# Patient Record
Sex: FEMALE | Race: WHITE | NOT HISPANIC OR LATINO
[De-identification: names, ages, dates, MRNs, and addresses within clinical notes are randomized per-mention and may not be internally consistent; named-entity substitution may affect disease eponyms.]

---

## 2017-02-05 ENCOUNTER — RESULT REVIEW (OUTPATIENT)
Age: 57
End: 2017-02-05

## 2017-02-06 ENCOUNTER — APPOINTMENT (OUTPATIENT)
Dept: MAMMOGRAPHY | Facility: IMAGING CENTER | Age: 57
End: 2017-02-06

## 2017-02-06 ENCOUNTER — OUTPATIENT (OUTPATIENT)
Dept: OUTPATIENT SERVICES | Facility: HOSPITAL | Age: 57
LOS: 1 days | End: 2017-02-06
Payer: COMMERCIAL

## 2017-02-06 DIAGNOSIS — Z98.89 OTHER SPECIFIED POSTPROCEDURAL STATES: Chronic | ICD-10-CM

## 2017-02-06 DIAGNOSIS — Z41.1 ENCOUNTER FOR COSMETIC SURGERY: Chronic | ICD-10-CM

## 2017-02-06 DIAGNOSIS — Z00.8 ENCOUNTER FOR OTHER GENERAL EXAMINATION: ICD-10-CM

## 2017-02-06 DIAGNOSIS — Z00.00 ENCOUNTER FOR GENERAL ADULT MEDICAL EXAMINATION WITHOUT ABNORMAL FINDINGS: ICD-10-CM

## 2017-02-09 ENCOUNTER — APPOINTMENT (OUTPATIENT)
Dept: SURGICAL ONCOLOGY | Facility: CLINIC | Age: 57
End: 2017-02-09

## 2017-02-09 VITALS
DIASTOLIC BLOOD PRESSURE: 81 MMHG | OXYGEN SATURATION: 100 % | TEMPERATURE: 97.7 F | SYSTOLIC BLOOD PRESSURE: 122 MMHG | HEART RATE: 69 BPM | HEIGHT: 65 IN | BODY MASS INDEX: 21.16 KG/M2 | WEIGHT: 127 LBS

## 2017-02-09 DIAGNOSIS — Z87.891 PERSONAL HISTORY OF NICOTINE DEPENDENCE: ICD-10-CM

## 2017-02-09 DIAGNOSIS — R63.4 ABNORMAL WEIGHT LOSS: ICD-10-CM

## 2017-02-13 ENCOUNTER — FORM ENCOUNTER (OUTPATIENT)
Age: 57
End: 2017-02-13

## 2017-02-13 ENCOUNTER — APPOINTMENT (OUTPATIENT)
Dept: OBGYN | Facility: CLINIC | Age: 57
End: 2017-02-13

## 2017-02-14 ENCOUNTER — APPOINTMENT (OUTPATIENT)
Dept: MRI IMAGING | Facility: CLINIC | Age: 57
End: 2017-02-14

## 2017-02-14 ENCOUNTER — OUTPATIENT (OUTPATIENT)
Dept: OUTPATIENT SERVICES | Facility: HOSPITAL | Age: 57
LOS: 1 days | End: 2017-02-14
Payer: COMMERCIAL

## 2017-02-14 DIAGNOSIS — D05.12 INTRADUCTAL CARCINOMA IN SITU OF LEFT BREAST: ICD-10-CM

## 2017-02-14 DIAGNOSIS — Z41.1 ENCOUNTER FOR COSMETIC SURGERY: Chronic | ICD-10-CM

## 2017-02-14 DIAGNOSIS — Z98.89 OTHER SPECIFIED POSTPROCEDURAL STATES: Chronic | ICD-10-CM

## 2017-02-21 ENCOUNTER — FORM ENCOUNTER (OUTPATIENT)
Age: 57
End: 2017-02-21

## 2017-02-22 ENCOUNTER — APPOINTMENT (OUTPATIENT)
Dept: ULTRASOUND IMAGING | Facility: IMAGING CENTER | Age: 57
End: 2017-02-22

## 2017-02-22 ENCOUNTER — OUTPATIENT (OUTPATIENT)
Dept: OUTPATIENT SERVICES | Facility: HOSPITAL | Age: 57
LOS: 1 days | End: 2017-02-22
Payer: COMMERCIAL

## 2017-02-22 DIAGNOSIS — Z00.8 ENCOUNTER FOR OTHER GENERAL EXAMINATION: ICD-10-CM

## 2017-02-22 DIAGNOSIS — Z41.1 ENCOUNTER FOR COSMETIC SURGERY: Chronic | ICD-10-CM

## 2017-02-22 DIAGNOSIS — Z98.89 OTHER SPECIFIED POSTPROCEDURAL STATES: Chronic | ICD-10-CM

## 2017-02-22 PROCEDURE — 76642 ULTRASOUND BREAST LIMITED: CPT

## 2017-03-22 ENCOUNTER — OUTPATIENT (OUTPATIENT)
Dept: OUTPATIENT SERVICES | Facility: HOSPITAL | Age: 57
LOS: 1 days | End: 2017-03-22
Payer: COMMERCIAL

## 2017-03-22 VITALS
TEMPERATURE: 99 F | RESPIRATION RATE: 16 BRPM | HEIGHT: 64.25 IN | WEIGHT: 132.06 LBS | SYSTOLIC BLOOD PRESSURE: 122 MMHG | DIASTOLIC BLOOD PRESSURE: 70 MMHG | HEART RATE: 76 BPM

## 2017-03-22 DIAGNOSIS — D05.12 INTRADUCTAL CARCINOMA IN SITU OF LEFT BREAST: ICD-10-CM

## 2017-03-22 DIAGNOSIS — Z98.89 OTHER SPECIFIED POSTPROCEDURAL STATES: Chronic | ICD-10-CM

## 2017-03-22 DIAGNOSIS — Z41.1 ENCOUNTER FOR COSMETIC SURGERY: Chronic | ICD-10-CM

## 2017-03-22 DIAGNOSIS — E89.2 POSTPROCEDURAL HYPOPARATHYROIDISM: Chronic | ICD-10-CM

## 2017-03-22 DIAGNOSIS — K21.9 GASTRO-ESOPHAGEAL REFLUX DISEASE WITHOUT ESOPHAGITIS: ICD-10-CM

## 2017-03-22 LAB
ALBUMIN SERPL ELPH-MCNC: 4.5 G/DL — SIGNIFICANT CHANGE UP (ref 3.3–5)
ALP SERPL-CCNC: 72 U/L — SIGNIFICANT CHANGE UP (ref 40–120)
ALT FLD-CCNC: 22 U/L — SIGNIFICANT CHANGE UP (ref 4–33)
AST SERPL-CCNC: 23 U/L — SIGNIFICANT CHANGE UP (ref 4–32)
BILIRUB SERPL-MCNC: 0.7 MG/DL — SIGNIFICANT CHANGE UP (ref 0.2–1.2)
BLD GP AB SCN SERPL QL: NEGATIVE — SIGNIFICANT CHANGE UP
BUN SERPL-MCNC: 31 MG/DL — HIGH (ref 7–23)
CALCIUM SERPL-MCNC: 9.6 MG/DL — SIGNIFICANT CHANGE UP (ref 8.4–10.5)
CHLORIDE SERPL-SCNC: 100 MMOL/L — SIGNIFICANT CHANGE UP (ref 98–107)
CO2 SERPL-SCNC: 27 MMOL/L — SIGNIFICANT CHANGE UP (ref 22–31)
CREAT SERPL-MCNC: 1.1 MG/DL — SIGNIFICANT CHANGE UP (ref 0.5–1.3)
GLUCOSE SERPL-MCNC: 64 MG/DL — LOW (ref 70–99)
HCT VFR BLD CALC: 42.7 % — SIGNIFICANT CHANGE UP (ref 34.5–45)
HGB BLD-MCNC: 14.4 G/DL — SIGNIFICANT CHANGE UP (ref 11.5–15.5)
MCHC RBC-ENTMCNC: 30.1 PG — SIGNIFICANT CHANGE UP (ref 27–34)
MCHC RBC-ENTMCNC: 33.7 % — SIGNIFICANT CHANGE UP (ref 32–36)
MCV RBC AUTO: 89.1 FL — SIGNIFICANT CHANGE UP (ref 80–100)
PLATELET # BLD AUTO: 258 K/UL — SIGNIFICANT CHANGE UP (ref 150–400)
PMV BLD: 10.5 FL — SIGNIFICANT CHANGE UP (ref 7–13)
POTASSIUM SERPL-MCNC: 3.4 MMOL/L — LOW (ref 3.5–5.3)
POTASSIUM SERPL-SCNC: 3.4 MMOL/L — LOW (ref 3.5–5.3)
PROT SERPL-MCNC: 7 G/DL — SIGNIFICANT CHANGE UP (ref 6–8.3)
RBC # BLD: 4.79 M/UL — SIGNIFICANT CHANGE UP (ref 3.8–5.2)
RBC # FLD: 12.8 % — SIGNIFICANT CHANGE UP (ref 10.3–14.5)
RH IG SCN BLD-IMP: POSITIVE — SIGNIFICANT CHANGE UP
SODIUM SERPL-SCNC: 142 MMOL/L — SIGNIFICANT CHANGE UP (ref 135–145)
WBC # BLD: 7.45 K/UL — SIGNIFICANT CHANGE UP (ref 3.8–10.5)
WBC # FLD AUTO: 7.45 K/UL — SIGNIFICANT CHANGE UP (ref 3.8–10.5)

## 2017-03-22 PROCEDURE — 93010 ELECTROCARDIOGRAM REPORT: CPT

## 2017-03-22 NOTE — H&P PST ADULT - GASTROINTESTINAL DETAILS
no organomegaly/no masses palpable/soft/nontender/no rebound tenderness/no guarding/bowel sounds normal/no distention

## 2017-03-22 NOTE — H&P PST ADULT - NEGATIVE OPHTHALMOLOGIC SYMPTOMS
no photophobia/no lacrimation L/no blurred vision R/no lacrimation R/no diplopia/no blurred vision L

## 2017-03-22 NOTE — H&P PST ADULT - NEGATIVE NEUROLOGICAL SYMPTOMS
no weakness/no paresthesias/no vertigo/no syncope/no tremors/no transient paralysis/no headache/no generalized seizures/no focal seizures/no difficulty walking

## 2017-03-22 NOTE — H&P PST ADULT - NEGATIVE MUSCULOSKELETAL SYMPTOMS
no arthralgia/no joint swelling/no stiffness/no myalgia/no muscle cramps/no arthritis/no muscle weakness

## 2017-03-22 NOTE — H&P PST ADULT - NEGATIVE ENMT SYMPTOMS
no sinus symptoms/no ear pain/no vertigo/no nose bleeds/no hearing difficulty/no post-nasal discharge/no nasal discharge/no nasal congestion/no tinnitus

## 2017-03-22 NOTE — H&P PST ADULT - RS GEN PE MLT RESP DETAILS PC
airway patent/no rhonchi/good air movement/no rales/breath sounds equal/respirations non-labored/clear to auscultation bilaterally

## 2017-03-22 NOTE — H&P PST ADULT - NEGATIVE CARDIOVASCULAR SYMPTOMS
no peripheral edema/no claudication/no palpitations/no orthopnea/no paroxysmal nocturnal dyspnea/no dyspnea on exertion/no chest pain

## 2017-03-22 NOTE — H&P PST ADULT - HISTORY OF PRESENT ILLNESS
55 y/o healthy female presents for preop evaluation for Bilateral Total Mastectomies, bilateral Axillary [place] Lymph Node Biopsies Possible Bilateral Axillary Node Dissection, Bilateral Tissue Expanders 55 y/o healthy female presents for preop evaluation for Bilateral Total Mastectomies, bilateral Axillary [place] Lymph Node Biopsies Possible Bilateral Axillary Node Dissection, Bilateral Tissue Expanders, Bilateral Axillary Closure, B/L Alloderm, Bilateral Muscle Flaps on 03/30/17. Pt states he went for routine mammogram & left breast mass seen. Pt had sonogram & MRI of breasts with biopsy & surgery recommended..

## 2017-03-22 NOTE — H&P PST ADULT - NEGATIVE GASTROINTESTINAL SYMPTOMS
no change in bowel habits/no melena/no constipation/no hematochezia/no vomiting/no abdominal pain/no diarrhea/no nausea

## 2017-03-22 NOTE — H&P PST ADULT - NEGATIVE FEMALE-SPECIFIC SYMPTOMS
no vaginal discharge/no dysmenorrhea/no irregular menses/no menorrhagia/no spotting/no pelvic pain/no amenorrhea/no abnormal vaginal bleeding

## 2017-03-22 NOTE — H&P PST ADULT - FAMILY HISTORY
Mother  Still living? No  Family history of breast cancer in mother, Age at diagnosis: Age Unknown     Father  Still living? No  Family history of lung cancer, Age at diagnosis: Age Unknown     Sibling  Still living? Yes, Estimated age: Age Unknown  Family history of malignant melanoma of skin, Age at diagnosis: Age Unknown

## 2017-03-22 NOTE — H&P PST ADULT - NSANTHOSAYNRD_GEN_A_CORE
No. EDMUND screening performed.  STOP BANG Legend: 0-2 = LOW Risk; 3-4 = INTERMEDIATE Risk; 5-8 = HIGH Risk

## 2017-03-22 NOTE — H&P PST ADULT - PROBLEM SELECTOR PLAN 1
Scheduled for  Bilateral Total Mastectomies, bilateral Axillary [place] Lymph Node Biopsies Possible Bilateral Axillary Node Dissection, Bilateral Tissue Expanders, Bilateral Axillary Closure, B/L Alloderm, Bilateral Muscle Flaps on 03/30/17

## 2017-03-22 NOTE — H&P PST ADULT - PSH
H/O breast biopsy  pt. can't recall which breast-2005- negative  Biopsy   H/O rhinoplasty  1976  History of parathyroidectomy    Liveborn by   c x 1 - Triplets H/O breast biopsy  pt. can't recall which breast-2005- negative  Biopsy   H/O rhinoplasty  1976  History of parathyroidectomy  10/2014  Liveborn by   c x 1 - Triplets

## 2017-03-29 ENCOUNTER — RESULT REVIEW (OUTPATIENT)
Age: 57
End: 2017-03-29

## 2017-03-29 NOTE — ASU PATIENT PROFILE, ADULT - PSH
H/O breast biopsy  pt. can't recall which breast-2005- negative  Biopsy   H/O rhinoplasty  1976  History of parathyroidectomy  10/2014  Liveborn by   c x 1 - Triplets

## 2017-03-30 ENCOUNTER — INPATIENT (INPATIENT)
Facility: HOSPITAL | Age: 57
LOS: 1 days | Discharge: HOME | End: 2017-04-01
Attending: SURGERY | Admitting: SURGERY
Payer: COMMERCIAL

## 2017-03-30 ENCOUNTER — APPOINTMENT (OUTPATIENT)
Dept: NUCLEAR MEDICINE | Facility: HOSPITAL | Age: 57
End: 2017-03-30

## 2017-03-30 ENCOUNTER — APPOINTMENT (OUTPATIENT)
Dept: SURGICAL ONCOLOGY | Facility: HOSPITAL | Age: 57
End: 2017-03-30

## 2017-03-30 VITALS
TEMPERATURE: 98 F | DIASTOLIC BLOOD PRESSURE: 69 MMHG | OXYGEN SATURATION: 100 % | WEIGHT: 132.06 LBS | SYSTOLIC BLOOD PRESSURE: 107 MMHG | RESPIRATION RATE: 16 BRPM | HEIGHT: 64.25 IN | HEART RATE: 80 BPM

## 2017-03-30 DIAGNOSIS — Z98.89 OTHER SPECIFIED POSTPROCEDURAL STATES: Chronic | ICD-10-CM

## 2017-03-30 DIAGNOSIS — Z41.1 ENCOUNTER FOR COSMETIC SURGERY: Chronic | ICD-10-CM

## 2017-03-30 DIAGNOSIS — E89.2 POSTPROCEDURAL HYPOPARATHYROIDISM: Chronic | ICD-10-CM

## 2017-03-30 DIAGNOSIS — D05.12 INTRADUCTAL CARCINOMA IN SITU OF LEFT BREAST: ICD-10-CM

## 2017-03-30 LAB — RH IG SCN BLD-IMP: POSITIVE — SIGNIFICANT CHANGE UP

## 2017-03-30 PROCEDURE — 38792 RA TRACER ID OF SENTINL NODE: CPT | Mod: 50

## 2017-03-30 PROCEDURE — 38740 REMOVE ARMPIT LYMPH NODES: CPT | Mod: 50,59

## 2017-03-30 PROCEDURE — 88307 TISSUE EXAM BY PATHOLOGIST: CPT | Mod: 26

## 2017-03-30 PROCEDURE — 12034 INTMD RPR S/TR/EXT 7.6-12.5: CPT | Mod: 59

## 2017-03-30 PROCEDURE — 19303 MAST SIMPLE COMPLETE: CPT | Mod: 50

## 2017-03-30 PROCEDURE — 88305 TISSUE EXAM BY PATHOLOGIST: CPT | Mod: 26

## 2017-03-30 PROCEDURE — 15734 MUSCLE-SKIN GRAFT TRUNK: CPT | Mod: 59

## 2017-03-30 PROCEDURE — 88331 PATH CONSLTJ SURG 1 BLK 1SPC: CPT | Mod: 26

## 2017-03-30 PROCEDURE — 19357 TISS XPNDR PLMT BRST RCNSTJ: CPT | Mod: 50

## 2017-03-30 RX ORDER — ACETAMINOPHEN 500 MG
650 TABLET ORAL EVERY 6 HOURS
Qty: 0 | Refills: 0 | Status: DISCONTINUED | OUTPATIENT
Start: 2017-03-30 | End: 2017-04-01

## 2017-03-30 RX ORDER — CEFAZOLIN SODIUM 1 G
1000 VIAL (EA) INJECTION EVERY 8 HOURS
Qty: 0 | Refills: 0 | Status: DISCONTINUED | OUTPATIENT
Start: 2017-03-30 | End: 2017-04-01

## 2017-03-30 RX ORDER — METOCLOPRAMIDE HCL 10 MG
10 TABLET ORAL EVERY 8 HOURS
Qty: 0 | Refills: 0 | Status: DISCONTINUED | OUTPATIENT
Start: 2017-03-30 | End: 2017-04-01

## 2017-03-30 RX ORDER — HYDROMORPHONE HYDROCHLORIDE 2 MG/ML
0.5 INJECTION INTRAMUSCULAR; INTRAVENOUS; SUBCUTANEOUS
Qty: 0 | Refills: 0 | Status: DISCONTINUED | OUTPATIENT
Start: 2017-03-30 | End: 2017-03-30

## 2017-03-30 RX ORDER — SODIUM CHLORIDE 9 MG/ML
1000 INJECTION, SOLUTION INTRAVENOUS
Qty: 0 | Refills: 0 | Status: DISCONTINUED | OUTPATIENT
Start: 2017-03-30 | End: 2017-03-31

## 2017-03-30 RX ORDER — DIAZEPAM 5 MG
5 TABLET ORAL EVERY 6 HOURS
Qty: 0 | Refills: 0 | Status: DISCONTINUED | OUTPATIENT
Start: 2017-03-30 | End: 2017-04-01

## 2017-03-30 RX ORDER — SODIUM CHLORIDE 9 MG/ML
1000 INJECTION, SOLUTION INTRAVENOUS
Qty: 0 | Refills: 0 | Status: DISCONTINUED | OUTPATIENT
Start: 2017-03-30 | End: 2017-03-30

## 2017-03-30 RX ORDER — ONDANSETRON 8 MG/1
4 TABLET, FILM COATED ORAL EVERY 6 HOURS
Qty: 0 | Refills: 0 | Status: DISCONTINUED | OUTPATIENT
Start: 2017-03-30 | End: 2017-04-01

## 2017-03-30 RX ADMIN — SODIUM CHLORIDE 75 MILLILITER(S): 9 INJECTION, SOLUTION INTRAVENOUS at 22:58

## 2017-03-30 RX ADMIN — ONDANSETRON 4 MILLIGRAM(S): 8 TABLET, FILM COATED ORAL at 23:26

## 2017-03-30 NOTE — BRIEF OPERATIVE NOTE - SPECIMENS
right breast, right retroareolar tissue, right sentinel lymph node, left breast, left retroareolar tissue, left sentinel lymph node right breast, right subareolar ducts, right sentinel lymph node, left breast, left subareolar ducts, left sentinel lymph node

## 2017-03-30 NOTE — BRIEF OPERATIVE NOTE - OPERATION/FINDINGS
bilateral nipple sparing mastectomies, bilateral sentinel lymph node biopsies bilateral nipple sparing mastectomies, bilateral sentinel lymph node biopsies (frozen section negative)

## 2017-03-30 NOTE — BRIEF OPERATIVE NOTE - OPERATION/FINDINGS
Brief operative note for plastic surgery portion; please see separate surgical oncology note.     Bilateral immediate breast reconstruction with insertion of 350cc tissue expanders (250cc fill bilaterally) in the subpectoral plane with ADM slings was performed following bilateral nipple and areola sparing mastectomies and bilateral sentinel lymph node biopsies

## 2017-03-31 ENCOUNTER — TRANSCRIPTION ENCOUNTER (OUTPATIENT)
Age: 57
End: 2017-03-31

## 2017-03-31 LAB
BUN SERPL-MCNC: 18 MG/DL — SIGNIFICANT CHANGE UP (ref 7–23)
CALCIUM SERPL-MCNC: 8.9 MG/DL — SIGNIFICANT CHANGE UP (ref 8.4–10.5)
CHLORIDE SERPL-SCNC: 102 MMOL/L — SIGNIFICANT CHANGE UP (ref 98–107)
CO2 SERPL-SCNC: 27 MMOL/L — SIGNIFICANT CHANGE UP (ref 22–31)
CREAT SERPL-MCNC: 0.84 MG/DL — SIGNIFICANT CHANGE UP (ref 0.5–1.3)
GLUCOSE SERPL-MCNC: 116 MG/DL — HIGH (ref 70–99)
HCT VFR BLD CALC: 38.8 % — SIGNIFICANT CHANGE UP (ref 34.5–45)
HGB BLD-MCNC: 13.2 G/DL — SIGNIFICANT CHANGE UP (ref 11.5–15.5)
MAGNESIUM SERPL-MCNC: 2.1 MG/DL — SIGNIFICANT CHANGE UP (ref 1.6–2.6)
MCHC RBC-ENTMCNC: 30.5 PG — SIGNIFICANT CHANGE UP (ref 27–34)
MCHC RBC-ENTMCNC: 34 % — SIGNIFICANT CHANGE UP (ref 32–36)
MCV RBC AUTO: 89.6 FL — SIGNIFICANT CHANGE UP (ref 80–100)
PHOSPHATE SERPL-MCNC: 3.4 MG/DL — SIGNIFICANT CHANGE UP (ref 2.5–4.5)
PLATELET # BLD AUTO: 209 K/UL — SIGNIFICANT CHANGE UP (ref 150–400)
PMV BLD: 10.4 FL — SIGNIFICANT CHANGE UP (ref 7–13)
POTASSIUM SERPL-MCNC: 3.9 MMOL/L — SIGNIFICANT CHANGE UP (ref 3.5–5.3)
POTASSIUM SERPL-SCNC: 3.9 MMOL/L — SIGNIFICANT CHANGE UP (ref 3.5–5.3)
RBC # BLD: 4.33 M/UL — SIGNIFICANT CHANGE UP (ref 3.8–5.2)
RBC # FLD: 12.9 % — SIGNIFICANT CHANGE UP (ref 10.3–14.5)
SODIUM SERPL-SCNC: 143 MMOL/L — SIGNIFICANT CHANGE UP (ref 135–145)
WBC # BLD: 12.55 K/UL — HIGH (ref 3.8–10.5)
WBC # FLD AUTO: 12.55 K/UL — HIGH (ref 3.8–10.5)

## 2017-03-31 RX ORDER — ACETAMINOPHEN 500 MG
2 TABLET ORAL
Qty: 0 | Refills: 0 | COMMUNITY
Start: 2017-03-31

## 2017-03-31 RX ORDER — ACETAMINOPHEN 500 MG
1000 TABLET ORAL ONCE
Qty: 0 | Refills: 0 | Status: COMPLETED | OUTPATIENT
Start: 2017-03-31 | End: 2017-04-01

## 2017-03-31 RX ORDER — ACETAMINOPHEN 500 MG
1000 TABLET ORAL ONCE
Qty: 0 | Refills: 0 | Status: COMPLETED | OUTPATIENT
Start: 2017-03-31 | End: 2017-03-31

## 2017-03-31 RX ORDER — OXYCODONE HYDROCHLORIDE 5 MG/1
5 TABLET ORAL EVERY 6 HOURS
Qty: 0 | Refills: 0 | Status: DISCONTINUED | OUTPATIENT
Start: 2017-03-31 | End: 2017-04-01

## 2017-03-31 RX ORDER — PANTOPRAZOLE SODIUM 20 MG/1
40 TABLET, DELAYED RELEASE ORAL
Qty: 0 | Refills: 0 | Status: DISCONTINUED | OUTPATIENT
Start: 2017-03-31 | End: 2017-04-01

## 2017-03-31 RX ADMIN — Medication 100 MILLIGRAM(S): at 21:58

## 2017-03-31 RX ADMIN — Medication 100 MILLIGRAM(S): at 03:06

## 2017-03-31 RX ADMIN — PANTOPRAZOLE SODIUM 40 MILLIGRAM(S): 20 TABLET, DELAYED RELEASE ORAL at 11:16

## 2017-03-31 RX ADMIN — Medication 5 MILLIGRAM(S): at 11:16

## 2017-03-31 RX ADMIN — Medication 400 MILLIGRAM(S): at 18:01

## 2017-03-31 RX ADMIN — Medication 10 MILLIGRAM(S): at 01:47

## 2017-03-31 RX ADMIN — Medication 5 MILLIGRAM(S): at 02:40

## 2017-03-31 RX ADMIN — Medication 1000 MILLIGRAM(S): at 18:20

## 2017-03-31 RX ADMIN — Medication 100 MILLIGRAM(S): at 11:17

## 2017-03-31 NOTE — DISCHARGE NOTE ADULT - CARE PROVIDERS DIRECT ADDRESSES
[email or web address]

## 2017-03-31 NOTE — DISCHARGE NOTE ADULT - MEDICATION SUMMARY - MEDICATIONS TO STOP TAKING
I will STOP taking the medications listed below when I get home from the hospital:    Aleve 220 mg oral tablet  -- 2 tab(s) by mouth every 8 hours    Aleve PM 25 mg-220 mg oral tablet  -- 0.25 tab(s) by mouth 1x/week at bedtime

## 2017-03-31 NOTE — DISCHARGE NOTE ADULT - CONDITIONS AT DISCHARGE
Pt vital signs stable, tolerated diet well, voids without difficulty, IV d/c before discharge. BEN teaching given, pt verbalized understanding, return demonstration given, supplies provided.

## 2017-03-31 NOTE — DISCHARGE NOTE ADULT - HOSPITAL COURSE
57 y/o healthy female presents for preop evaluation for Bilateral Total Mastectomies, bilateral Axillary [place] Lymph Node Biopsies Possible Bilateral Axillary Node Dissection, Bilateral Tissue Expanders, Bilateral Axillary Closure, B/L Alloderm, Bilateral Muscle Flaps on 03/30/17. Pt states he went for routine mammogram & left breast mass seen. Pt had sonogram & MRI of breasts with biopsy & surgery recommended.. She presented to [facility] on 3/30/17 for a scheduled procedure. She went to the OR and had a bilateral nipple sparing mastectomies, bilateral sentinel lymph node biopsies (frozen section negative) with Dr. Talley and bilateral immediate breast reconstruction with insertion of 350cc tissue expanders (250cc fill bilaterally) with Dr. Cline. Post op she went to the surgical floor. She remained in the hospital POD 1 due to increased pain. During hospital course patients diet was slowly advanced as tolerated.  At this time, pt is tolerating a regular diet, ambulating and voiding.  Pt is stable for discharge as per the attending at this time. Patient is a 55 y/o healthy woman with DCIS of the left breast who presented to [facility] for resection.  On 3/30/17, she underwent bilateral nipple sparing mastectomies with bilateral sentinel lymph node biopsies (intraoperative frozen sections negative) with Dr. Talley and bilateral immediate breast reconstruction with insertion of 350cc tissue expanders (250cc fill bilaterally) with Dr. Cline. She tolerated the procedure well and was transferred to the surgical floor in stable condition. Post-operative course was complicated by pain exacerbated by intolerance to opioid pain medications, and she remained in the hospital POD 1 for additional management.  On POD 2, her pain was better controlled and she was determined to be stable for discharge to home with VNS. At the time of discharge, she was tolerating a regular diet, able to ambulate independently, and her pain was adequately controlled with oral pain medications.  She had no further issues at the time of discharge and was in agreement with the discharge plan.

## 2017-03-31 NOTE — DISCHARGE NOTE ADULT - CARE PROVIDER_API CALL
Nilay Talley), Surgery  [address] [place] [address]  Phone: [phone]  Fax: [phone]    Dom Cline), Plastic Surgery  [address] [place] [address]  Phone: [phone]  Fax: [phone]

## 2017-03-31 NOTE — DISCHARGE NOTE ADULT - MEDICATION SUMMARY - MEDICATIONS TO TAKE
I will START or STAY ON the medications listed below when I get home from the hospital:    oxyCODONE 5 mg oral tablet  -- 1 tab(s) by mouth every 6 hours, As needed, for Severe Pain MDD:4 tabs  -- Indication: For Severe Pain    acetaminophen 325 mg oral tablet  -- 2 tab(s) by mouth every 6 hours for pain  -- Indication: For Pain    diazePAM 5 mg oral tablet  -- 1 tab(s) by mouth every 6 hours, As needed, muscle spasm MDD:4 tabs  -- Indication: For Muscle Spasm    Dexilant 30 mg oral delayed release capsule  -- 1 cap(s) by mouth 2x/week  -- Indication: For GERD

## 2017-03-31 NOTE — DISCHARGE NOTE ADULT - HOME CARE AGENCY
[facility] [phone]. Initial visit will be day after discharge home. A nurse will call prior to home visit

## 2017-03-31 NOTE — DISCHARGE NOTE ADULT - NSTOBACCOWEBSITE_GEN_A_NCS
[place] Website --- [email or web address] website --- [email or web address] [place] [email or web address] Website --- [email or web address]

## 2017-03-31 NOTE — DISCHARGE NOTE ADULT - NSTOBACCOHOTLINE_GEN_A_NCS
[facility] Smokers Quitline ([phone]) [facility] Smokers Quitline ([phone])

## 2017-03-31 NOTE — DISCHARGE NOTE ADULT - CARE PLAN
Principal Discharge DX:	Intraductal carcinoma in situ of left breast  Goal:	s/p bilateral mastectomy b/l axillary SLN, b/l tissue expanders  Instructions for follow-up, activity and diet:	WOUND CARE:  Please keep incisions clean and dry. Please do not Scrub or rub incisions. Do not use lotion or powder on incisions. You will be discharged with BEN drains. You will need to empty them and record outputs accurately. This will be taught to you by the nursing staff. Please do not remove the BEN drains. They will be removed in the office. Please bring to the office accurate records of output.   BATHING: Please do not submerge wound underwater. You may shower and/or sponge bathe.  ACTIVITY: No heavy lifting or straining. Otherwise, you may return to your usual level of physical activity. If you are taking narcotic pain medication (such as Percocet) DO NOT drive a car, operate machinery or make important decisions.  DIET: Return to your usual diet.  NOTIFY YOUR SURGEON IF: You have any bleeding that does not stop, any pus draining from your wound(s), any fever (over 100.4 F) or chills, persistent nausea/vomiting, persistent diarrhea, or if your pain is not controlled on your discharge pain medications.  FOLLOW-UP: Please follow up with your primary care physician in one week regarding your hospitalization.  Please follow up with your surgeon, Dr. Talley and Dr. Cline Principal Discharge DX:	Intraductal carcinoma in situ of left breast  Goal:	s/p bilateral mastectomy b/l axillary SLN, b/l tissue expanders  Instructions for follow-up, activity and diet:	WOUND CARE:  Please keep incisions clean and dry. Please do not Scrub or rub incisions. Do not use lotion or powder on incisions. You will be discharged with BEN drains. You will need to empty them and record outputs accurately. This will be taught to you by the nursing staff. Please do not remove the BEN drains. They will be removed in the office. Please bring to the office accurate records of output to help your surgeon to know the appropriate time to remove the drains. A visiting nurse has been set up to come to your home to help assess understanding of drain procedures and to aid you and your family in drain management.   BATHING: Please do not submerge wound underwater. You may shower and/or sponge bathe, allowing warm soap and water to wash over the wounds, but do not scrub.  Pat dry when done.  Use a belt or strap to attach the drains to your waist to prevent them from dangling during bathing.  ACTIVITY: No heavy lifting or straining.  Avoid lifting your arms over your head and any vigorous pushing or pulling.  Otherwise, you may return to your usual level of physical activity. If you are taking narcotic pain medication (such as Oxycodone) DO NOT drive a car, operate machinery or make important decisions.  DIET: Return to your usual diet.  NOTIFY YOUR SURGEON IF: You have any bleeding that does not stop, any pus draining from your wound(s), any fever (over 100.4 F) or chills, persistent nausea/vomiting, persistent diarrhea, or if your pain is not controlled on your discharge pain medications.  FOLLOW-UP:   1. Please follow up with your primary care physician in one week regarding your hospitalization.  2. Please follow up with your surgeons, Dr. Talley and Dr. Cline in 1 week following discharge.

## 2017-03-31 NOTE — DISCHARGE NOTE ADULT - PLAN OF CARE
s/p bilateral mastectomy b/l axillary SLN, b/l tissue expanders WOUND CARE:  Please keep incisions clean and dry. Please do not Scrub or rub incisions. Do not use lotion or powder on incisions. You will be discharged with BEN drains. You will need to empty them and record outputs accurately. This will be taught to you by the nursing staff. Please do not remove the BEN drains. They will be removed in the office. Please bring to the office accurate records of output.   BATHING: Please do not submerge wound underwater. You may shower and/or sponge bathe.  ACTIVITY: No heavy lifting or straining. Otherwise, you may return to your usual level of physical activity. If you are taking narcotic pain medication (such as Percocet) DO NOT drive a car, operate machinery or make important decisions.  DIET: Return to your usual diet.  NOTIFY YOUR SURGEON IF: You have any bleeding that does not stop, any pus draining from your wound(s), any fever (over 100.4 F) or chills, persistent nausea/vomiting, persistent diarrhea, or if your pain is not controlled on your discharge pain medications.  FOLLOW-UP: Please follow up with your primary care physician in one week regarding your hospitalization.  Please follow up with your surgeon, Dr. Talley and Dr. Cline WOUND CARE:  Please keep incisions clean and dry. Please do not Scrub or rub incisions. Do not use lotion or powder on incisions. You will be discharged with BEN drains. You will need to empty them and record outputs accurately. This will be taught to you by the nursing staff. Please do not remove the BEN drains. They will be removed in the office. Please bring to the office accurate records of output to help your surgeon to know the appropriate time to remove the drains. A visiting nurse has been set up to come to your home to help assess understanding of drain procedures and to aid you and your family in drain management.   BATHING: Please do not submerge wound underwater. You may shower and/or sponge bathe, allowing warm soap and water to wash over the wounds, but do not scrub.  Pat dry when done.  Use a belt or strap to attach the drains to your waist to prevent them from dangling during bathing.  ACTIVITY: No heavy lifting or straining.  Avoid lifting your arms over your head and any vigorous pushing or pulling.  Otherwise, you may return to your usual level of physical activity. If you are taking narcotic pain medication (such as Oxycodone) DO NOT drive a car, operate machinery or make important decisions.  DIET: Return to your usual diet.  NOTIFY YOUR SURGEON IF: You have any bleeding that does not stop, any pus draining from your wound(s), any fever (over 100.4 F) or chills, persistent nausea/vomiting, persistent diarrhea, or if your pain is not controlled on your discharge pain medications.  FOLLOW-UP:   1. Please follow up with your primary care physician in one week regarding your hospitalization.  2. Please follow up with your surgeons, Dr. Talley and Dr. Cline in 1 week following discharge.

## 2017-04-01 VITALS
RESPIRATION RATE: 18 BRPM | TEMPERATURE: 98 F | SYSTOLIC BLOOD PRESSURE: 113 MMHG | OXYGEN SATURATION: 100 % | DIASTOLIC BLOOD PRESSURE: 58 MMHG | HEART RATE: 95 BPM

## 2017-04-01 RX ORDER — ACETAMINOPHEN 500 MG
975 TABLET ORAL ONCE
Qty: 0 | Refills: 0 | Status: COMPLETED | OUTPATIENT
Start: 2017-04-01 | End: 2017-04-01

## 2017-04-01 RX ORDER — DIAZEPAM 5 MG
1 TABLET ORAL
Qty: 28 | Refills: 0 | OUTPATIENT
Start: 2017-04-01 | End: 2017-04-08

## 2017-04-01 RX ORDER — OXYCODONE HYDROCHLORIDE 5 MG/1
1 TABLET ORAL
Qty: 20 | Refills: 0 | OUTPATIENT
Start: 2017-04-01 | End: 2017-04-06

## 2017-04-01 RX ORDER — ACETAMINOPHEN 500 MG
2 TABLET ORAL
Qty: 40 | Refills: 0 | OUTPATIENT
Start: 2017-04-01 | End: 2017-04-06

## 2017-04-01 RX ADMIN — Medication 400 MILLIGRAM(S): at 01:36

## 2017-04-01 RX ADMIN — Medication 975 MILLIGRAM(S): at 09:30

## 2017-04-01 RX ADMIN — Medication 100 MILLIGRAM(S): at 05:40

## 2017-04-01 RX ADMIN — Medication 975 MILLIGRAM(S): at 17:14

## 2017-04-01 RX ADMIN — Medication 1000 MILLIGRAM(S): at 02:06

## 2017-04-01 RX ADMIN — Medication 5 MILLIGRAM(S): at 01:36

## 2017-04-01 RX ADMIN — Medication 975 MILLIGRAM(S): at 08:59

## 2017-04-01 RX ADMIN — Medication 100 MILLIGRAM(S): at 13:34

## 2017-04-01 RX ADMIN — PANTOPRAZOLE SODIUM 40 MILLIGRAM(S): 20 TABLET, DELAYED RELEASE ORAL at 06:28

## 2017-04-05 LAB — SURGICAL PATHOLOGY STUDY: SIGNIFICANT CHANGE UP

## 2017-04-10 ENCOUNTER — APPOINTMENT (OUTPATIENT)
Dept: SURGICAL ONCOLOGY | Facility: CLINIC | Age: 57
End: 2017-04-10

## 2017-04-10 VITALS
HEIGHT: 65 IN | HEART RATE: 66 BPM | WEIGHT: 127 LBS | DIASTOLIC BLOOD PRESSURE: 84 MMHG | BODY MASS INDEX: 21.16 KG/M2 | SYSTOLIC BLOOD PRESSURE: 125 MMHG

## 2017-04-13 PROCEDURE — C8908: CPT

## 2017-04-13 PROCEDURE — 88360 TUMOR IMMUNOHISTOCHEM/MANUAL: CPT

## 2017-04-13 PROCEDURE — A9585: CPT

## 2017-04-13 PROCEDURE — C8937: CPT

## 2017-04-13 PROCEDURE — 88305 TISSUE EXAM BY PATHOLOGIST: CPT

## 2017-04-13 PROCEDURE — 19081 BX BREAST 1ST LESION STRTCTC: CPT

## 2017-04-13 PROCEDURE — 77065 DX MAMMO INCL CAD UNI: CPT

## 2017-04-13 PROCEDURE — A4648: CPT

## 2017-09-02 ENCOUNTER — INPATIENT (INPATIENT)
Facility: HOSPITAL | Age: 57
LOS: 3 days | Discharge: HOME | End: 2017-09-06
Attending: SURGERY | Admitting: SURGERY
Payer: COMMERCIAL

## 2017-09-02 VITALS
TEMPERATURE: 99 F | SYSTOLIC BLOOD PRESSURE: 144 MMHG | HEART RATE: 97 BPM | OXYGEN SATURATION: 97 % | RESPIRATION RATE: 17 BRPM | DIASTOLIC BLOOD PRESSURE: 93 MMHG

## 2017-09-02 DIAGNOSIS — Z98.82 BREAST IMPLANT STATUS: Chronic | ICD-10-CM

## 2017-09-02 DIAGNOSIS — Z98.89 OTHER SPECIFIED POSTPROCEDURAL STATES: Chronic | ICD-10-CM

## 2017-09-02 DIAGNOSIS — E89.2 POSTPROCEDURAL HYPOPARATHYROIDISM: Chronic | ICD-10-CM

## 2017-09-02 DIAGNOSIS — Z90.13 ACQUIRED ABSENCE OF BILATERAL BREASTS AND NIPPLES: Chronic | ICD-10-CM

## 2017-09-02 DIAGNOSIS — Z41.1 ENCOUNTER FOR COSMETIC SURGERY: Chronic | ICD-10-CM

## 2017-09-02 DIAGNOSIS — N61.1 ABSCESS OF THE BREAST AND NIPPLE: ICD-10-CM

## 2017-09-02 LAB
ALBUMIN SERPL ELPH-MCNC: 4 G/DL — SIGNIFICANT CHANGE UP (ref 3.3–5)
ALP SERPL-CCNC: 73 U/L — SIGNIFICANT CHANGE UP (ref 40–120)
ALT FLD-CCNC: 22 U/L — SIGNIFICANT CHANGE UP (ref 4–33)
APTT BLD: 28.4 SEC — SIGNIFICANT CHANGE UP (ref 27.5–37.4)
AST SERPL-CCNC: 20 U/L — SIGNIFICANT CHANGE UP (ref 4–32)
BASOPHILS # BLD AUTO: 0.02 K/UL — SIGNIFICANT CHANGE UP (ref 0–0.2)
BASOPHILS NFR BLD AUTO: 0.1 % — SIGNIFICANT CHANGE UP (ref 0–2)
BILIRUB SERPL-MCNC: 1.5 MG/DL — HIGH (ref 0.2–1.2)
BLD GP AB SCN SERPL QL: NEGATIVE — SIGNIFICANT CHANGE UP
BUN SERPL-MCNC: 22 MG/DL — SIGNIFICANT CHANGE UP (ref 7–23)
CALCIUM SERPL-MCNC: 9.2 MG/DL — SIGNIFICANT CHANGE UP (ref 8.4–10.5)
CHLORIDE SERPL-SCNC: 97 MMOL/L — LOW (ref 98–107)
CO2 SERPL-SCNC: 26 MMOL/L — SIGNIFICANT CHANGE UP (ref 22–31)
CREAT SERPL-MCNC: 0.86 MG/DL — SIGNIFICANT CHANGE UP (ref 0.5–1.3)
EOSINOPHIL # BLD AUTO: 0.13 K/UL — SIGNIFICANT CHANGE UP (ref 0–0.5)
EOSINOPHIL NFR BLD AUTO: 0.7 % — SIGNIFICANT CHANGE UP (ref 0–6)
GLUCOSE SERPL-MCNC: 99 MG/DL — SIGNIFICANT CHANGE UP (ref 70–99)
HCT VFR BLD CALC: 40.8 % — SIGNIFICANT CHANGE UP (ref 34.5–45)
HGB BLD-MCNC: 13.8 G/DL — SIGNIFICANT CHANGE UP (ref 11.5–15.5)
IMM GRANULOCYTES # BLD AUTO: 0.09 # — SIGNIFICANT CHANGE UP
IMM GRANULOCYTES NFR BLD AUTO: 0.5 % — SIGNIFICANT CHANGE UP (ref 0–1.5)
INR BLD: 1.38 — HIGH (ref 0.88–1.17)
LYMPHOCYTES # BLD AUTO: 1.38 K/UL — SIGNIFICANT CHANGE UP (ref 1–3.3)
LYMPHOCYTES # BLD AUTO: 8 % — LOW (ref 13–44)
MAGNESIUM SERPL-MCNC: 2.1 MG/DL — SIGNIFICANT CHANGE UP (ref 1.6–2.6)
MCHC RBC-ENTMCNC: 29.4 PG — SIGNIFICANT CHANGE UP (ref 27–34)
MCHC RBC-ENTMCNC: 33.8 % — SIGNIFICANT CHANGE UP (ref 32–36)
MCV RBC AUTO: 87 FL — SIGNIFICANT CHANGE UP (ref 80–100)
MONOCYTES # BLD AUTO: 1.08 K/UL — HIGH (ref 0–0.9)
MONOCYTES NFR BLD AUTO: 6.2 % — SIGNIFICANT CHANGE UP (ref 2–14)
NEUTROPHILS # BLD AUTO: 14.64 K/UL — HIGH (ref 1.8–7.4)
NEUTROPHILS NFR BLD AUTO: 84.5 % — HIGH (ref 43–77)
NRBC # FLD: 0 — SIGNIFICANT CHANGE UP
PHOSPHATE SERPL-MCNC: 2.4 MG/DL — LOW (ref 2.5–4.5)
PLATELET # BLD AUTO: 206 K/UL — SIGNIFICANT CHANGE UP (ref 150–400)
PMV BLD: 10.3 FL — SIGNIFICANT CHANGE UP (ref 7–13)
POTASSIUM SERPL-MCNC: 3.3 MMOL/L — LOW (ref 3.5–5.3)
POTASSIUM SERPL-SCNC: 3.3 MMOL/L — LOW (ref 3.5–5.3)
PROT SERPL-MCNC: 7.3 G/DL — SIGNIFICANT CHANGE UP (ref 6–8.3)
PROTHROM AB SERPL-ACNC: 15.6 SEC — HIGH (ref 9.8–13.1)
RBC # BLD: 4.69 M/UL — SIGNIFICANT CHANGE UP (ref 3.8–5.2)
RBC # FLD: 12.9 % — SIGNIFICANT CHANGE UP (ref 10.3–14.5)
RH IG SCN BLD-IMP: POSITIVE — SIGNIFICANT CHANGE UP
SODIUM SERPL-SCNC: 136 MMOL/L — SIGNIFICANT CHANGE UP (ref 135–145)
WBC # BLD: 17.34 K/UL — HIGH (ref 3.8–10.5)
WBC # FLD AUTO: 17.34 K/UL — HIGH (ref 3.8–10.5)

## 2017-09-02 PROCEDURE — 76642 ULTRASOUND BREAST LIMITED: CPT | Mod: 26,LT

## 2017-09-02 RX ORDER — ACETAMINOPHEN 500 MG
650 TABLET ORAL EVERY 6 HOURS
Qty: 0 | Refills: 0 | Status: DISCONTINUED | OUTPATIENT
Start: 2017-09-02 | End: 2017-09-06

## 2017-09-02 RX ORDER — ACETAMINOPHEN 500 MG
650 TABLET ORAL ONCE
Qty: 0 | Refills: 0 | Status: DISCONTINUED | OUTPATIENT
Start: 2017-09-02 | End: 2017-09-02

## 2017-09-02 RX ORDER — SODIUM CHLORIDE 9 MG/ML
1000 INJECTION, SOLUTION INTRAVENOUS
Qty: 0 | Refills: 0 | Status: DISCONTINUED | OUTPATIENT
Start: 2017-09-02 | End: 2017-09-03

## 2017-09-02 RX ORDER — SODIUM CHLORIDE 9 MG/ML
1000 INJECTION INTRAMUSCULAR; INTRAVENOUS; SUBCUTANEOUS ONCE
Qty: 0 | Refills: 0 | Status: COMPLETED | OUTPATIENT
Start: 2017-09-02 | End: 2017-09-02

## 2017-09-02 RX ORDER — PANTOPRAZOLE SODIUM 20 MG/1
40 TABLET, DELAYED RELEASE ORAL DAILY
Qty: 0 | Refills: 0 | Status: DISCONTINUED | OUTPATIENT
Start: 2017-09-02 | End: 2017-09-06

## 2017-09-02 RX ORDER — ACETAMINOPHEN 500 MG
650 TABLET ORAL ONCE
Qty: 0 | Refills: 0 | Status: COMPLETED | OUTPATIENT
Start: 2017-09-02 | End: 2017-09-02

## 2017-09-02 RX ADMIN — Medication 650 MILLIGRAM(S): at 15:45

## 2017-09-02 RX ADMIN — Medication 100 MILLIGRAM(S): at 15:41

## 2017-09-02 RX ADMIN — SODIUM CHLORIDE 75 MILLILITER(S): 9 INJECTION, SOLUTION INTRAVENOUS at 18:15

## 2017-09-02 RX ADMIN — Medication 100 MILLIGRAM(S): at 23:01

## 2017-09-02 RX ADMIN — SODIUM CHLORIDE 75 MILLILITER(S): 9 INJECTION, SOLUTION INTRAVENOUS at 23:03

## 2017-09-02 RX ADMIN — SODIUM CHLORIDE 1000 MILLILITER(S): 9 INJECTION INTRAMUSCULAR; INTRAVENOUS; SUBCUTANEOUS at 15:41

## 2017-09-02 RX ADMIN — PANTOPRAZOLE SODIUM 40 MILLIGRAM(S): 20 TABLET, DELAYED RELEASE ORAL at 23:01

## 2017-09-02 RX ADMIN — Medication 650 MILLIGRAM(S): at 16:40

## 2017-09-02 NOTE — ED PROVIDER NOTE - CARE PLAN
Principal Discharge DX:	Left breast abscess  Instructions for follow-up, activity and diet:	IV abx, us,  Secondary Diagnosis:	Breast infection in female

## 2017-09-02 NOTE — ED PROVIDER NOTE - MEDICAL DECISION MAKING DETAILS
55y/o F with PMH DCIS L breast s/p bilateral nipple sparing mastectomies with bilateral sentinel lymph node  with Dr. Talley and bilateral breast reconstruction with insertion of 350cc tissue expanders with Dr. Cline p/w  swelling of left breast and pain and subjective fevers . cbc, cmp, vbg, blood culture, clinda, us breast, admit

## 2017-09-02 NOTE — H&P ADULT - HISTORY OF PRESENT ILLNESS
Ms. Mackenzie is a 57 year old woman, who is well known to the plastic surgery service. She is 5 months status post bilateral nipple areolar sparing mastectomy and immediate reconstruction with subpectoral tissue expanders and alloderm sling. Her post-operative course was uneventful and her last office vist with Dr. Cline (plastic surgeon) 4 days ago was uneventful. Approximately 3 days ago, she experienced rigors and fatigue and noticed this morning that her L breast was red, swollen, and mildly tender. She does not recall any fevers. She denies any recent dental or surgical procedures. She has not had any viral illnesses or infections. She notes only a recent camping/outdoors trip where she went [employer]

## 2017-09-02 NOTE — H&P ADULT - ASSESSMENT
57 year old woman who was 5months s/p uneventful bilateral mastectomy and reconstruction with tissue expanders, now with left breast cellulitis and probable fluid collection. No clear precipitating event leading to bacterial seeding of L breast prosthesis or skin, but trauma/seeding of breast skin camping/kayaking is possible. Patient wishes to avoid surgical explantation of prosthesis if possible.    Plan:  - Admit to Dr. Cline  - Empiric IV antibiotics - Clindamycin (allergic to penicillins, no recent hospital/surgical exposure)  - L breast US  - Possible IR drainage pending US vs. RTOR for washout.

## 2017-09-02 NOTE — ED PROVIDER NOTE - ATTENDING CONTRIBUTION TO CARE
57F with pmh L breast DCIS s/p bilat nipple sparing mastectomies, s/p breast expanders by dr terry several mo ago presents with L breast redness, warmth. +myalgias. no f/c. no n/v/d. no dysuria or hematuria. pt sent in by dr henao for admission for possible surgery.     PE: NAD, NCAT, MMM, Trachea midline, Normal conjunctiva, lungs CTAB, S1/S2 RRR, Normal perfusion, Abdomen Soft, NTND, No rebound/guarding, No LE edema, No deformity of extremities. +diffuse erythema, warmth to L breast, without fluctuance  No focal motor or sensory deficits.     57F with L breast warmth, concerning for cellulitis, abscess. Plastic surgery aware, requests admission. Will check labs, cultures, give antibiotics, pre-op labs, admit. pt currently comfortable. - Joni Yeager MD

## 2017-09-02 NOTE — ED PROVIDER NOTE - OBJECTIVE STATEMENT
58y/o F with PMH DCIS L breast s/p bilateral nipple sparing mastectomies with bilateral sentinel lymph node  with Dr. Talley and bilateral breast reconstruction with insertion of 350cc tissue expanders with Dr. Cline p/w  swelling of left breast and pain and subjective fevers over 48 hrs. Pt. states she noticed swelling in her left breast along with redness and pain. She states she has some generalized chills with her pain. She denies cough , diarrhea, dysuria, abdominal pain, chest pain. She was seen by Dr. Cline in the office and was sent in the ED for admission.

## 2017-09-02 NOTE — H&P ADULT - PSH
H/O bilateral mastectomy    H/O breast biopsy  pt. can't recall which breast-2005- negative  Biopsy   H/O breast reconstruction    H/O rhinoplasty  1976  History of parathyroidectomy  10/2014  Liveborn by   c x 1 - Triplets

## 2017-09-02 NOTE — H&P ADULT - NSHPPHYSICALEXAM_GEN_ALL_CORE
R breast non-tender, no erythema, no palpable fluid collection, no wounds or drainage.  L breast mild tenderness to palpation, erythematous, fluid shift on palpation, visibly larger than contralateral breast, no wound or drainage.

## 2017-09-02 NOTE — ED ADULT TRIAGE NOTE - CHIEF COMPLAINT QUOTE
pt s/p b/l mastectomy with spacers placed arrives with redness and swelling to left breast. breast red and warm to touch. c/p subjective fevers.

## 2017-09-02 NOTE — ED ADULT NURSE NOTE - OBJECTIVE STATEMENT
Patient received to room 21 alert and oriented x 3 pt currently is complaining of left breast swelling with erythema since yesterday. Pt left breast in noted with erythema skin warm to touch and is swollen. An iv was accessed labs sent iv fluids were initiated as ordered and the patient was medicated as ordered.

## 2017-09-03 LAB
SPECIMEN SOURCE: SIGNIFICANT CHANGE UP
SPECIMEN SOURCE: SIGNIFICANT CHANGE UP

## 2017-09-03 PROCEDURE — 10030 IMG GID FLU COLL DRG SFT TIS: CPT

## 2017-09-03 RX ORDER — DIAZEPAM 5 MG
2.5 TABLET ORAL EVERY 6 HOURS
Qty: 0 | Refills: 0 | Status: DISCONTINUED | OUTPATIENT
Start: 2017-09-03 | End: 2017-09-06

## 2017-09-03 RX ADMIN — Medication 650 MILLIGRAM(S): at 14:39

## 2017-09-03 RX ADMIN — Medication 100 MILLIGRAM(S): at 21:52

## 2017-09-03 RX ADMIN — Medication 650 MILLIGRAM(S): at 20:40

## 2017-09-03 RX ADMIN — Medication 100 MILLIGRAM(S): at 14:03

## 2017-09-03 RX ADMIN — Medication 100 MILLIGRAM(S): at 06:22

## 2017-09-03 RX ADMIN — Medication 2.5 MILLIGRAM(S): at 21:56

## 2017-09-03 RX ADMIN — Medication 650 MILLIGRAM(S): at 15:30

## 2017-09-03 RX ADMIN — Medication 650 MILLIGRAM(S): at 21:40

## 2017-09-03 NOTE — PROGRESS NOTE ADULT - ASSESSMENT
A/P: 56F s/p b/l breast recon w/ Hugh (3/30/17) now with L breast cellulitis and seroma on U/S  - IR aspiration of L breast collection  - IV abx  - Pain control  - DVT ppx  - IS  - Ambulate as tolerated  - NPO for possible procedure, possibly change to regular per IR

## 2017-09-03 NOTE — PROGRESS NOTE ADULT - SUBJECTIVE AND OBJECTIVE BOX
Plastic Surgery Progress Note (p. [id number])    SUBJECTIVE:  Pt was admitted via ED yesterday for left breast cellulitis and collection; no issues overnight; no subjective fevers/chills; pain is well controlled/minimal; no N/V.    OBJECTIVE:     ** VITAL SIGNS / I&O's **    Vital Signs Last 24 Hrs  T(C): 37.4 (03 Sep 2017 06:26), Max: 37.4 (03 Sep 2017 06:26)  T(F): 99.3 (03 Sep 2017 06:26), Max: 99.3 (03 Sep 2017 06:26)  HR: 95 (03 Sep 2017 06:26) (79 - 97)  BP: 95/54 (03 Sep 2017 06:26) (95/54 - 144/93)  BP(mean): --  RR: 18 (03 Sep 2017 06:26) (16 - 18)  SpO2: 99% (03 Sep 2017 06:26) (97% - 100%)      02 Sep 2017 07:01  -  03 Sep 2017 07:00  --------------------------------------------------------  IN:    lactated ringers.: 75 mL    Sodium Chloride 0.9% IV Bolus: 1000 mL  Total IN: 1075 mL    OUT:    Voided: 500 mL  Total OUT: 500 mL    Total NET: 575 mL          ** PHYSICAL EXAM **    -- CONSTITUTIONAL: AOx3. NAD.   -- CHEST: Left breast erythema is minimally improved from yesterday; very palpable/ballotable collection; no drainage; incision well-healed.  Right breast without issues.      ** LABS **                          13.8   17.34 )-----------( 206      ( 02 Sep 2017 15:47 )             40.8     02 Sep 2017 15:47    136    |  97     |  22     ----------------------------<  99     3.3     |  26     |  0.86     Ca    9.2        02 Sep 2017 15:47  Phos  2.4       02 Sep 2017 15:47  Mg     2.1       02 Sep 2017 15:47    TPro  7.3    /  Alb  4.0    /  TBili  1.5    /  DBili  x      /  AST  20     /  ALT  22     /  AlkPhos  73     02 Sep 2017 15:47    PT/INR - ( 02 Sep 2017 15:47 )   PT: 15.6 SEC;   INR: 1.38          PTT - ( 02 Sep 2017 15:47 )  PTT:28.4 SEC    L BREAST U/S: IMPRESSION: No abscess identified in the visualized left breast.  Fluid surrounding the tissue expander compatible with a seroma.  Mild subcutaneous edema primarily in the upper outer quadrant of the   breast.

## 2017-09-04 LAB
GRAM STN WND: SIGNIFICANT CHANGE UP
SPECIMEN SOURCE: SIGNIFICANT CHANGE UP

## 2017-09-04 RX ORDER — SENNA PLUS 8.6 MG/1
2 TABLET ORAL AT BEDTIME
Qty: 0 | Refills: 0 | Status: DISCONTINUED | OUTPATIENT
Start: 2017-09-04 | End: 2017-09-06

## 2017-09-04 RX ORDER — DOCUSATE SODIUM 100 MG
100 CAPSULE ORAL THREE TIMES A DAY
Qty: 0 | Refills: 0 | Status: DISCONTINUED | OUTPATIENT
Start: 2017-09-04 | End: 2017-09-06

## 2017-09-04 RX ADMIN — Medication 650 MILLIGRAM(S): at 16:44

## 2017-09-04 RX ADMIN — Medication 650 MILLIGRAM(S): at 06:28

## 2017-09-04 RX ADMIN — Medication 650 MILLIGRAM(S): at 23:00

## 2017-09-04 RX ADMIN — Medication 100 MILLIGRAM(S): at 22:00

## 2017-09-04 RX ADMIN — Medication 650 MILLIGRAM(S): at 07:27

## 2017-09-04 RX ADMIN — Medication 100 MILLIGRAM(S): at 06:28

## 2017-09-04 RX ADMIN — Medication 100 MILLIGRAM(S): at 13:59

## 2017-09-04 RX ADMIN — Medication 650 MILLIGRAM(S): at 14:41

## 2017-09-04 NOTE — PROGRESS NOTE ADULT - SUBJECTIVE AND OBJECTIVE BOX
Doing well. Had IR drainage of L breast fluid collection.    ICU Vital Signs Last 24 Hrs  T(C): 36.7 (04 Sep 2017 06:25), Max: 37.4 (03 Sep 2017 08:48)  T(F): 98 (04 Sep 2017 06:25), Max: 99.4 (03 Sep 2017 08:48)  HR: 75 (04 Sep 2017 06:25) (75 - 95)  BP: 98/57 (04 Sep 2017 06:25) (98/55 - 117/63)  RR: 18 (04 Sep 2017 06:25) (16 - 18)  SpO2: 98% (04 Sep 2017 06:25) (98% - 100%)    L breast edema/erythema moderately improved.  No wound dehiscence or drainage.

## 2017-09-04 NOTE — PROGRESS NOTE ADULT - ASSESSMENT
5 months s/p bilateral breast reconstruction with tissue expanders and ADM. Developed seroma and cellulitis of L breast 3 days ago, now s/p IR drainage of collection. Cellulitis improving with IV abx.    - Continue IV abx  - F/U cultures, CBC  - ambulate PRN, regular diet

## 2017-09-05 ENCOUNTER — TRANSCRIPTION ENCOUNTER (OUTPATIENT)
Age: 57
End: 2017-09-05

## 2017-09-05 LAB
HCT VFR BLD CALC: 38.7 % — SIGNIFICANT CHANGE UP (ref 34.5–45)
HGB BLD-MCNC: 13.1 G/DL — SIGNIFICANT CHANGE UP (ref 11.5–15.5)
MCHC RBC-ENTMCNC: 29.5 PG — SIGNIFICANT CHANGE UP (ref 27–34)
MCHC RBC-ENTMCNC: 33.9 % — SIGNIFICANT CHANGE UP (ref 32–36)
MCV RBC AUTO: 87.2 FL — SIGNIFICANT CHANGE UP (ref 80–100)
NRBC # FLD: 0 — SIGNIFICANT CHANGE UP
PLATELET # BLD AUTO: 218 K/UL — SIGNIFICANT CHANGE UP (ref 150–400)
PMV BLD: 9.7 FL — SIGNIFICANT CHANGE UP (ref 7–13)
RBC # BLD: 4.44 M/UL — SIGNIFICANT CHANGE UP (ref 3.8–5.2)
RBC # FLD: 12.7 % — SIGNIFICANT CHANGE UP (ref 10.3–14.5)
SPECIMEN SOURCE: SIGNIFICANT CHANGE UP
WBC # BLD: 4.84 K/UL — SIGNIFICANT CHANGE UP (ref 3.8–10.5)
WBC # FLD AUTO: 4.84 K/UL — SIGNIFICANT CHANGE UP (ref 3.8–10.5)

## 2017-09-05 RX ADMIN — Medication 650 MILLIGRAM(S): at 00:00

## 2017-09-05 RX ADMIN — Medication 650 MILLIGRAM(S): at 06:08

## 2017-09-05 RX ADMIN — Medication 650 MILLIGRAM(S): at 06:38

## 2017-09-05 RX ADMIN — Medication 100 MILLIGRAM(S): at 06:08

## 2017-09-05 RX ADMIN — Medication 100 MILLIGRAM(S): at 14:02

## 2017-09-05 RX ADMIN — Medication 100 MILLIGRAM(S): at 14:05

## 2017-09-05 RX ADMIN — Medication 100 MILLIGRAM(S): at 21:54

## 2017-09-05 RX ADMIN — Medication 650 MILLIGRAM(S): at 23:12

## 2017-09-05 NOTE — DISCHARGE NOTE ADULT - CARE PROVIDERS DIRECT ADDRESSES
,[email or web address]

## 2017-09-05 NOTE — DISCHARGE NOTE ADULT - CONDITIONS AT DISCHARGE
Pt is alert and oriented. Vital signs are stable. Pain controlled. Pt has left breast BEN x1.   Pt tolerating regular diet; no N/V.

## 2017-09-05 NOTE — DISCHARGE NOTE ADULT - MEDICATION SUMMARY - MEDICATIONS TO TAKE
I will START or STAY ON the medications listed below when I get home from the hospital:    Keflex 500 mg oral capsule  -- 1 cap(s) by mouth 4 times a day  -- Finish all this medication unless otherwise directed by prescriber.    -- Indication: For Anti-infective    Dexilant 30 mg oral delayed release capsule  -- 1 cap(s) by mouth 2x/week  -- Indication: For Home med

## 2017-09-05 NOTE — PROGRESS NOTE ADULT - SUBJECTIVE AND OBJECTIVE BOX
Plastic Surgery Progress Note (p. [id number])    SUBJECTIVE:  Doing well. No overnight events. Pt slept comfortably; pain well controlled; no subjective fevers/chills.    OBJECTIVE:     ** VITAL SIGNS / I&O's **    Vital Signs Last 24 Hrs  T(C): 36.6 (05 Sep 2017 06:04), Max: 36.7 (04 Sep 2017 17:19)  T(F): 97.8 (05 Sep 2017 06:04), Max: 98.1 (04 Sep 2017 17:19)  HR: 78 (05 Sep 2017 06:04) (73 - 84)  BP: 100/62 (05 Sep 2017 06:04) (98/51 - 107/59)  BP(mean): --  RR: 18 (05 Sep 2017 06:04) (18 - 20)  SpO2: 99% (05 Sep 2017 06:04) (98% - 100%)      04 Sep 2017 07:01  -  05 Sep 2017 07:00  --------------------------------------------------------  IN:    IV PiggyBack: 100 mL  Total IN: 100 mL    OUT:    Drain: 65 mL  Total OUT: 65 mL    Total NET: 35 mL          ** PHYSICAL EXAM **    -- CONSTITUTIONAL: AOx3. NAD.   -- CHEST: Left breast w/ improving cellulitis; IR drain site is c/d/i; drainage is serous.      ** LABS **                          13.1   4.84  )-----------( 218      ( 05 Sep 2017 06:21 )             38.7

## 2017-09-05 NOTE — DISCHARGE NOTE ADULT - PLAN OF CARE
Postoperative Recovery Resume normal diet. Avoid straining, exercise, or heavy lifting.    Take medications as instructed by prescriptions.    Monitor and clean drains as instructed.    Please sponge bathe only until your first follow-up appointment.    Follow-up with primary care doctor as well.     Call 911 and return to the ED for chest pain, shortness of breath, significant increase in pain, or significant change in color of surgical sites.

## 2017-09-05 NOTE — DISCHARGE NOTE ADULT - INSTRUCTIONS
The patient may resume a regular diet. Keep BEN drain dsg C/D/I. Do not submerge under water. If BEN drain falls off by accident, please go to ER or see MD.

## 2017-09-05 NOTE — DISCHARGE NOTE ADULT - HOSPITAL COURSE
56F was admitted to [facility] on 9/3/17. The patient had a left breast infection and required admission for IV abx and drainage of a left breast collection. She went to have IR aspiration w/ drain placement; aspiration yielded 140cc of cloudy/serous fluid. The breast erythema improved w/ continuing IV abx. 56F was admitted to [facility] on 9/3/17. The patient had a left breast infection and required admission for IV abx and drainage of a left breast collection. She went to have IR aspiration w/ drain placement; aspiration yielded 140cc of cloudy/serous fluid. The breast erythema improved w/ continuing IV abx. She was seen by ID (Dr. Figueroa) who recommended continuing clindamycin PO as an outpatient. She was discharged on HD 5. She had no fevers/chills, N/V, or diarrhea at the time of d/c and was ambulating well, voiding, and tolerating PO.

## 2017-09-05 NOTE — PROGRESS NOTE ADULT - ASSESSMENT
A/P: 56F w/ improving L breast cellulitis and s/p IR drainage of left breast collection  - C/w IR drain  - C/w clindamycin  - f/u Cx's  - Pain control  - Ambulate as tolerated  - Regular diet  - Possible d/c home

## 2017-09-05 NOTE — DISCHARGE NOTE ADULT - PATIENT PORTAL LINK FT
“You can access the [facility] Patient Portal, offered by [facility], by registering with the following website: [email or web address]”

## 2017-09-05 NOTE — DISCHARGE NOTE ADULT - ADDITIONAL INSTRUCTIONS
Please follow up with Dr. Cline within x1 week after discharge from the hospital. You may call [phone] to schedule an appointment.

## 2017-09-05 NOTE — DISCHARGE NOTE ADULT - CARE PLAN
Principal Discharge DX:	Breast infection in female  Goal:	Postoperative Recovery  Instructions for follow-up, activity and diet:	Resume normal diet. Avoid straining, exercise, or heavy lifting.    Take medications as instructed by prescriptions.    Monitor and clean drains as instructed.    Please sponge bathe only until your first follow-up appointment.    Follow-up with primary care doctor as well.     Call 911 and return to the ED for chest pain, shortness of breath, significant increase in pain, or significant change in color of surgical sites.

## 2017-09-05 NOTE — DISCHARGE NOTE ADULT - CARE PROVIDER_API CALL
Dom Cline (MD), Plastic Surgery  [address]  [address]  [place] [address]  Phone: [phone]  Fax: [phone]

## 2017-09-06 VITALS
DIASTOLIC BLOOD PRESSURE: 68 MMHG | SYSTOLIC BLOOD PRESSURE: 105 MMHG | TEMPERATURE: 98 F | OXYGEN SATURATION: 100 % | HEART RATE: 85 BPM | RESPIRATION RATE: 17 BRPM

## 2017-09-06 LAB
-  CEFAZOLIN: SIGNIFICANT CHANGE UP
-  CIPROFLOXACIN: SIGNIFICANT CHANGE UP
-  CLINDAMYCIN: SIGNIFICANT CHANGE UP
-  ERYTHROMYCIN: SIGNIFICANT CHANGE UP
-  GENTAMICIN: SIGNIFICANT CHANGE UP
-  MOXIFLOXACIN(AEROBIC): SIGNIFICANT CHANGE UP
-  OXACILLIN: SIGNIFICANT CHANGE UP
-  PENICILLIN: SIGNIFICANT CHANGE UP
-  RIFAMPIN.: SIGNIFICANT CHANGE UP
-  TETRACYCLINE: SIGNIFICANT CHANGE UP
-  TRIMETHOPRIM/SULFAMETHOXAZOLE: SIGNIFICANT CHANGE UP
-  VANCOMYCIN: SIGNIFICANT CHANGE UP
CULTURE - SURGICAL SITE: SIGNIFICANT CHANGE UP
GRAM STN WND: SIGNIFICANT CHANGE UP
METHOD TYPE: SIGNIFICANT CHANGE UP
ORGANISM # SPEC MICROSCOPIC CNT: SIGNIFICANT CHANGE UP
ORGANISM # SPEC MICROSCOPIC CNT: SIGNIFICANT CHANGE UP

## 2017-09-06 PROCEDURE — 99254 IP/OBS CNSLTJ NEW/EST MOD 60: CPT | Mod: GC

## 2017-09-06 RX ORDER — CEPHALEXIN 500 MG
1 CAPSULE ORAL
Qty: 40 | Refills: 0 | OUTPATIENT
Start: 2017-09-06 | End: 2017-09-16

## 2017-09-06 RX ADMIN — Medication 100 MILLIGRAM(S): at 14:07

## 2017-09-06 RX ADMIN — Medication 100 MILLIGRAM(S): at 06:15

## 2017-09-06 RX ADMIN — Medication 650 MILLIGRAM(S): at 00:00

## 2017-09-06 NOTE — PROGRESS NOTE ADULT - SUBJECTIVE AND OBJECTIVE BOX
Plastic Surgery Progress Note (p. [id number])    SUBJECTIVE:  Doing well. No overnight events. No pain; no N/V; no fevers/chills.    OBJECTIVE:     ** VITAL SIGNS / I&O's **    Vital Signs Last 24 Hrs  T(C): 36.7 (06 Sep 2017 06:14), Max: 36.8 (05 Sep 2017 21:14)  T(F): 98.1 (06 Sep 2017 06:14), Max: 98.3 (05 Sep 2017 21:14)  HR: 66 (06 Sep 2017 06:14) (66 - 76)  BP: 101/61 (06 Sep 2017 06:14) (101/61 - 113/76)  BP(mean): --  RR: 15 (06 Sep 2017 06:14) (15 - 16)  SpO2: 97% (06 Sep 2017 06:14) (97% - 100%)      05 Sep 2017 07:01  -  06 Sep 2017 07:00  --------------------------------------------------------  IN:    IV PiggyBack: 150 mL  Total IN: 150 mL    OUT:    Drain: 22.5 mL  Total OUT: 22.5 mL    Total NET: 127.5 mL          ** PHYSICAL EXAM **    -- CONSTITUTIONAL: AOx3. NAD.   -- CHEST: Significant improvement in left breast erythema; drain is still serous    ** LABS **                          13.1   4.84  )-----------( 218      ( 05 Sep 2017 06:21 )             38.7     A/P: MF s/p (POD      - Pain control  - IS  - Ambulate  - DVT prophylaxis

## 2017-09-06 NOTE — CONSULT NOTE ADULT - ATTENDING COMMENTS
Lt breast cellulitis/ abscess in woman s/p bilateral mastectomy with reconstruction w expanders 3/2017 s/p IR aspiration of fluid growing MSSA. Clinically improved; no erythema noted on exam. Drain in place. Blood cultures negative.  h/o PCN allergy age 3.    When ready to d/c home would transition to keflex 500 mg po q 6 hours (tolerated cefazolin 3/2017) for at least 2 weeks.

## 2017-09-06 NOTE — CONSULT NOTE ADULT - SUBJECTIVE AND OBJECTIVE BOX
HPI:  Ms. Mackenzie is a 57 year old woman, who is well known to the plastic surgery service. She is 5 months status post bilateral nipple areolar sparing mastectomy and immediate reconstruction with subpectoral tissue expanders and alloderm sling. Her post-operative course was uneventful and her last office vist with Dr. Cline (plastic surgeon) 4 days ago was uneventful. Approximately 3 days ago, she experienced rigors and fatigue and noticed this morning that her L breast was red, swollen, and mildly tender. She does not recall any fevers. She denies any recent dental or surgical procedures. She has not had any viral illnesses or infections. She notes only a recent camping/outdoors trip where she went [employer]. (02 Sep 2017 15:29)      PAST MEDICAL & SURGICAL HISTORY:  Hyperparathyroidism  Spontaneous :   GERD (gastroesophageal reflux disease)  H/O breast reconstruction  H/O bilateral mastectomy  History of parathyroidectomy: 10/2014  Liveborn by : c x 1 - Triplets  H/O rhinoplasty:   H/O breast biopsy: pt. can&#x27;t recall which breast-2005- negative  Biopsy       Allergies  morphine (Other)  penicillin (Unknown)        ANTIMICROBIALS:  clindamycin IVPB 600 every 8 hours      OTHER MEDS: MEDICATIONS  (STANDING):  pantoprazole    Tablet 40 daily PRN  acetaminophen   Tablet. 650 every 6 hours PRN  acetaminophen   Tablet 650 every 6 hours PRN  diazepam    Tablet 2.5 every 6 hours PRN  senna 2 at bedtime  docusate sodium 100 three times a day      SOCIAL HISTORY:  [ ] etoh [ ] tobacco [ ] former smoker [ ] IVDU    FAMILY HISTORY:  Family history of malignant melanoma of skin  Family history of lung cancer  Family history of breast cancer in mother      REVIEW OF SYSTEMS  [  ] ROS unobtainable because:    [  ] All other systems negative except as noted below:	    Constitutional:  [ ] fever [ ] weight loss  Skin:  [ ] rash [ ] phlebitis	  Eyes: [ ] icterus [ ] inflammation	  ENMT: [ ] discharge [ ] thrush [ ] ulcers [ ] exudates  Respiratory: [ ] dyspnea [ ] hemoptysis [ ] cough [ ] sputum	  Cardiovascular:  [ ] chest pain [ ] palpitations [ ] edema	  Gastrointestinal:  [ ] nausea [ ] vomiting [ ] diarrhea [ ] constipation [ ] pain	  Genitourinary:  [ ] dysuria [ ] frequency [ ] hematuria [ ] discharge [ ] flank pain  Musculoskeletal:  [ ] myalgias [ ] arthralgias [ ] arthritis	  Neurological:  [ ] headache [ ] seizures	  Psychiatric:  [ ] anxiety [ ] depression	  Hematology/Lymphatics:  [ ] lymphadenopathy  Endocrine:  [ ] adrenal [ ] thyroid  Allergic/Immunologic:	 [ ] transplant [ ] seasonal    Vital Signs Last 24 Hrs  T(F): 97.5 (17 @ 10:24), Max: 99.4 (17 @ 08:48)    Vital Signs Last 24 Hrs  HR: 84 (17 @ 10:24) (66 - 84)  BP: 102/67 (17 @ 10:24) (101/61 - 113/76)  RR: 16 (17 @ 10:24)  SpO2: 100% (17 @ 10:24) (97% - 100%)  Wt(kg): --    PHYSICAL EXAM:  General: non-toxic  HEAD/EYES: anicteric, PERRL  ENT:  supple  Cardiovascular:   S1, S2  Respiratory:  clear bilaterally  GI:  soft, non-tender, normal bowel sounds  :  no CVA tenderness   Musculoskeletal:  no synovitis  Neurologic:  grossly non-focal  Skin:  no rash  Lymph: no lymphadenopathy  Psychiatric:  appropriate affect  Vascular:  no phlebitis                                13.1   4.84  )-----------( 218      ( 05 Sep 2017 06:21 )             38.7                   MICROBIOLOGY:          v            RADIOLOGY: HPI:  Ms. Mackenzie is a 57 year old woman, who is well known to the plastic surgery service. She is 5 months status post bilateral nipple areolar sparing mastectomy and immediate reconstruction with subpectoral tissue expanders and alloderm sling. Her post-operative course was uneventful and her last office vist with Dr. Cline (plastic surgeon) 4 days ago was uneventful. Approximately 3 days ago, she experienced rigors and fatigue and noticed this morning that her L breast was red, swollen, and mildly tender. She does not recall any fevers. She denies any recent dental or surgical procedures. She has not had any viral illnesses or infections. She notes only a recent camping/outdoors trip where she went [employer]. (02 Sep 2017 15:29)      PAST MEDICAL & SURGICAL HISTORY:  Hyperparathyroidism  Spontaneous :   GERD (gastroesophageal reflux disease)  H/O breast reconstruction  H/O bilateral mastectomy  History of parathyroidectomy: 10/2014  Liveborn by : c x 1 - Triplets  H/O rhinoplasty:   H/O breast biopsy: pt. can&#x27;t recall which breast-2005- negative  Biopsy       Allergies  morphine (Other)  penicillin (Unknown)        ANTIMICROBIALS:  clindamycin IVPB 600 every 8 hours      OTHER MEDS: MEDICATIONS  (STANDING):  pantoprazole    Tablet 40 daily PRN  acetaminophen   Tablet. 650 every 6 hours PRN  acetaminophen   Tablet 650 every 6 hours PRN  diazepam    Tablet 2.5 every 6 hours PRN  senna 2 at bedtime  docusate sodium 100 three times a day      SOCIAL HISTORY:  [ ] etoh [ ] tobacco [ ] former smoker [ ] IVDU    FAMILY HISTORY:  Family history of malignant melanoma of skin  Family history of lung cancer  Family history of breast cancer in mother      REVIEW OF SYSTEMS  [  ] ROS unobtainable because:    [ X ] All other systems negative except as noted below:	    Constitutional:  [ ] fever [ ] weight loss  Skin:  [X ] rash [ ] phlebitis	  Eyes: [ ] icterus [ ] inflammation	  ENMT: [ ] discharge [ ] thrush [ ] ulcers [ ] exudates  Respiratory: [ ] dyspnea [ ] hemoptysis [ ] cough [ ] sputum	  Cardiovascular:  [ ] chest pain [ ] palpitations [ ] edema	  Gastrointestinal:  [ ] nausea [ ] vomiting [ ] diarrhea [ ] constipation [ ] pain	  Genitourinary:  [ ] dysuria [ ] frequency [ ] hematuria [ ] discharge [ ] flank pain  Musculoskeletal:  [ ] myalgias [ ] arthralgias [ ] arthritis	  Neurological:  [ ] headache [ ] seizures	  Psychiatric:  [ ] anxiety [ ] depression	  Hematology/Lymphatics:  [ ] lymphadenopathy  Endocrine:  [ ] adrenal [ ] thyroid  Allergic/Immunologic:	 [ ] transplant [ ] seasonal    Vital Signs Last 24 Hrs  T(F): 97.5 (17 @ 10:24), Max: 99.4 (17 @ 08:48)    Vital Signs Last 24 Hrs  HR: 84 (17 @ 10:24) (66 - 84)  BP: 102/67 (17 @ 10:24) (101/61 - 113/76)  RR: 16 (17 @ 10:24)  SpO2: 100% (17 @ 10:24) (97% - 100%)  Wt(kg): --    PHYSICAL EXAM:  General: non-toxic  HEAD/EYES: anicteric, PERRL  ENT:  supple  Cardiovascular:   S1, S2 B/L BREAST EXPNADERS, LEFT BREAST DRAIN INTACT, NO REDNESS WARMTH TENDERNESS OR ERYTHEMA  Respiratory:  clear bilaterally  GI:  soft, non-tender, normal bowel sounds  :  no CVA tenderness   Musculoskeletal:  no synovitis  Neurologic:  grossly non-focal  Skin: BACK WELL HEALED SURGICAL SCAR  Lymph: no lymphadenopathy  Psychiatric:  appropriate affect  Vascular:  no phlebitis                                13.1   4.84  )-----------( 218      ( 05 Sep 2017 06:21 )             38.7                   MICROBIOLOGY:    Culture - Yeast and Fungus (17 @ 00:36)    Culture - Yeast and Fungus:   CULTURE NEGATIVE FOR YEASTS AND MOLDS AFTER 1 DAY  CULTURE NEGATIVE FOR YEASTS AND MOLDS AFTER 2 DAYS    Specimen Source: DRAINAGE    Culture - Surg Site Aerob/Anaer w/Gm St (17 @ 00:36)    -  Ciprofloxacin: S <=1 HALI    -  Moxifloxacin(Aerobic): S <=0.5 HALI    -  Oxacillin: S <=0.25 HALI    -  Rifampin: S <=1 HALI    -  Trimethoprim/Sulfamethoxazole: S <=0.5/9.5 HALI    Culture - Surgical Site:   FEW    -  Clindamycin: S <=0.5 HALI    -  Gentamicin: S <=4 HALI    -  Tetra/Doxy: S <=4 HALI    -  Cefazolin: S <=4 HALI    -  Penicillin: R >8 HALI    -  Erythromycin: S <=0.5 HALI    -  Vancomycin: S 2 HALI    Gram Stain Wound:   WBC White Blood Cells  QNTY CELLS IN GRAM STAIN: NO CELLS SEEN  NOS^No Organisms Seen    Specimen Source: DRAINAGE    Organism Identification: Staphylococcus aureus    Organism: Staphylococcus aureus    Method Type: MICROSCAN POS COMBO 34          v            RADIOLOGY:  < from: US Breast Limited, Left (17 @ 16:11) >  IMPRESSION:  No abscess identified in the visualized left breast.  Fluid surrounding the tissue expander compatible with a seroma.  Mild subcutaneous edema primarily in the upper outer quadrant of the   breast.    < end of copied text > HPI:  Ms. Mackenzie is a 57 year old woman, who is well known to the plastic surgery service. She is 5 months status post bilateral nipple areolar sparing mastectomy and immediate reconstruction with subpectoral tissue expanders and alloderm sling. Her post-operative course was uneventful and her last office vist with Dr. Cline (plastic surgeon) 4 days ago was uneventful. Approximately 3 days ago, she experienced rigors and fatigue and noticed this morning that her L breast was red, swollen, and mildly tender. She does not recall any fevers. She denies any recent dental or surgical procedures. She has not had any viral illnesses or infections. She notes only a recent camping/outdoors trip where she went [employer]. (02 Sep 2017 15:29)      PAST MEDICAL & SURGICAL HISTORY:  Hyperparathyroidism  Spontaneous :   GERD (gastroesophageal reflux disease)  H/O breast reconstruction  H/O bilateral mastectomy  History of parathyroidectomy: 10/2014  Liveborn by : c x 1 - Triplets  H/O rhinoplasty:   H/O breast biopsy: pt. can&#x27;t recall which breast-2005- negative  Biopsy       Allergies  morphine (Other)  penicillin (Unknown)        ANTIMICROBIALS:  clindamycin IVPB 600 every 8 hours      OTHER MEDS: MEDICATIONS  (STANDING):  pantoprazole    Tablet 40 daily PRN  acetaminophen   Tablet. 650 every 6 hours PRN  acetaminophen   Tablet 650 every 6 hours PRN  diazepam    Tablet 2.5 every 6 hours PRN  senna 2 at bedtime  docusate sodium 100 three times a day      SOCIAL HISTORY:  [ ] etoh [ ] tobacco [ ] former smoker [ ] IVDU lives home with     FAMILY HISTORY:  Family history of malignant melanoma of skin  Family history of lung cancer  Family history of breast cancer in mother      REVIEW OF SYSTEMS  [  ] ROS unobtainable because:    [ X ] All other systems negative except as noted below:	    Constitutional:  [ ] fever [ ] weight loss  Skin:  [X ] rash [ ] phlebitis	  Eyes: [ ] icterus [ ] inflammation	  ENMT: [ ] discharge [ ] thrush [ ] ulcers [ ] exudates  Respiratory: [ ] dyspnea [ ] hemoptysis [ ] cough [ ] sputum	  Cardiovascular:  [ ] chest pain [ ] palpitations [ ] edema	  Gastrointestinal:  [ ] nausea [ ] vomiting [ ] diarrhea [ ] constipation [ ] pain	  Genitourinary:  [ ] dysuria [ ] frequency [ ] hematuria [ ] discharge [ ] flank pain  Musculoskeletal:  [ ] myalgias [ ] arthralgias [ ] arthritis	  Neurological:  [ ] headache [ ] seizures	  Psychiatric:  [ ] anxiety [ ] depression	  Hematology/Lymphatics:  [ ] lymphadenopathy  Endocrine:  [ ] adrenal [ ] thyroid  Allergic/Immunologic:	 [ ] transplant [ ] seasonal    Vital Signs Last 24 Hrs  T(F): 97.5 (17 @ 10:24), Max: 99.4 (17 @ 08:48)    Vital Signs Last 24 Hrs  HR: 84 (17 @ 10:24) (66 - 84)  BP: 102/67 (17 @ 10:24) (101/61 - 113/76)  RR: 16 (17 @ 10:24)  SpO2: 100% (17 @ 10:24) (97% - 100%)  Wt(kg): --    PHYSICAL EXAM:  General: non-toxic  HEAD/EYES: anicteric, PERRL  ENT:  supple  Cardiovascular:   S1, S2 B/L BREAST EXPNADERS, LEFT BREAST DRAIN INTACT, NO REDNESS WARMTH TENDERNESS OR ERYTHEMA  Respiratory:  clear bilaterally  GI:  soft, non-tender, normal bowel sounds  :  no CVA tenderness   Musculoskeletal:  no synovitis  Neurologic:  grossly non-focal  Skin: BACK WELL HEALED SURGICAL SCAR  Lymph: no lymphadenopathy  Psychiatric:  appropriate affect  Vascular:  no phlebitis                                13.1   4.84  )-----------( 218      ( 05 Sep 2017 06:21 )             38.7                   MICROBIOLOGY:    Culture - Yeast and Fungus (17 @ 00:36)    Culture - Yeast and Fungus:   CULTURE NEGATIVE FOR YEASTS AND MOLDS AFTER 1 DAY  CULTURE NEGATIVE FOR YEASTS AND MOLDS AFTER 2 DAYS    Specimen Source: DRAINAGE    Culture - Surg Site Aerob/Anaer w/Gm St (17 @ 00:36)    -  Ciprofloxacin: S <=1 HALI    -  Moxifloxacin(Aerobic): S <=0.5 HALI    -  Oxacillin: S <=0.25 HALI    -  Rifampin: S <=1 HALI    -  Trimethoprim/Sulfamethoxazole: S <=0.5/9.5 HALI    Culture - Surgical Site:   FEW    -  Clindamycin: S <=0.5 HALI    -  Gentamicin: S <=4 HALI    -  Tetra/Doxy: S <=4 HALI    -  Cefazolin: S <=4 HALI    -  Penicillin: R >8 HALI    -  Erythromycin: S <=0.5 HALI    -  Vancomycin: S 2 HALI    Gram Stain Wound:   WBC White Blood Cells  QNTY CELLS IN GRAM STAIN: NO CELLS SEEN  NOS^No Organisms Seen    Specimen Source: DRAINAGE    Organism Identification: Staphylococcus aureus    Organism: Staphylococcus aureus    Method Type: MICROSCAN POS COMBO 34          v            RADIOLOGY:  < from: US Breast Limited, Left (17 @ 16:11) >  IMPRESSION:  No abscess identified in the visualized left breast.  Fluid surrounding the tissue expander compatible with a seroma.  Mild subcutaneous edema primarily in the upper outer quadrant of the   breast.    < end of copied text >

## 2017-09-06 NOTE — CONSULT NOTE ADULT - ASSESSMENT
Patient seen with Dr Hendrix. Can Dc on keflex 500mg every 6 hours for at least 2 weeks 57 year old woman 5 months status post bilateral nipple areolar sparing mastectomy and immediate reconstruction with subpectoral tissue expanders and alloderm sling now presenting with left breast cellulitis and abscess, s/p IR drainage of 140 cc pus, culture growing MSSA. Blood cultures negative. Patient has remote h/o pen allergy however she received cefazolin post op without any reactions.  Currently on clindamycin day # 5  Patient seen with Dr Figueroa.   Can Dc on keflex 500mg every 6 hours for at least 2 weeks, will need to follow up this week for drain removal.

## 2017-09-11 ENCOUNTER — OUTPATIENT (OUTPATIENT)
Dept: OUTPATIENT SERVICES | Facility: HOSPITAL | Age: 57
LOS: 1 days | End: 2017-09-11
Payer: COMMERCIAL

## 2017-09-11 VITALS
HEART RATE: 70 BPM | WEIGHT: 139.99 LBS | SYSTOLIC BLOOD PRESSURE: 116 MMHG | DIASTOLIC BLOOD PRESSURE: 70 MMHG | TEMPERATURE: 98 F | RESPIRATION RATE: 14 BRPM | HEIGHT: 65 IN | OXYGEN SATURATION: 100 %

## 2017-09-11 DIAGNOSIS — C50.919 MALIGNANT NEOPLASM OF UNSPECIFIED SITE OF UNSPECIFIED FEMALE BREAST: ICD-10-CM

## 2017-09-11 DIAGNOSIS — Z90.13 ACQUIRED ABSENCE OF BILATERAL BREASTS AND NIPPLES: Chronic | ICD-10-CM

## 2017-09-11 DIAGNOSIS — Z41.1 ENCOUNTER FOR COSMETIC SURGERY: Chronic | ICD-10-CM

## 2017-09-11 DIAGNOSIS — Z98.82 BREAST IMPLANT STATUS: Chronic | ICD-10-CM

## 2017-09-11 DIAGNOSIS — Z01.818 ENCOUNTER FOR OTHER PREPROCEDURAL EXAMINATION: ICD-10-CM

## 2017-09-11 DIAGNOSIS — Z98.89 OTHER SPECIFIED POSTPROCEDURAL STATES: Chronic | ICD-10-CM

## 2017-09-11 DIAGNOSIS — Z98.890 OTHER SPECIFIED POSTPROCEDURAL STATES: ICD-10-CM

## 2017-09-11 DIAGNOSIS — E89.2 POSTPROCEDURAL HYPOPARATHYROIDISM: Chronic | ICD-10-CM

## 2017-09-11 LAB
ANION GAP SERPL CALC-SCNC: 15 MMOL/L — SIGNIFICANT CHANGE UP (ref 5–17)
BUN SERPL-MCNC: 26 MG/DL — HIGH (ref 7–23)
CALCIUM SERPL-MCNC: 9.5 MG/DL — SIGNIFICANT CHANGE UP (ref 8.4–10.5)
CHLORIDE SERPL-SCNC: 103 MMOL/L — SIGNIFICANT CHANGE UP (ref 96–108)
CO2 SERPL-SCNC: 22 MMOL/L — SIGNIFICANT CHANGE UP (ref 22–31)
CREAT SERPL-MCNC: 0.99 MG/DL — SIGNIFICANT CHANGE UP (ref 0.5–1.3)
GLUCOSE SERPL-MCNC: 86 MG/DL — SIGNIFICANT CHANGE UP (ref 70–99)
POTASSIUM SERPL-MCNC: 4.4 MMOL/L — SIGNIFICANT CHANGE UP (ref 3.5–5.3)
POTASSIUM SERPL-SCNC: 4.4 MMOL/L — SIGNIFICANT CHANGE UP (ref 3.5–5.3)
SODIUM SERPL-SCNC: 140 MMOL/L — SIGNIFICANT CHANGE UP (ref 135–145)

## 2017-09-11 PROCEDURE — 80048 BASIC METABOLIC PNL TOTAL CA: CPT

## 2017-09-11 PROCEDURE — G0463: CPT

## 2017-09-11 RX ORDER — SODIUM CHLORIDE 9 MG/ML
3 INJECTION INTRAMUSCULAR; INTRAVENOUS; SUBCUTANEOUS EVERY 8 HOURS
Qty: 0 | Refills: 0 | Status: DISCONTINUED | OUTPATIENT
Start: 2017-09-14 | End: 2017-09-29

## 2017-09-11 RX ORDER — LIDOCAINE HCL 20 MG/ML
0.2 VIAL (ML) INJECTION ONCE
Qty: 0 | Refills: 0 | Status: DISCONTINUED | OUTPATIENT
Start: 2017-09-14 | End: 2017-09-29

## 2017-09-11 RX ORDER — DEXLANSOPRAZOLE 30 MG/1
1 CAPSULE, DELAYED RELEASE ORAL
Qty: 0 | Refills: 0 | COMMUNITY

## 2017-09-11 NOTE — H&P PST ADULT - NSANTHOSAYNRD_GEN_A_CORE
No. EDMUND screening performed.  STOP BANG Legend: 0-2 = LOW Risk; 3-4 = INTERMEDIATE Risk; 5-8 = HIGH Risk No. EDMUND screening performed.  STOP BANG Legend: 0-2 = LOW Risk; 3-4 = INTERMEDIATE Risk; 5-8 = HIGH Risk/neck = 12.75 inches

## 2017-09-11 NOTE — H&P PST ADULT - PROBLEM SELECTOR PLAN 1
Revision of left breast reconstruction  Removal of left chest tissue expander  Replacement of left chest tissue expander

## 2017-09-11 NOTE — H&P PST ADULT - NS MD HP INPLANTS MED DEV
bilateral breast expanders, ports - bilateral breasts, drain - left breast bilateral breast expanders, ports - bilateral breasts, BEN drain - left breast

## 2017-09-11 NOTE — H&P PST ADULT - PMH
GERD (gastroesophageal reflux disease)    Hyperparathyroidism    Spontaneous   1997 Ductal carcinoma in situ (DCIS) of left breast  2017  GERD (gastroesophageal reflux disease)    Hyperparathyroidism    Spontaneous

## 2017-09-11 NOTE — H&P PST ADULT - VISION (WITH CORRECTIVE LENSES IF THE PATIENT USUALLY WEARS THEM):
Normal vision: sees adequately in most situations; can see medication labels, newsprint Partially impaired: cannot see medication labels or newsprint, but can see obstacles in path, and the surrounding layout; can count fingers at arm's length/reading glasses

## 2017-09-11 NOTE — H&P PST ADULT - PSH
H/O breast biopsy  pt. can't recall which breast-2005- negative  Biopsy   H/O rhinoplasty  1976  History of parathyroidectomy  10/2014  Liveborn by   c x 1 - Triplets H/O bilateral mastectomy    H/O breast biopsy  pt. can't recall which breast-2005- negative  Biopsy 2017 - left - DCIS  H/O breast reconstruction    H/O rhinoplasty  1976  History of parathyroidectomy  10/2014  Liveborn by   c x 1 - Triplets

## 2017-09-11 NOTE — H&P PST ADULT - HISTORY OF PRESENT ILLNESS
55 y/o healthy female presents for preop evaluation for Bilateral Total Mastectomies, bilateral Axillary [place] Lymph Node Biopsies Possible Bilateral Axillary Node Dissection, Bilateral Tissue Expanders, Bilateral Axillary Closure, B/L Alloderm, Bilateral Muscle Flaps on 03/30/17. Pt states he went for routine mammogram & left breast mass seen. Pt had sonogram & MRI of breasts with biopsy & surgery recommended.. 56 yo female with h/o DCIS of the left breast s/p bilateral mastectomy 3/30/2017. Pt states that on 9/3/17 she was admitted to [facility] for painless swelling and erythema of the left breast. Pt was diagnosed with infection of the left breast and is s/p aspiration and BEN drain insertion in IR. Pt received IV antibiotics and was discharged home on po antibiotics. She is scheduled for revision of left breast reconstruction, removal of left chest tissue expander and replacement of left chest tissue expander on 9/14/17.

## 2017-09-14 ENCOUNTER — OUTPATIENT (OUTPATIENT)
Dept: OUTPATIENT SERVICES | Facility: HOSPITAL | Age: 57
LOS: 1 days | End: 2017-09-14
Payer: COMMERCIAL

## 2017-09-14 VITALS
WEIGHT: 139.99 LBS | RESPIRATION RATE: 16 BRPM | SYSTOLIC BLOOD PRESSURE: 108 MMHG | TEMPERATURE: 98 F | OXYGEN SATURATION: 100 % | DIASTOLIC BLOOD PRESSURE: 64 MMHG | HEIGHT: 65 IN | HEART RATE: 74 BPM

## 2017-09-14 VITALS
RESPIRATION RATE: 18 BRPM | OXYGEN SATURATION: 99 % | DIASTOLIC BLOOD PRESSURE: 62 MMHG | HEART RATE: 76 BPM | SYSTOLIC BLOOD PRESSURE: 119 MMHG | TEMPERATURE: 98 F

## 2017-09-14 DIAGNOSIS — C50.919 MALIGNANT NEOPLASM OF UNSPECIFIED SITE OF UNSPECIFIED FEMALE BREAST: ICD-10-CM

## 2017-09-14 DIAGNOSIS — Z41.1 ENCOUNTER FOR COSMETIC SURGERY: Chronic | ICD-10-CM

## 2017-09-14 DIAGNOSIS — Z98.89 OTHER SPECIFIED POSTPROCEDURAL STATES: Chronic | ICD-10-CM

## 2017-09-14 DIAGNOSIS — Z90.13 ACQUIRED ABSENCE OF BILATERAL BREASTS AND NIPPLES: Chronic | ICD-10-CM

## 2017-09-14 DIAGNOSIS — E89.2 POSTPROCEDURAL HYPOPARATHYROIDISM: Chronic | ICD-10-CM

## 2017-09-14 DIAGNOSIS — Z98.82 BREAST IMPLANT STATUS: Chronic | ICD-10-CM

## 2017-09-14 PROCEDURE — 19380 REVJ RECONSTRUCTED BREAST: CPT | Mod: LT

## 2017-09-14 PROCEDURE — 19342 INSJ/RPLCMT BRST IMPLT SEP D: CPT | Mod: 59,LT

## 2017-09-14 PROCEDURE — C1789: CPT

## 2017-09-14 PROCEDURE — 87070 CULTURE OTHR SPECIMN AEROBIC: CPT

## 2017-09-14 PROCEDURE — 87186 SC STD MICRODIL/AGAR DIL: CPT

## 2017-09-14 PROCEDURE — 11970 RPLCMT TISS XPNDR PERM IMPLT: CPT | Mod: LT

## 2017-09-14 RX ORDER — FAMOTIDINE 10 MG/ML
1 INJECTION INTRAVENOUS
Qty: 0 | Refills: 0 | COMMUNITY

## 2017-09-14 RX ORDER — APREPITANT 80 MG/1
40 CAPSULE ORAL ONCE
Qty: 0 | Refills: 0 | Status: COMPLETED | OUTPATIENT
Start: 2017-09-14 | End: 2017-09-14

## 2017-09-14 RX ORDER — CELECOXIB 200 MG/1
200 CAPSULE ORAL ONCE
Qty: 0 | Refills: 0 | Status: COMPLETED | OUTPATIENT
Start: 2017-09-14 | End: 2017-09-14

## 2017-09-14 RX ORDER — ONDANSETRON 8 MG/1
4 TABLET, FILM COATED ORAL ONCE
Qty: 0 | Refills: 0 | Status: DISCONTINUED | OUTPATIENT
Start: 2017-09-14 | End: 2017-09-29

## 2017-09-14 RX ORDER — HYDROMORPHONE HYDROCHLORIDE 2 MG/ML
0.5 INJECTION INTRAMUSCULAR; INTRAVENOUS; SUBCUTANEOUS
Qty: 0 | Refills: 0 | Status: DISCONTINUED | OUTPATIENT
Start: 2017-09-14 | End: 2017-09-14

## 2017-09-14 RX ORDER — ACETAMINOPHEN 500 MG
975 TABLET ORAL ONCE
Qty: 0 | Refills: 0 | Status: COMPLETED | OUTPATIENT
Start: 2017-09-14 | End: 2017-09-14

## 2017-09-14 RX ORDER — CELECOXIB 200 MG/1
200 CAPSULE ORAL ONCE
Qty: 0 | Refills: 0 | Status: DISCONTINUED | OUTPATIENT
Start: 2017-09-14 | End: 2017-09-29

## 2017-09-14 RX ORDER — OXYCODONE HYDROCHLORIDE 5 MG/1
5 TABLET ORAL ONCE
Qty: 0 | Refills: 0 | Status: DISCONTINUED | OUTPATIENT
Start: 2017-09-14 | End: 2017-09-14

## 2017-09-14 RX ORDER — SODIUM CHLORIDE 9 MG/ML
1000 INJECTION, SOLUTION INTRAVENOUS
Qty: 0 | Refills: 0 | Status: DISCONTINUED | OUTPATIENT
Start: 2017-09-14 | End: 2017-09-29

## 2017-09-14 RX ADMIN — Medication 975 MILLIGRAM(S): at 15:30

## 2017-09-14 RX ADMIN — CELECOXIB 200 MILLIGRAM(S): 200 CAPSULE ORAL at 15:30

## 2017-09-14 RX ADMIN — APREPITANT 40 MILLIGRAM(S): 80 CAPSULE ORAL at 15:37

## 2017-09-14 NOTE — ASU DISCHARGE PLAN (ADULT/PEDIATRIC). - MEDICATION SUMMARY - MEDICATIONS TO TAKE
I will START or STAY ON the medications listed below when I get home from the hospital:    Tylenol 500 mg oral tablet  -- 2 tab(s) by mouth every 6 hours, As Needed  -- Indication: For Mild pain    Keflex 500 mg oral capsule  -- 1 cap(s) by mouth 4 times a day  -- Finish all this medication unless otherwise directed by prescriber.    -- Indication: For antibiotics

## 2017-09-14 NOTE — ASU PATIENT PROFILE, ADULT - PMH
Ductal carcinoma in situ (DCIS) of left breast  2017  GERD (gastroesophageal reflux disease)    Hyperparathyroidism    Spontaneous

## 2017-09-14 NOTE — ASU DISCHARGE PLAN (ADULT/PEDIATRIC). - ITEMS TO FOLLOWUP WITH YOUR PHYSICIAN'S
Leave all dressings in place until follow up next week. Take medications prescribed by Dr. Cline's office as directed.

## 2017-09-14 NOTE — ASU PATIENT PROFILE, ADULT - PSH
H/O bilateral mastectomy    H/O breast biopsy  pt. can't recall which breast-2005- negative  Biopsy 2017 - left - DCIS  H/O breast reconstruction    H/O rhinoplasty  1976  History of parathyroidectomy  10/2014  Liveborn by   c x 1 - Triplets

## 2017-09-14 NOTE — ASU DISCHARGE PLAN (ADULT/PEDIATRIC). - NOTIFY
Fever greater than 101/Bleeding that does not stop/Pain not relieved by Medications Inability to Tolerate Liquids or Foods/Persistent Nausea and Vomiting/Bleeding that does not stop/Swelling that continues/Unable to Urinate/Fever greater than 101/Pain not relieved by Medications

## 2017-09-14 NOTE — ASU DISCHARGE PLAN (ADULT/PEDIATRIC). - MEDICATION SUMMARY - MEDICATIONS TO STOP TAKING
I will STOP taking the medications listed below when I get home from the hospital:    famotidine 20 mg oral tablet  -- 1 tab(s) by mouth on 9/13/17 pm and 1 tab by mouth on 9/14/17 am

## 2017-09-14 NOTE — PRE-ANESTHESIA EVALUATION ADULT - NSANTHOSAYNRD_GEN_A_CORE
No. EDMNUD screening performed.  STOP BANG Legend: 0-2 = LOW Risk; 3-4 = INTERMEDIATE Risk; 5-8 = HIGH Risk/neck = 12.75 inches

## 2017-09-16 LAB
BACTERIA BLD CULT: SIGNIFICANT CHANGE UP
BACTERIA BLD CULT: SIGNIFICANT CHANGE UP

## 2017-09-17 LAB
-  AMPICILLIN/SULBACTAM: SIGNIFICANT CHANGE UP
-  CEFAZOLIN: SIGNIFICANT CHANGE UP
-  CIPROFLOXACIN: SIGNIFICANT CHANGE UP
-  CLINDAMYCIN: SIGNIFICANT CHANGE UP
-  DAPTOMYCIN: SIGNIFICANT CHANGE UP
-  ERYTHROMYCIN: SIGNIFICANT CHANGE UP
-  GENTAMICIN: SIGNIFICANT CHANGE UP
-  LEVOFLOXACIN: SIGNIFICANT CHANGE UP
-  LINEZOLID: SIGNIFICANT CHANGE UP
-  MOXIFLOXACIN(AEROBIC): SIGNIFICANT CHANGE UP
-  OXACILLIN: SIGNIFICANT CHANGE UP
-  PENICILLIN: SIGNIFICANT CHANGE UP
-  RIFAMPIN: SIGNIFICANT CHANGE UP
-  TETRACYCLINE: SIGNIFICANT CHANGE UP
-  TRIMETHOPRIM/SULFAMETHOXAZOLE: SIGNIFICANT CHANGE UP
-  VANCOMYCIN: SIGNIFICANT CHANGE UP
CULTURE RESULTS: SIGNIFICANT CHANGE UP
METHOD TYPE: SIGNIFICANT CHANGE UP
ORGANISM # SPEC MICROSCOPIC CNT: SIGNIFICANT CHANGE UP
ORGANISM # SPEC MICROSCOPIC CNT: SIGNIFICANT CHANGE UP
SPECIMEN SOURCE: SIGNIFICANT CHANGE UP

## 2017-10-02 LAB — FUNGUS SPEC QL CULT: SIGNIFICANT CHANGE UP

## 2017-11-06 ENCOUNTER — OUTPATIENT (OUTPATIENT)
Dept: OUTPATIENT SERVICES | Facility: HOSPITAL | Age: 57
LOS: 1 days | End: 2017-11-06
Payer: COMMERCIAL

## 2017-11-06 VITALS
OXYGEN SATURATION: 98 % | DIASTOLIC BLOOD PRESSURE: 84 MMHG | WEIGHT: 139.99 LBS | HEIGHT: 65 IN | HEART RATE: 72 BPM | TEMPERATURE: 98 F | SYSTOLIC BLOOD PRESSURE: 125 MMHG | RESPIRATION RATE: 18 BRPM

## 2017-11-06 DIAGNOSIS — Z41.1 ENCOUNTER FOR COSMETIC SURGERY: Chronic | ICD-10-CM

## 2017-11-06 DIAGNOSIS — E89.2 POSTPROCEDURAL HYPOPARATHYROIDISM: Chronic | ICD-10-CM

## 2017-11-06 DIAGNOSIS — Z98.82 BREAST IMPLANT STATUS: Chronic | ICD-10-CM

## 2017-11-06 DIAGNOSIS — Z90.13 ACQUIRED ABSENCE OF BILATERAL BREASTS AND NIPPLES: Chronic | ICD-10-CM

## 2017-11-06 DIAGNOSIS — D05.12 INTRADUCTAL CARCINOMA IN SITU OF LEFT BREAST: ICD-10-CM

## 2017-11-06 DIAGNOSIS — Z01.818 ENCOUNTER FOR OTHER PREPROCEDURAL EXAMINATION: ICD-10-CM

## 2017-11-06 DIAGNOSIS — Z98.89 OTHER SPECIFIED POSTPROCEDURAL STATES: Chronic | ICD-10-CM

## 2017-11-06 DIAGNOSIS — C50.919 MALIGNANT NEOPLASM OF UNSPECIFIED SITE OF UNSPECIFIED FEMALE BREAST: ICD-10-CM

## 2017-11-06 LAB
HCT VFR BLD CALC: 42.7 % — SIGNIFICANT CHANGE UP (ref 34.5–45)
HGB BLD-MCNC: 14.3 G/DL — SIGNIFICANT CHANGE UP (ref 11.5–15.5)
MCHC RBC-ENTMCNC: 29.9 PG — SIGNIFICANT CHANGE UP (ref 27–34)
MCHC RBC-ENTMCNC: 33.5 GM/DL — SIGNIFICANT CHANGE UP (ref 32–36)
MCV RBC AUTO: 89.1 FL — SIGNIFICANT CHANGE UP (ref 80–100)
PLATELET # BLD AUTO: 223 K/UL — SIGNIFICANT CHANGE UP (ref 150–400)
RBC # BLD: 4.79 M/UL — SIGNIFICANT CHANGE UP (ref 3.8–5.2)
RBC # FLD: 13.6 % — SIGNIFICANT CHANGE UP (ref 10.3–14.5)
WBC # BLD: 6.74 K/UL — SIGNIFICANT CHANGE UP (ref 3.8–10.5)
WBC # FLD AUTO: 6.74 K/UL — SIGNIFICANT CHANGE UP (ref 3.8–10.5)

## 2017-11-06 PROCEDURE — 85027 COMPLETE CBC AUTOMATED: CPT

## 2017-11-06 PROCEDURE — G0463: CPT

## 2017-11-06 RX ORDER — LIDOCAINE HCL 20 MG/ML
0.2 VIAL (ML) INJECTION ONCE
Qty: 0 | Refills: 0 | Status: DISCONTINUED | OUTPATIENT
Start: 2017-11-13 | End: 2017-11-13

## 2017-11-06 RX ORDER — SODIUM CHLORIDE 9 MG/ML
3 INJECTION INTRAMUSCULAR; INTRAVENOUS; SUBCUTANEOUS EVERY 8 HOURS
Qty: 0 | Refills: 0 | Status: DISCONTINUED | OUTPATIENT
Start: 2017-11-13 | End: 2017-11-13

## 2017-11-06 RX ORDER — ACETAMINOPHEN 500 MG
2 TABLET ORAL
Qty: 0 | Refills: 0 | COMMUNITY

## 2017-11-06 NOTE — H&P PST ADULT - HISTORY OF PRESENT ILLNESS
58 y/o F PMH stage 0 left breast CA, S/P bilateral mastectomies, developed a staph infection of the left expander.  Presents today for revision of left breast reconstruction, exchange of left chest tissue expander for implant.

## 2017-11-06 NOTE — H&P PST ADULT - PROBLEM SELECTOR PLAN 1
Revision of left breast reconstruction, exchange of left chest tissue expander for implant, left muscle flap, left capsulectomy

## 2017-11-13 ENCOUNTER — OUTPATIENT (OUTPATIENT)
Dept: OUTPATIENT SERVICES | Facility: HOSPITAL | Age: 57
LOS: 1 days | End: 2017-11-13
Payer: COMMERCIAL

## 2017-11-13 VITALS
DIASTOLIC BLOOD PRESSURE: 70 MMHG | OXYGEN SATURATION: 100 % | HEART RATE: 66 BPM | SYSTOLIC BLOOD PRESSURE: 112 MMHG | WEIGHT: 139.99 LBS | RESPIRATION RATE: 18 BRPM | TEMPERATURE: 98 F | HEIGHT: 65 IN

## 2017-11-13 VITALS
OXYGEN SATURATION: 100 % | DIASTOLIC BLOOD PRESSURE: 65 MMHG | SYSTOLIC BLOOD PRESSURE: 115 MMHG | TEMPERATURE: 98 F | HEART RATE: 93 BPM | RESPIRATION RATE: 16 BRPM

## 2017-11-13 DIAGNOSIS — Z90.13 ACQUIRED ABSENCE OF BILATERAL BREASTS AND NIPPLES: Chronic | ICD-10-CM

## 2017-11-13 DIAGNOSIS — Z98.82 BREAST IMPLANT STATUS: Chronic | ICD-10-CM

## 2017-11-13 DIAGNOSIS — E89.2 POSTPROCEDURAL HYPOPARATHYROIDISM: Chronic | ICD-10-CM

## 2017-11-13 DIAGNOSIS — Z98.89 OTHER SPECIFIED POSTPROCEDURAL STATES: Chronic | ICD-10-CM

## 2017-11-13 DIAGNOSIS — Z41.1 ENCOUNTER FOR COSMETIC SURGERY: Chronic | ICD-10-CM

## 2017-11-13 DIAGNOSIS — C50.919 MALIGNANT NEOPLASM OF UNSPECIFIED SITE OF UNSPECIFIED FEMALE BREAST: ICD-10-CM

## 2017-11-13 LAB
BLD GP AB SCN SERPL QL: NEGATIVE — SIGNIFICANT CHANGE UP
RH IG SCN BLD-IMP: POSITIVE — SIGNIFICANT CHANGE UP

## 2017-11-13 PROCEDURE — 15734 MUSCLE-SKIN GRAFT TRUNK: CPT | Mod: 58,59,LT

## 2017-11-13 PROCEDURE — 19380 REVJ RECONSTRUCTED BREAST: CPT | Mod: 58,59,RT

## 2017-11-13 PROCEDURE — 19342 INSJ/RPLCMT BRST IMPLT SEP D: CPT | Mod: 58,59,RT

## 2017-11-13 PROCEDURE — 86850 RBC ANTIBODY SCREEN: CPT

## 2017-11-13 PROCEDURE — 19342 INSJ/RPLCMT BRST IMPLT SEP D: CPT | Mod: 50

## 2017-11-13 PROCEDURE — 19371 PERI-IMPLT CAPSLC BRST COMPL: CPT | Mod: 58,59,LT

## 2017-11-13 PROCEDURE — 86901 BLOOD TYPING SEROLOGIC RH(D): CPT

## 2017-11-13 PROCEDURE — C1789: CPT

## 2017-11-13 PROCEDURE — 86900 BLOOD TYPING SEROLOGIC ABO: CPT

## 2017-11-13 RX ORDER — HYDROMORPHONE HYDROCHLORIDE 2 MG/ML
0.5 INJECTION INTRAMUSCULAR; INTRAVENOUS; SUBCUTANEOUS
Qty: 0 | Refills: 0 | Status: DISCONTINUED | OUTPATIENT
Start: 2017-11-13 | End: 2017-11-13

## 2017-11-13 RX ORDER — SODIUM CHLORIDE 9 MG/ML
1000 INJECTION, SOLUTION INTRAVENOUS
Qty: 0 | Refills: 0 | Status: DISCONTINUED | OUTPATIENT
Start: 2017-11-13 | End: 2017-11-28

## 2017-11-13 RX ORDER — ONDANSETRON 8 MG/1
4 TABLET, FILM COATED ORAL
Qty: 0 | Refills: 0 | Status: DISCONTINUED | OUTPATIENT
Start: 2017-11-13 | End: 2017-11-13

## 2017-11-13 RX ADMIN — SODIUM CHLORIDE 100 MILLILITER(S): 9 INJECTION, SOLUTION INTRAVENOUS at 12:45

## 2017-11-13 NOTE — ASU DISCHARGE PLAN (ADULT/PEDIATRIC). - ITEMS TO FOLLOWUP WITH YOUR PHYSICIAN'S
Please follow up with Dr. Cline within x1 week after discharge from the hospital. You may call [phone] to schedule an appointment.

## 2017-11-13 NOTE — ASU DISCHARGE PLAN (ADULT/PEDIATRIC). - MEDICATION SUMMARY - MEDICATIONS TO TAKE
I will START or STAY ON the medications listed below when I get home from the hospital:    Dexilant  -- 1 cap(s) by mouth , As Needed  -- Indication: For Home med    Norco 5 mg-325 mg oral tablet  -- 1 tab(s) by mouth every 6 hours  -- Indication: For Pain    clindamycin 300 mg oral capsule  -- 1 cap(s) by mouth every 6 hours  -- Indication: For Abx ppx

## 2017-11-13 NOTE — BRIEF OPERATIVE NOTE - PROCEDURE
<<-----Click on this checkbox to enter Procedure Revision of breast prosthesis  11/13/2017    Active  [id number]

## 2017-11-13 NOTE — ASU DISCHARGE PLAN (ADULT/PEDIATRIC). - NOTIFY
Bleeding that does not stop/Persistent Nausea and Vomiting/Increased Irritability or Sluggishness/Pain not relieved by Medications/Numbness, color, or temperature change to extremity/Swelling that continues/Fever greater than 101/Numbness, tingling/Excessive Diarrhea

## 2018-02-21 ENCOUNTER — OUTPATIENT (OUTPATIENT)
Dept: OUTPATIENT SERVICES | Facility: HOSPITAL | Age: 58
LOS: 1 days | End: 2018-02-21
Payer: COMMERCIAL

## 2018-02-21 VITALS
SYSTOLIC BLOOD PRESSURE: 110 MMHG | WEIGHT: 147.05 LBS | DIASTOLIC BLOOD PRESSURE: 80 MMHG | RESPIRATION RATE: 16 BRPM | HEART RATE: 70 BPM | TEMPERATURE: 98 F | HEIGHT: 64.5 IN

## 2018-02-21 DIAGNOSIS — E89.2 POSTPROCEDURAL HYPOPARATHYROIDISM: Chronic | ICD-10-CM

## 2018-02-21 DIAGNOSIS — Z98.82 BREAST IMPLANT STATUS: Chronic | ICD-10-CM

## 2018-02-21 DIAGNOSIS — Z98.89 OTHER SPECIFIED POSTPROCEDURAL STATES: Chronic | ICD-10-CM

## 2018-02-21 DIAGNOSIS — Z90.13 ACQUIRED ABSENCE OF BILATERAL BREASTS AND NIPPLES: Chronic | ICD-10-CM

## 2018-02-21 DIAGNOSIS — C50.919 MALIGNANT NEOPLASM OF UNSPECIFIED SITE OF UNSPECIFIED FEMALE BREAST: ICD-10-CM

## 2018-02-21 DIAGNOSIS — J03.91 ACUTE RECURRENT TONSILLITIS, UNSPECIFIED: ICD-10-CM

## 2018-02-21 DIAGNOSIS — Z41.1 ENCOUNTER FOR COSMETIC SURGERY: Chronic | ICD-10-CM

## 2018-02-21 LAB
BUN SERPL-MCNC: 25 MG/DL — HIGH (ref 7–23)
CALCIUM SERPL-MCNC: 8.9 MG/DL — SIGNIFICANT CHANGE UP (ref 8.4–10.5)
CHLORIDE SERPL-SCNC: 101 MMOL/L — SIGNIFICANT CHANGE UP (ref 98–107)
CO2 SERPL-SCNC: 27 MMOL/L — SIGNIFICANT CHANGE UP (ref 22–31)
CREAT SERPL-MCNC: 0.85 MG/DL — SIGNIFICANT CHANGE UP (ref 0.5–1.3)
GLUCOSE SERPL-MCNC: 67 MG/DL — LOW (ref 70–99)
HCT VFR BLD CALC: 43.7 % — SIGNIFICANT CHANGE UP (ref 34.5–45)
HGB BLD-MCNC: 14.8 G/DL — SIGNIFICANT CHANGE UP (ref 11.5–15.5)
MCHC RBC-ENTMCNC: 30.3 PG — SIGNIFICANT CHANGE UP (ref 27–34)
MCHC RBC-ENTMCNC: 33.9 % — SIGNIFICANT CHANGE UP (ref 32–36)
MCV RBC AUTO: 89.4 FL — SIGNIFICANT CHANGE UP (ref 80–100)
NRBC # FLD: 0 — SIGNIFICANT CHANGE UP
PLATELET # BLD AUTO: 240 K/UL — SIGNIFICANT CHANGE UP (ref 150–400)
PMV BLD: 10.5 FL — SIGNIFICANT CHANGE UP (ref 7–13)
POTASSIUM SERPL-MCNC: 4.1 MMOL/L — SIGNIFICANT CHANGE UP (ref 3.5–5.3)
POTASSIUM SERPL-SCNC: 4.1 MMOL/L — SIGNIFICANT CHANGE UP (ref 3.5–5.3)
RBC # BLD: 4.89 M/UL — SIGNIFICANT CHANGE UP (ref 3.8–5.2)
RBC # FLD: 12.6 % — SIGNIFICANT CHANGE UP (ref 10.3–14.5)
SODIUM SERPL-SCNC: 140 MMOL/L — SIGNIFICANT CHANGE UP (ref 135–145)
WBC # BLD: 5.11 K/UL — SIGNIFICANT CHANGE UP (ref 3.8–10.5)
WBC # FLD AUTO: 5.11 K/UL — SIGNIFICANT CHANGE UP (ref 3.8–10.5)

## 2018-02-21 PROCEDURE — 93010 ELECTROCARDIOGRAM REPORT: CPT

## 2018-02-21 RX ORDER — DEXLANSOPRAZOLE 30 MG/1
1 CAPSULE, DELAYED RELEASE ORAL
Qty: 0 | Refills: 0 | COMMUNITY

## 2018-02-21 NOTE — H&P PST ADULT - HISTORY OF PRESENT ILLNESS
58 y/o F PMH stage 0 left breast CA, S/P bilateral mastectomies, developed a staph infection of the left expander.  Presents today for revision of left breast reconstruction, exchange of left chest tissue expander for implant. 56 y/o female  PMH stage 0 left breast CA, S/P bilateral mastectomies with reconstruction,  h/o staph infection of the left expander.  H/o revision of left breast reconstruction, exchange of left chest tissue expander for implant.   Pt states mild deformity of B/l breast.  Scheduled for b/l breast reconstruction, b/l fat transfer. on 3/1/2018

## 2018-02-21 NOTE — H&P PST ADULT - FAMILY HISTORY
Mother  Still living? No  Family history of breast cancer in mother, Age at diagnosis: Age Unknown  Family history of malignant melanoma of skin, Age at diagnosis: Age Unknown     Father  Still living? No  Family history of lung cancer, Age at diagnosis: Age Unknown

## 2018-02-21 NOTE — H&P PST ADULT - REASON FOR ADMISSION
I had a staph infection of my breast, I'm having the expander replaced with an implant. "having reconstruction b/l breast"

## 2018-02-21 NOTE — H&P PST ADULT - NEGATIVE CARDIOVASCULAR SYMPTOMS
no claudication/no orthopnea/no paroxysmal nocturnal dyspnea/no peripheral edema/no chest pain/no palpitations/no dyspnea on exertion

## 2018-02-21 NOTE — H&P PST ADULT - PROBLEM SELECTOR PLAN 1
Revision of left breast reconstruction, exchange of left chest tissue expander for implant, left muscle flap, left capsulectomy Scheduled for b/l breast reconstruction, b/l fat transfer. on 3/1/2018  preop instructions, gi prophylaxis & surgical soap given  pt verbalized understanding

## 2018-03-01 ENCOUNTER — OUTPATIENT (OUTPATIENT)
Dept: OUTPATIENT SERVICES | Facility: HOSPITAL | Age: 58
LOS: 1 days | Discharge: HOME | End: 2018-03-01
Payer: COMMERCIAL

## 2018-03-01 VITALS
HEART RATE: 75 BPM | OXYGEN SATURATION: 100 % | DIASTOLIC BLOOD PRESSURE: 70 MMHG | TEMPERATURE: 98 F | RESPIRATION RATE: 14 BRPM | SYSTOLIC BLOOD PRESSURE: 117 MMHG

## 2018-03-01 VITALS
RESPIRATION RATE: 18 BRPM | WEIGHT: 147.05 LBS | DIASTOLIC BLOOD PRESSURE: 67 MMHG | HEART RATE: 73 BPM | SYSTOLIC BLOOD PRESSURE: 117 MMHG | HEIGHT: 64.5 IN | TEMPERATURE: 98 F | OXYGEN SATURATION: 100 %

## 2018-03-01 DIAGNOSIS — Z41.1 ENCOUNTER FOR COSMETIC SURGERY: Chronic | ICD-10-CM

## 2018-03-01 DIAGNOSIS — Z98.89 OTHER SPECIFIED POSTPROCEDURAL STATES: Chronic | ICD-10-CM

## 2018-03-01 DIAGNOSIS — Z90.13 ACQUIRED ABSENCE OF BILATERAL BREASTS AND NIPPLES: Chronic | ICD-10-CM

## 2018-03-01 DIAGNOSIS — E89.2 POSTPROCEDURAL HYPOPARATHYROIDISM: Chronic | ICD-10-CM

## 2018-03-01 DIAGNOSIS — C50.919 MALIGNANT NEOPLASM OF UNSPECIFIED SITE OF UNSPECIFIED FEMALE BREAST: ICD-10-CM

## 2018-03-01 DIAGNOSIS — Z98.82 BREAST IMPLANT STATUS: Chronic | ICD-10-CM

## 2018-03-01 PROCEDURE — 19380 REVJ RECONSTRUCTED BREAST: CPT | Mod: LT

## 2018-03-01 PROCEDURE — 20926: CPT | Mod: 59,LT

## 2018-03-01 PROCEDURE — 15877 SUCTION LIPECTOMY TRUNK: CPT | Mod: 59

## 2018-03-01 NOTE — ASU DISCHARGE PLAN (ADULT/PEDIATRIC). - PAIN
Do not mix Percocet with Tylenol (acetaminophen) as it already contains Tylenol./prescription given by MD Next dose of Percocet after 4:30 PM. Do not mix Percocet with Tylenol (acetaminophen) as it already contains Tylenol./prescription given by MD

## 2018-03-01 NOTE — BRIEF OPERATIVE NOTE - PROCEDURE
<<-----Click on this checkbox to enter Procedure Breast reconstruction  03/01/2018  revision of breast reconstruction, fat transfer and  grafting from B/L hips/ lip suction  Active  JENN

## 2018-03-01 NOTE — ASU DISCHARGE PLAN (ADULT/PEDIATRIC). - SPECIAL INSTRUCTIONS
Follow instructions given by Dr. Cline.    Wear bra and abdominal binder until you see MD in office. Follow instructions given by Dr. Cline.    Wear bra and abdominal binder as directed by Dr. Cline. Follow instructions given by Dr. Cline.    Wear bra and abdominal binder as directed by Dr. Cline.    DO NOT take any Tylenol (Acetaminophen) or narcotics containing Tylenol until after  4:30 PM. You received Tylenol during your operation and it can cause damage to your liver if too much is taken within a 24 hour time period.

## 2018-03-01 NOTE — ASU DISCHARGE PLAN (ADULT/PEDIATRIC). - NOTIFY
Unable to Urinate/Bleeding that does not stop/Pain not relieved by Medications/Persistent Nausea and Vomiting/Fever greater than 101/Swelling that continues

## 2018-08-10 PROBLEM — D05.12 INTRADUCTAL CARCINOMA IN SITU OF LEFT BREAST: Chronic | Status: ACTIVE | Noted: 2017-09-11

## 2018-09-13 ENCOUNTER — APPOINTMENT (OUTPATIENT)
Dept: OBGYN | Facility: CLINIC | Age: 58
End: 2018-09-13
Payer: COMMERCIAL

## 2018-09-13 ENCOUNTER — RESULT REVIEW (OUTPATIENT)
Age: 58
End: 2018-09-13

## 2018-09-13 PROCEDURE — 99396 PREV VISIT EST AGE 40-64: CPT

## 2018-10-09 ENCOUNTER — APPOINTMENT (OUTPATIENT)
Dept: SURGICAL ONCOLOGY | Facility: CLINIC | Age: 58
End: 2018-10-09
Payer: COMMERCIAL

## 2018-10-09 VITALS
HEIGHT: 65 IN | BODY MASS INDEX: 23.32 KG/M2 | DIASTOLIC BLOOD PRESSURE: 76 MMHG | WEIGHT: 140 LBS | HEART RATE: 85 BPM | RESPIRATION RATE: 15 BRPM | SYSTOLIC BLOOD PRESSURE: 118 MMHG

## 2018-10-09 PROCEDURE — 99214 OFFICE O/P EST MOD 30 MIN: CPT

## 2018-10-30 ENCOUNTER — OUTPATIENT (OUTPATIENT)
Dept: OUTPATIENT SERVICES | Facility: HOSPITAL | Age: 58
LOS: 1 days | Discharge: HOME | End: 2018-10-30

## 2018-10-30 DIAGNOSIS — D05.90 UNSPECIFIED TYPE OF CARCINOMA IN SITU OF UNSPECIFIED BREAST: ICD-10-CM

## 2018-10-30 DIAGNOSIS — Z41.1 ENCOUNTER FOR COSMETIC SURGERY: Chronic | ICD-10-CM

## 2018-10-30 DIAGNOSIS — Z98.89 OTHER SPECIFIED POSTPROCEDURAL STATES: Chronic | ICD-10-CM

## 2018-10-30 DIAGNOSIS — Z98.82 BREAST IMPLANT STATUS: Chronic | ICD-10-CM

## 2018-10-30 DIAGNOSIS — E89.2 POSTPROCEDURAL HYPOPARATHYROIDISM: Chronic | ICD-10-CM

## 2018-10-30 DIAGNOSIS — Z90.13 ACQUIRED ABSENCE OF BILATERAL BREASTS AND NIPPLES: Chronic | ICD-10-CM

## 2018-11-07 ENCOUNTER — APPOINTMENT (OUTPATIENT)
Dept: HEMATOLOGY ONCOLOGY | Facility: CLINIC | Age: 58
End: 2018-11-07
Payer: COMMERCIAL

## 2018-11-07 VITALS
DIASTOLIC BLOOD PRESSURE: 70 MMHG | HEIGHT: 64.96 IN | WEIGHT: 139.99 LBS | BODY MASS INDEX: 23.32 KG/M2 | RESPIRATION RATE: 16 BRPM | SYSTOLIC BLOOD PRESSURE: 120 MMHG | OXYGEN SATURATION: 100 % | TEMPERATURE: 98 F | HEART RATE: 76 BPM

## 2018-11-07 DIAGNOSIS — Z80.3 FAMILY HISTORY OF MALIGNANT NEOPLASM OF BREAST: ICD-10-CM

## 2018-11-07 PROCEDURE — 99205 OFFICE O/P NEW HI 60 MIN: CPT

## 2018-11-07 NOTE — OB HISTORY
[Post-Menopause, No Sxs] : post-menopausal, currently without symptoms [Menopause Age: ____] : age at menopause was [unfilled]

## 2018-11-13 PROBLEM — Z80.3 FAMILY HISTORY OF MALIGNANT NEOPLASM OF BREAST: Status: ACTIVE | Noted: 2017-02-09

## 2018-11-13 NOTE — HISTORY OF PRESENT ILLNESS
[de-identified] : Ms. MALISSA DONOHUE  is a 58 year old female here for an evaluation of breast cancer. Her oncologic history is as follows:\par \par She underwent routine breast imaging on 1/31/17 which showed dense breasts, new grouping of microcalcifications in the upper central left breast 4.8 cm FN. Unremarkable b/l breast ultrasound. Bx recommended.  She underwent left breast upper central core biopsy on 2/6/17 which showed  DCIS with high grade nuclear atypia and central necrosis. 3 mm in size. ER >90 % POSITIVE, PA > 90 % POSITIVE  She underwent a breast MRI on 2/14/18 which showed a 1.4 x 0.8 cm non-mass enhancement corresponding to Bx proven malignancy. A 4 mm focus of enhancement in upper central inner left breast Bx recommended. A 6 x 4 mm oval minimally enhancing mass in the lower outer right breast Right breast ultrasound with possible Bx recommended. \par \par She underwent B/L mastectomy on 3/30/17 which revealed Left breast DCIS  High grade measuring 0.2 cm margins clear. Lymphovascular invasion was absent. 1 sentinel lymph node was removed and negative for metastasis. Right breast benign fibrocystic changes with stromal fibrosis, focal ductal hyperplasia. Right axillary sentinel lymph node negative for malignancy.\par \par GYM 3 days a week

## 2018-11-13 NOTE — CONSULT LETTER
[Dear  ___] : Dear  [unfilled], [Consult Letter:] : I had the pleasure of evaluating your patient, [unfilled]. [Please see my note below.] : Please see my note below. [Consult Closing:] : Thank you very much for allowing me to participate in the care of this patient.  If you have any questions, please do not hesitate to contact me. [Sincerely,] : Sincerely, [FreeTextEntry3] : Celestina Montes De Oca M.D.\par  of Medicine\par [facility]\par [facility]\par [address] [place] [address]\par [address]\par Tele # [phone]; Fax [phone]\par  [DrSlava  ___] : Dr. ROSS [DrSlava ___] : Dr. ROSS

## 2018-11-13 NOTE — PHYSICAL EXAM
[Fully active, able to carry on all pre-disease performance without restriction] : Status 0 - Fully active, able to carry on all pre-disease performance without restriction [Normal] : affect appropriate [de-identified] : bl mastectomies with reconstruction. No CW or axillary nodules.

## 2018-11-13 NOTE — ASSESSMENT
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 She is status post bilateral mastectomies on 3/30/2017. No invasive carcinoma or lymphovascular invasion noted. [place] lymph nodes were negative. \par  \par I reviewed the natural history and treatment options for ductal carcinoma in situ. We reviewed risk of recurrence and breast cancer prevention options for patients with DCIS. Patient underwent definitive bilateral mastectomy. She does not need radiation therapy or endocrine therapy for risk reduction. She does not need breast imaging.\par \par She is very stressed about her son’s psychiatric illness. I reassured her and advised to continue his care at [facility]. I counselled her extensively about the role of healthy diet, regular exercise and stress free life style. \par \par She reports h/o genetic testing and she is BRCA negative\par  \par The patient had plenty of time to ask questions and all questions were answered to satisfaction. I gave my office phone number and encouraged to call with any questions or additional information. \par

## 2018-11-13 NOTE — RESULTS/DATA
[FreeTextEntry1] : Laboratory data, radiology and pathology reviewed in detail at the time of consultation.\par

## 2018-12-09 NOTE — BRIEF OPERATIVE NOTE - ASSISTANT(S)
Progress Notes by Kam Johnston DO at 03/20/17 08:19 AM     Author:  Kam Johnston DO Service:  (none) Author Type:  Physician     Filed:  03/20/17 08:57 AM Encounter Date:  3/20/2017 Status:  Signed     :  Kam Johnston DO (Physician)              The patient was seen and examined and the chart was reviewed this date.  Thank you for your referral.  My impressions and recommendations are as follows:    IMPRESSIONS:  1. Palpitations   2. Anxiety    RECOMMENDATIONS:  1. Ms. Spangler[FH1.1C]ta[FH1.1M] still remains symptomatic.   Echocardiogram shows EF is wnl, no valvular heart disease  holter shows PVCs as suspected.  No other arrhythmias are seen  Ideally a low dose BB is advised, although with her anxiety/depression, I do not recommend starting BB rx  She has just started zoloft which may reduce her stress and improved symptoms  Advised to improve hydration, and avoid caffine  Reassured she has no structural heart disease[FH1.2M]  2. Okay exercise as tolerated.  Target heart rate for her is likely 150-160 beats per minute at peak exercise.  Heart rate and also go up to 175 beats per minute at maximal exercise. As long as she does not develop any shortness of breath, chest pain, or worsening palpitations with exercise, she may safely continue as tolerated.  3. Follow-up in[FH1.1C] 3 months[FH1.2M] or sooner as needed    SUBJECTIVE:   Rimma Vitale is a[FH1.1C] 44 year old[FH1.1T] female who presents today for evaluation of palpitations.  Has been under tremendous stress recently.  She has 5 kids, most are teenagers.  She has been  for many years.[FH1.1C]   Watching 2 kids today, ages 2 and 3  Still with palpitations, present with any stress[FH1.2M]    Symptoms have been present[FH1.1C] since[FH1.2M]  1-2 cups coffee every morning, no pop.    No alcohol   Tried to exercise recently and was concerned that HR increased to 140 bpm.  She is not sure if that is safe    She denies any cp, 
Yahaira (R3)
pressure or sob with regular activity.   No recent lower extremity edema, weight gain, or shortness of breath with laying down. Denies claudication symptoms.  Compliant with meds, no side effects.  No recent hospitalizations noted.  No change in meds since my last visit.  All prior cardiology and primary care encounters reviewed, since last visit.  Pertinent prior cardiac testing is summarized below.    Electrocardiogram-sinus, HR 77 bpm   Echocardiogram-[FH1.1C] 3/2/2017 - EF 60%[FH1.1M]  48 hour holter monitor reviewed:  The predominant rhythm was sinus with average heart rate was 86 BPM. The minimum heart rate was 58  BPM, occurring at 3:11:39 AM. The maximum heart rate was 148 BPM, occurring at 7:43:28 AM. The patient appeared to have remained  in sinus rhythm for the majority of the recording time  Rare PVC's seen.  No other arrhythmias were seen. No pauses or AV block seen.[FH1.2C]   Stress test-NA[FH1.1C]      Past Medical History      Diagnosis   Date   •  (spontaneous vaginal delivery)       x 5           Past Surgical History       Procedure   Laterality Date   • Carpal tunnel release  Bilateral    • Removal gallbladder   2016   • Abdominal exploration surgery        stent - clean up sludge out of biliary duct.     • Egd deliver thermal energy sphnctr/cardia gerd      • Ercp[FH1.1T]       .    Allergies:[FH1.1C] No Known Allergies[FH1.1T]     Medications:[FH1.1C]   Current Outpatient Prescriptions     Medication  Sig   • sertraline (ZOLOFT) 50 MG tablet Take 0.5 Tabs by mouth daily. For a week then increase to 1 tablet.   • clonazepam (KLONOPIN) 0.5 MG tablet Take 1 Tab by mouth 2 (two) times daily as needed for Anxiety.[FH1.1T]       Social History:[FH1.1C]   History     Alcohol Use     • Yes      Comment: social      History    Smoking Status    • Never Smoker   Smokeless Tobacco    • Not on file     History    Drug Use No[FH1.1T]               Family History:[FH1.1C]   Family History     
  Problem   Relation Age of Onset   • Cancer  Father      Liver     • High Blood Pressure  Mother    • * Healthy  Sister    • * Healthy  Brother    • Stroke  Maternal Grandmother    • OTHER  Sister      myasthenia gravis          Review of Systems   Constitutional: Negative for[FH1.1T] diaphoresis[FH1.1C],[FH1.1T] malaise/fatigue[FH1.1C] and[FH1.1T] weight loss[FH1.1C].   Respiratory: Negative for[FH1.1T] shortness of breath[FH1.1C].    Cardiovascular: Positive for[FH1.1T] palpitations[FH1.1C]. Negative for[FH1.1T] chest pain[FH1.1C],[FH1.1T] orthopnea[FH1.1C],[FH1.1T] claudication[FH1.1C],[FH1.1T] leg swelling[FH1.1C] and[FH1.1T] PND[FH1.1C].   Gastrointestinal: Negative for[FH1.1T] abdominal pain[FH1.1C] and[FH1.1T] blood in stool[FH1.1C].   Musculoskeletal: Negative for[FH1.1T] falls[FH1.1C],[FH1.1T] joint pain[FH1.1C] and[FH1.1T] myalgias[FH1.1C].   Neurological: Negative for[FH1.1T] dizziness[FH1.1C],[FH1.1T] loss of consciousness[FH1.1C],[FH1.1T] weakness[FH1.1C] and[FH1.1T] headaches[FH1.1C].   Endo/Heme/Allergies:[FH1.1T] Does not bruise/bleed easily[FH1.1C].   Psychiatric/Behavioral: The patient[FH1.1T] is nervous/anxious[FH1.1C].[FH1.1T]        Is the patient diabetic? No    Depression Screening:  Over the past 2 weeks, has patient felt down, depressed or hopeless? No  Over the past 2 weeks, has patient felt little interest or pleasure in doing things? No    OBJECTIVE:[FH1.1C]  /74  Pulse 80  Resp 16  Ht 5' 3\" (1.6 m)  Wt 130 lb 12.8 oz (59.3 kg)  SpO2 99%  BMI 23.17 kg/m2    Physical Exam   Constitutional: She is[FH1.1T] oriented to person, place, and time[FH1.1C]. She appears[FH1.1T] well-developed[FH1.1C] and[FH1.1T] well-nourished[FH1.1C]. She is[FH1.1T] active[FH1.1C] and[FH1.1T] cooperative[FH1.1C].[FH1.1T] No distress[FH1.1C].   HENT:   Head:[FH1.1T] Normocephalic[FH1.1C] and[FH1.1T] atraumatic[FH1.1C].   Mouth/Throat:[FH1.1T] Oropharynx is clear and moist[FH1.1C].   Eyes: Right eye 
Jj
exhibits[FH1.1T] no discharge[FH1.1C]. Left eye exhibits[FH1.1T] no discharge[FH1.1C].[FH1.1T] No scleral icterus[FH1.1C].   Neck:[FH1.1T] Normal range of motion[FH1.1C].[FH1.1T] Neck supple[FH1.1C].[FH1.1T] No JVD[FH1.1C] present.[FH1.1T] Carotid bruit is not present[FH1.1C].[FH1.1T] No edema[FH1.1C] and[FH1.1T] no erythema[FH1.1C] present.   Cardiovascular:[FH1.1T] Normal rate[FH1.1C],[FH1.1T] regular rhythm[FH1.1C],[FH1.1T] S1 normal[FH1.1C],[FH1.1T] S2 normal[FH1.1C],[FH1.1T] normal heart sounds[FH1.1C] and[FH1.1T] intact distal pulses[FH1.1C].[FH1.1T]  PMI is not displaced[FH1.1C].  Exam reveals[FH1.1T] no gallop[FH1.1C].[FH1.1T]    No murmur[FH1.1C] heard.  Pulses:       Carotid pulses are[FH1.1T] 2+[FH1.1C] on the right side, and[FH1.1T] 2+[FH1.1C] on the left side.       Dorsalis pedis pulses are[FH1.1T] 2+[FH1.1C] on the right side, and[FH1.1T] 2+[FH1.1C] on the left side.        Posterior tibial pulses are[FH1.1T] 2+[FH1.1C] on the right side, and[FH1.1T] 2+[FH1.1C] on the left side.[FH1.1T]   Normal carotid upstrokes bilaterally[FH1.1C]   Pulmonary/Chest:[FH1.1T] Effort normal[FH1.1C] and[FH1.1T] breath sounds normal[FH1.1C]. No[FH1.1T] respiratory distress[FH1.1C]. She has[FH1.1T] no wheezes[FH1.1C]. She has[FH1.1T] no rales[FH1.1C]. She exhibits[FH1.1T] no tenderness[FH1.1C].   Abdominal:[FH1.1T] Soft[FH1.1C].[FH1.1T] Bowel sounds are normal[FH1.1C]. She exhibits[FH1.1T] no distension[FH1.1C]. There is[FH1.1T] no tenderness[FH1.1C]. There is[FH1.1T] no rebound[FH1.1C] and[FH1.1T] no guarding[FH1.1C].   Musculoskeletal:[FH1.1T] Normal range of motion[FH1.1C]. She exhibits no[FH1.1T] edema[FH1.1C] or[FH1.1T] tenderness[FH1.1C].   Neurological: She is[FH1.1T] alert[FH1.1C] and[FH1.1T] oriented to person, place, and time[FH1.1C].   Skin: Skin is[FH1.1T] warm[FH1.1C],[FH1.1T] dry[FH1.1C] and[FH1.1T] intact[FH1.1C].[FH1.1T] No rash[FH1.1C] noted. She is[FH1.1T] not diaphoretic[FH1.1C]. No[FH1.1T] 
erythema[FH1.1C]. No[FH1.1T] pallor[FH1.1C].   Psychiatric: She has a[FH1.1T] normal mood and affect[FH1.1C]. Her[FH1.1T] behavior is normal[FH1.1C].[FH1.1T] Judgment[FH1.1C] and[FH1.1T] thought content[FH1.1C] normal.[FH1.1T]       Lab / Testing:[FH1.1C]    No results found for: NA, K, CL, CO2, CA, GLUCOSE, BUN, CREAT, GFRNONAFAM, GFRAFAM No results found for: A1C, TSH, FRT4 No results found for: HGB, HCT, WBC, ANC, ALC, PLT No results found for: CHOL, HDL, LDL, TRIG No results found for: AST, ALT, ALKPHOS, ALB, BILI, BILIDIR, PROT, INR    Electronically Signed by:    Kam Johnston DO , 3/20/2017[FH1.1T]                       Revision History        User Key Date/Time User Provider Type Action    > FH1.2 03/20/17 08:57 AM Kam Johnston DO Physician Sign     FH1.1 03/20/17 08:19 AM Kam Johnston DO Physician     C - Copied, M - Manual, T - Template

## 2019-10-23 ENCOUNTER — APPOINTMENT (OUTPATIENT)
Dept: INTERNAL MEDICINE | Facility: CLINIC | Age: 59
End: 2019-10-23

## 2019-11-12 ENCOUNTER — APPOINTMENT (OUTPATIENT)
Dept: SURGICAL ONCOLOGY | Facility: CLINIC | Age: 59
End: 2019-11-12
Payer: COMMERCIAL

## 2019-11-12 VITALS
HEIGHT: 64 IN | WEIGHT: 140 LBS | TEMPERATURE: 98.2 F | SYSTOLIC BLOOD PRESSURE: 118 MMHG | HEART RATE: 82 BPM | OXYGEN SATURATION: 98 % | DIASTOLIC BLOOD PRESSURE: 76 MMHG | RESPIRATION RATE: 18 BRPM | BODY MASS INDEX: 23.9 KG/M2

## 2019-11-12 PROCEDURE — 99244 OFF/OP CNSLTJ NEW/EST MOD 40: CPT

## 2019-11-12 NOTE — ASSESSMENT
[FreeTextEntry1] : Imp: \par No evidence of new or recurrent lesions. \par No suspicious lesions on exam.\par  \par Plan:\par Continue yearly surveillance in November 2020. \par \par \par \par

## 2019-11-12 NOTE — PHYSICAL EXAM
[Normal] : supple, no neck mass and thyroid not enlarged [Normal Neck Lymph Nodes] : normal neck lymph nodes  [Normal Groin Lymph Nodes] : normal groin lymph nodes [Normal] : oriented to person, place and time, with appropriate affect [FreeTextEntry1] : I, Meli Chavez, was present for the physical exam.\par \par \par \par \par  [de-identified] : Normal S1, S2. Regular rate and rhythm\par \par  [de-identified] : Complete normal breast examination performed supine and upright revealed no palpable masses, nipple discharge, inversion, deviation, or enlarge axillary lymph nodes, or supraclavicular lymph nodes. [de-identified] : Clear breath sounds bilaterally, normal respiratory effort\par \par

## 2019-11-12 NOTE — REASON FOR VISIT
[Follow-Up Visit] : a follow-up visit for [Breast Cancer] : breast cancer [FreeTextEntry2] : focal ductal hyperplasia and residual DCIS

## 2019-11-12 NOTE — HISTORY OF PRESENT ILLNESS
[de-identified] : 60 y/o female presents for a f/u visit. \par \par She is s/p bilateral nipple sparing mastectomies performed on 3/30/17. \par The final pathology of the right breast was benign and showed focal ductal hyperplasia.  The left breast revealed residual DCIS.  Bilateral sentinel node biopsies were negative.  \par \par Family history of breast cancer in her mother at age 53.   \par \par Today, on 11/12/19,  the pt was w/o any complaints. Denies palpable breast masses, nipple discharge, skin changes, inversion of breast pain. Denies constitutional symptoms.

## 2019-11-20 ENCOUNTER — RX RENEWAL (OUTPATIENT)
Age: 59
End: 2019-11-20

## 2019-11-20 ENCOUNTER — APPOINTMENT (OUTPATIENT)
Dept: INTERNAL MEDICINE | Facility: CLINIC | Age: 59
End: 2019-11-20
Payer: COMMERCIAL

## 2019-11-20 ENCOUNTER — NON-APPOINTMENT (OUTPATIENT)
Age: 59
End: 2019-11-20

## 2019-11-20 VITALS
TEMPERATURE: 97.8 F | HEART RATE: 75 BPM | HEIGHT: 72 IN | OXYGEN SATURATION: 94 % | BODY MASS INDEX: 18.96 KG/M2 | WEIGHT: 140 LBS

## 2019-11-20 VITALS — DIASTOLIC BLOOD PRESSURE: 72 MMHG | SYSTOLIC BLOOD PRESSURE: 100 MMHG

## 2019-11-20 PROCEDURE — 99396 PREV VISIT EST AGE 40-64: CPT | Mod: 25

## 2019-11-20 PROCEDURE — 82270 OCCULT BLOOD FECES: CPT

## 2019-11-20 PROCEDURE — 93000 ELECTROCARDIOGRAM COMPLETE: CPT

## 2019-11-20 PROCEDURE — 36415 COLL VENOUS BLD VENIPUNCTURE: CPT

## 2019-11-20 NOTE — HISTORY OF PRESENT ILLNESS
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 She has been PATY. With this exception she has been in good health at the present time suffers with mild insomnia associated with problems with her children. She occasionally takes a quarter of a 5 mg Valium for sleep. She is physically active and denies chest pain palpitations swelling fainting or dyspnea. She has no GI symptoms and is without change in bowel melanoma or blood in her stool and is up to date on colonoscopy done by Dr. Bong Del Cid. She has no  or gynecologic problems. She refuses flu shot

## 2019-11-20 NOTE — PHYSICAL EXAM
[Normal] : normal gait, coordination grossly intact, no focal deficits [de-identified] : bilateral mastectomies with reconstruction prostheses in place/no masses [FreeTextEntry1] : Guaiac-negative no masses [de-identified] : No adenopathy [de-identified] : Thyroid is nonpalpable [de-identified] : Surgical scrub with right excision of a ball in theright upper back patient states it was nonmalignant [de-identified] : No significant lesions [de-identified] : Anxiety with her children

## 2019-11-20 NOTE — ASSESSMENT
[FreeTextEntry1] : The patient appears to be in good physical health and all screening tests will be done patient refuses flu shot Her major problem is stress with her childrenand she is occasionally using it for 5 mg Valium for sleep. EKG is within normal limits with the exception of low-voltage

## 2019-11-21 LAB
25(OH)D3 SERPL-MCNC: 35.7 NG/ML
ALBUMIN SERPL ELPH-MCNC: 4.7 G/DL
ALP BLD-CCNC: 64 U/L
ALT SERPL-CCNC: 21 U/L
ANION GAP SERPL CALC-SCNC: 13 MMOL/L
APPEARANCE: CLEAR
AST SERPL-CCNC: 21 U/L
BACTERIA: NEGATIVE
BASOPHILS # BLD AUTO: 0.05 K/UL
BASOPHILS NFR BLD AUTO: 1 %
BILIRUB SERPL-MCNC: 0.8 MG/DL
BILIRUBIN URINE: NEGATIVE
BLOOD URINE: NEGATIVE
BUN SERPL-MCNC: 27 MG/DL
CALCIUM SERPL-MCNC: 9.8 MG/DL
CHLORIDE SERPL-SCNC: 102 MMOL/L
CHOLEST SERPL-MCNC: 184 MG/DL
CHOLEST/HDLC SERPL: 2.2 RATIO
CO2 SERPL-SCNC: 25 MMOL/L
COLOR: YELLOW
CREAT SERPL-MCNC: 0.88 MG/DL
EOSINOPHIL # BLD AUTO: 0.27 K/UL
EOSINOPHIL NFR BLD AUTO: 5.3 %
GLUCOSE QUALITATIVE U: NEGATIVE
GLUCOSE SERPL-MCNC: 81 MG/DL
HCT VFR BLD CALC: 44.5 %
HDLC SERPL-MCNC: 84 MG/DL
HGB BLD-MCNC: 14.7 G/DL
HYALINE CASTS: 1 /LPF
IMM GRANULOCYTES NFR BLD AUTO: 0 %
KETONES URINE: NEGATIVE
LDLC SERPL CALC-MCNC: 92 MG/DL
LEUKOCYTE ESTERASE URINE: NEGATIVE
LYMPHOCYTES # BLD AUTO: 1.78 K/UL
LYMPHOCYTES NFR BLD AUTO: 34.7 %
MAN DIFF?: NORMAL
MCHC RBC-ENTMCNC: 29.3 PG
MCHC RBC-ENTMCNC: 33 GM/DL
MCV RBC AUTO: 88.6 FL
MICROSCOPIC-UA: NORMAL
MONOCYTES # BLD AUTO: 0.4 K/UL
MONOCYTES NFR BLD AUTO: 7.8 %
NEUTROPHILS # BLD AUTO: 2.63 K/UL
NEUTROPHILS NFR BLD AUTO: 51.2 %
NITRITE URINE: NEGATIVE
PH URINE: 6
PLATELET # BLD AUTO: 239 K/UL
POTASSIUM SERPL-SCNC: 4.5 MMOL/L
PROT SERPL-MCNC: 6.8 G/DL
PROTEIN URINE: NORMAL
RBC # BLD: 5.02 M/UL
RBC # FLD: 12.6 %
RED BLOOD CELLS URINE: 2 /HPF
SODIUM SERPL-SCNC: 140 MMOL/L
SPECIFIC GRAVITY URINE: 1.03
SQUAMOUS EPITHELIAL CELLS: 1 /HPF
TRIGL SERPL-MCNC: 42 MG/DL
UROBILINOGEN URINE: NORMAL
WBC # FLD AUTO: 5.13 K/UL
WHITE BLOOD CELLS URINE: 1 /HPF

## 2019-12-22 ENCOUNTER — RECORD ABSTRACTING (OUTPATIENT)
Age: 59
End: 2019-12-22

## 2019-12-22 DIAGNOSIS — Z72.3 LACK OF PHYSICAL EXERCISE: ICD-10-CM

## 2019-12-22 DIAGNOSIS — Z86.69 PERSONAL HISTORY OF OTHER DISEASES OF THE NERVOUS SYSTEM AND SENSE ORGANS: ICD-10-CM

## 2019-12-22 DIAGNOSIS — Z87.19 PERSONAL HISTORY OF OTHER DISEASES OF THE DIGESTIVE SYSTEM: ICD-10-CM

## 2019-12-22 DIAGNOSIS — K21.9 GASTRO-ESOPHAGEAL REFLUX DISEASE W/OUT ESOPHAGITIS: ICD-10-CM

## 2019-12-22 DIAGNOSIS — Z87.09 PERSONAL HISTORY OF OTHER DISEASES OF THE RESPIRATORY SYSTEM: ICD-10-CM

## 2019-12-22 DIAGNOSIS — J06.9 ACUTE UPPER RESPIRATORY INFECTION, UNSPECIFIED: ICD-10-CM

## 2019-12-22 DIAGNOSIS — Z86.39 PERSONAL HISTORY OF OTHER ENDOCRINE, NUTRITIONAL AND METABOLIC DISEASE: ICD-10-CM

## 2019-12-22 RX ORDER — DEXLANSOPRAZOLE 60 MG/1
CAPSULE, DELAYED RELEASE ORAL
Refills: 0 | Status: DISCONTINUED | COMMUNITY
End: 2019-12-22

## 2020-01-02 ENCOUNTER — APPOINTMENT (OUTPATIENT)
Dept: INTERNAL MEDICINE | Facility: CLINIC | Age: 60
End: 2020-01-02
Payer: COMMERCIAL

## 2020-01-02 VITALS
HEIGHT: 72 IN | BODY MASS INDEX: 18.96 KG/M2 | WEIGHT: 140 LBS | HEART RATE: 52 BPM | TEMPERATURE: 98 F | OXYGEN SATURATION: 95 %

## 2020-01-02 PROCEDURE — 99214 OFFICE O/P EST MOD 30 MIN: CPT

## 2020-01-02 NOTE — HISTORY OF PRESENT ILLNESS
[FreeTextEntry8] : Patient comes in for one month of sacroiliac pain on the right which occurred following pulling and has not resolved despite Aleve 500 mg b.i.d. and rest. She has no neurologic signs or symptoms. She has tried massage and physical therapy without help

## 2020-01-02 NOTE — PHYSICAL EXAM
[No Acute Distress] : no acute distress [Well Nourished] : well nourished [Well Developed] : well developed [Well-Appearing] : well-appearing [Normal Sclera/Conjunctiva] : normal sclera/conjunctiva [PERRL] : pupils equal round and reactive to light [EOMI] : extraocular movements intact [Normal Outer Ear/Nose] : the outer ears and nose were normal in appearance [Normal Oropharynx] : the oropharynx was normal [No JVD] : no jugular venous distention [No Lymphadenopathy] : no lymphadenopathy [Supple] : supple [Thyroid Normal, No Nodules] : the thyroid was normal and there were no nodules present [No Respiratory Distress] : no respiratory distress  [No Accessory Muscle Use] : no accessory muscle use [Clear to Auscultation] : lungs were clear to auscultation bilaterally [Normal Rate] : normal rate  [Regular Rhythm] : with a regular rhythm [Normal S1, S2] : normal S1 and S2 [No Murmur] : no murmur heard [No Carotid Bruits] : no carotid bruits [No Abdominal Bruit] : a ~M bruit was not heard ~T in the abdomen [No Varicosities] : no varicosities [Pedal Pulses Present] : the pedal pulses are present [No Palpable Aorta] : no palpable aorta [No Edema] : there was no peripheral edema [Soft] : abdomen soft [No Extremity Clubbing/Cyanosis] : no extremity clubbing/cyanosis [Non-distended] : non-distended [Non Tender] : non-tender [No Masses] : no abdominal mass palpated [No HSM] : no HSM [Normal Bowel Sounds] : normal bowel sounds [Normal Posterior Cervical Nodes] : no posterior cervical lymphadenopathy [Normal Anterior Cervical Nodes] : no anterior cervical lymphadenopathy [No CVA Tenderness] : no CVA  tenderness [No Spinal Tenderness] : no spinal tenderness [No Joint Swelling] : no joint swelling [Grossly Normal Strength/Tone] : grossly normal strength/tone [No Rash] : no rash [Coordination Grossly Intact] : coordination grossly intact [No Focal Deficits] : no focal deficits [Normal Gait] : normal gait [Deep Tendon Reflexes (DTR)] : deep tendon reflexes were 2+ and symmetric [Normal Affect] : the affect was normal [Normal Insight/Judgement] : insight and judgment were intact [de-identified] : Patient has pain in the right sacroiliac region which can only be reproduced by twisting

## 2020-01-02 NOTE — ASSESSMENT
[FreeTextEntry1] : Plan Medrol Dosepak for a week and then decide on further workup. She can continue Aleve 250 b.i.d. with meals

## 2020-01-13 ENCOUNTER — OUTPATIENT (OUTPATIENT)
Dept: OUTPATIENT SERVICES | Facility: HOSPITAL | Age: 60
LOS: 1 days | Discharge: HOME | End: 2020-01-13

## 2020-01-13 ENCOUNTER — APPOINTMENT (OUTPATIENT)
Dept: HEMATOLOGY ONCOLOGY | Facility: CLINIC | Age: 60
End: 2020-01-13
Payer: COMMERCIAL

## 2020-01-13 VITALS
TEMPERATURE: 97.5 F | OXYGEN SATURATION: 98 % | SYSTOLIC BLOOD PRESSURE: 112 MMHG | HEART RATE: 75 BPM | BODY MASS INDEX: 17.14 KG/M2 | RESPIRATION RATE: 18 BRPM | DIASTOLIC BLOOD PRESSURE: 74 MMHG | WEIGHT: 140.83 LBS

## 2020-01-13 DIAGNOSIS — M46.1 SACROILIITIS, NOT ELSEWHERE CLASSIFIED: ICD-10-CM

## 2020-01-13 DIAGNOSIS — Z98.82 BREAST IMPLANT STATUS: Chronic | ICD-10-CM

## 2020-01-13 DIAGNOSIS — D05.90 UNSPECIFIED TYPE OF CARCINOMA IN SITU OF UNSPECIFIED BREAST: ICD-10-CM

## 2020-01-13 DIAGNOSIS — Z41.1 ENCOUNTER FOR COSMETIC SURGERY: Chronic | ICD-10-CM

## 2020-01-13 DIAGNOSIS — E89.2 POSTPROCEDURAL HYPOPARATHYROIDISM: Chronic | ICD-10-CM

## 2020-01-13 DIAGNOSIS — Z90.13 ACQUIRED ABSENCE OF BILATERAL BREASTS AND NIPPLES: Chronic | ICD-10-CM

## 2020-01-13 DIAGNOSIS — Z98.89 OTHER SPECIFIED POSTPROCEDURAL STATES: Chronic | ICD-10-CM

## 2020-01-13 PROCEDURE — 99214 OFFICE O/P EST MOD 30 MIN: CPT

## 2020-01-15 ENCOUNTER — APPOINTMENT (OUTPATIENT)
Dept: INTERNAL MEDICINE | Facility: CLINIC | Age: 60
End: 2020-01-15
Payer: COMMERCIAL

## 2020-01-15 PROCEDURE — 99214 OFFICE O/P EST MOD 30 MIN: CPT

## 2020-01-15 NOTE — ASSESSMENT
[FreeTextEntry1] : The patient has persistent pain in the right sacral region especially with change in position which has not responded to Aleve or Medrol Dosepak. She has no changes on her bloods done approximately 2 weeks ago and noted abnormalities on her neurologic exam. As her workup with x-rays of the lumbosacral spine as well as the sacroiliac region. If this is unrevealing we will seek orthopedic evaluation/MRI of the area and further blood testing. Differential diagnoses include orthopedic problems, oncologic problems.

## 2020-01-15 NOTE — HISTORY OF PRESENT ILLNESS
[de-identified] : The patient is a 59-year-old female who has now had 3+ weeks of the right sacroiliac pain which is dull and aching it worse with change in position and has not been relieved with Aleve or Medrol Dosepak She denies weakness numbness loss of balance and coordination. She denies trauma but has had bilateral mastectomy for DCIS on the left without spread. She has been PATY. Recent blood tests were done and were negative. She is there had pain like this previously

## 2020-01-15 NOTE — PHYSICAL EXAM
[Well Nourished] : well nourished [No Acute Distress] : no acute distress [Well Developed] : well developed [Normal Sclera/Conjunctiva] : normal sclera/conjunctiva [Well-Appearing] : well-appearing [Normal Outer Ear/Nose] : the outer ears and nose were normal in appearance [EOMI] : extraocular movements intact [PERRL] : pupils equal round and reactive to light [No JVD] : no jugular venous distention [Normal Oropharynx] : the oropharynx was normal [Supple] : supple [Thyroid Normal, No Nodules] : the thyroid was normal and there were no nodules present [No Lymphadenopathy] : no lymphadenopathy [No Respiratory Distress] : no respiratory distress  [No Accessory Muscle Use] : no accessory muscle use [Clear to Auscultation] : lungs were clear to auscultation bilaterally [Regular Rhythm] : with a regular rhythm [Normal Rate] : normal rate  [No Carotid Bruits] : no carotid bruits [Normal S1, S2] : normal S1 and S2 [No Murmur] : no murmur heard [Pedal Pulses Present] : the pedal pulses are present [No Abdominal Bruit] : a ~M bruit was not heard ~T in the abdomen [No Varicosities] : no varicosities [No Palpable Aorta] : no palpable aorta [No Edema] : there was no peripheral edema [No Extremity Clubbing/Cyanosis] : no extremity clubbing/cyanosis [Soft] : abdomen soft [Non Tender] : non-tender [Non-distended] : non-distended [No HSM] : no HSM [No Masses] : no abdominal mass palpated [Normal Bowel Sounds] : normal bowel sounds [Normal Posterior Cervical Nodes] : no posterior cervical lymphadenopathy [Normal Anterior Cervical Nodes] : no anterior cervical lymphadenopathy [No CVA Tenderness] : no CVA  tenderness [No Joint Swelling] : no joint swelling [Grossly Normal Strength/Tone] : grossly normal strength/tone [No Rash] : no rash [Coordination Grossly Intact] : coordination grossly intact [No Focal Deficits] : no focal deficits [Deep Tendon Reflexes (DTR)] : deep tendon reflexes were 2+ and symmetric [Normal Gait] : normal gait [Normal Affect] : the affect was normal [Normal] : normal gait, coordination grossly intact, no focal deficits and deep tendon reflexes were 2+ and symmetric [de-identified] : The patient has pain in the right sacroiliac region and throughout the rightsacrum on palpation and can be reproduced by twisting and changing position [Normal Insight/Judgement] : insight and judgment were intact [de-identified] : Range of motion of the hip and knee did not reproduce the pain [de-identified] : Strength sensation reflexes are all within normal limits

## 2020-01-17 ENCOUNTER — APPOINTMENT (OUTPATIENT)
Dept: RADIOLOGY | Facility: IMAGING CENTER | Age: 60
End: 2020-01-17
Payer: COMMERCIAL

## 2020-01-17 ENCOUNTER — OUTPATIENT (OUTPATIENT)
Dept: OUTPATIENT SERVICES | Facility: HOSPITAL | Age: 60
LOS: 1 days | End: 2020-01-17
Payer: COMMERCIAL

## 2020-01-17 DIAGNOSIS — E89.2 POSTPROCEDURAL HYPOPARATHYROIDISM: Chronic | ICD-10-CM

## 2020-01-17 DIAGNOSIS — Z41.1 ENCOUNTER FOR COSMETIC SURGERY: Chronic | ICD-10-CM

## 2020-01-17 DIAGNOSIS — Z98.82 BREAST IMPLANT STATUS: Chronic | ICD-10-CM

## 2020-01-17 DIAGNOSIS — M46.1 SACROILIITIS, NOT ELSEWHERE CLASSIFIED: ICD-10-CM

## 2020-01-17 DIAGNOSIS — Z90.13 ACQUIRED ABSENCE OF BILATERAL BREASTS AND NIPPLES: Chronic | ICD-10-CM

## 2020-01-17 DIAGNOSIS — Z08 ENCOUNTER FOR FOLLOW-UP EXAMINATION AFTER COMPLETED TREATMENT FOR MALIGNANT NEOPLASM: ICD-10-CM

## 2020-01-17 DIAGNOSIS — Z98.89 OTHER SPECIFIED POSTPROCEDURAL STATES: Chronic | ICD-10-CM

## 2020-01-17 PROCEDURE — 72100 X-RAY EXAM L-S SPINE 2/3 VWS: CPT | Mod: 26

## 2020-01-17 PROCEDURE — 72220 X-RAY EXAM SACRUM TAILBONE: CPT

## 2020-01-17 PROCEDURE — 72100 X-RAY EXAM L-S SPINE 2/3 VWS: CPT

## 2020-01-17 PROCEDURE — 72220 X-RAY EXAM SACRUM TAILBONE: CPT | Mod: 26

## 2020-01-17 NOTE — PHYSICAL EXAM
[Fully active, able to carry on all pre-disease performance without restriction] : Status 0 - Fully active, able to carry on all pre-disease performance without restriction [Normal] : grossly intact [de-identified] : bl mastectomies with reconstruction. No CW or axillary nodules.

## 2020-01-17 NOTE — CONSULT LETTER
[Dear  ___] : Dear  [unfilled], [Consult Letter:] : I had the pleasure of evaluating your patient, [unfilled]. [Consult Closing:] : Thank you very much for allowing me to participate in the care of this patient.  If you have any questions, please do not hesitate to contact me. [Sincerely,] : Sincerely, [Please see my note below.] : Please see my note below. [DrSlava ___] : Dr. ROSS [DrSlava  ___] : Dr. ROSS [FreeTextEntry3] : Celestina Montes De Oca M.D.\par  of Medicine\par [facility]\par [facility] [place]\par [address] [place] [address]\par [address]\par Tele # [phone]; Fax [phone]\par

## 2020-01-17 NOTE — REASON FOR VISIT
[Follow-Up Visit] : a follow-up [Other: _____] : [unfilled] [FreeTextEntry2] : Left Breast DCIS ER/CT POSITIVE

## 2020-01-17 NOTE — HISTORY OF PRESENT ILLNESS
[7 - Distress Level] : Distress Level: 7 [de-identified] : Ms. MALISSA DONOHUE  is a 59 year old female here for an evaluation of breast cancer. Her oncologic history is as follows:\par \par She underwent routine breast imaging on 1/31/17 which showed dense breasts, new grouping of microcalcifications in the upper central left breast 4.8 cm FN. Unremarkable b/l breast ultrasound. Bx recommended.  She underwent left breast upper central core biopsy on 2/6/17 which showed  DCIS with high grade nuclear atypia and central necrosis. 3 mm in size. ER >90 % POSITIVE, MI > 90 % POSITIVE  She underwent a breast MRI on 2/14/18 which showed a 1.4 x 0.8 cm non-mass enhancement corresponding to Bx proven malignancy. A 4 mm focus of enhancement in upper central inner left breast Bx recommended. A 6 x 4 mm oval minimally enhancing mass in the lower outer right breast Right breast ultrasound with possible Bx recommended. \par \par She underwent B/L mastectomy on 3/30/17 which revealed Left breast DCIS  High grade measuring 0.2 cm margins clear. Lymphovascular invasion was absent. 1 sentinel lymph node was removed and negative for metastasis. Right breast benign fibrocystic changes with stromal fibrosis, focal ductal hyperplasia. Right axillary sentinel lymph node negative for malignancy.\par \par GYM 3 days a week  [de-identified] : Ms. MALISSA DONOHUE  is here for a follow up appt for left breast DCIS, s/p BL mastectomies in 2017.\par She reports  back pain while exercising/bowling. She took some meds but still has pain. Will d/w PCP re MRI SPINE\par Otherwise she is doing well. She is concerned about her kids issues. She sees plastics q6m\par \par \par \par

## 2020-01-17 NOTE — ASSESSMENT
[FreeTextEntry1] : \par Patient is a 59-year-old lady who was diagnosed with left breast ER/AK positive high grade DCIS in 2017. She is status post bilateral mastectomies on 3/30/2017. No invasive carcinoma or lymphovascular invasion noted. [place] lymph nodes were negative. \par  \par I reviewed the natural history and treatment options for ductal carcinoma in situ. We reviewed risk of recurrence and breast cancer prevention options for patients with DCIS. Patient underwent definitive bilateral mastectomy. She does not need radiation therapy or endocrine therapy for risk reduction. She does not need breast imaging.\par \par She is very stressed about her son’s psychiatric illness. I reassured her and advised to continue [facility] at [facility]. I counselled her extensively about the role of healthy diet, regular exercise and stress free life style. \par \par She reports h/o genetic testing and she is BRCA negative\par \par BAck pain work up with PCP\par  \par RTO PRN\par

## 2020-01-27 ENCOUNTER — APPOINTMENT (OUTPATIENT)
Dept: ORTHOPEDIC SURGERY | Facility: CLINIC | Age: 60
End: 2020-01-27
Payer: COMMERCIAL

## 2020-01-27 VITALS
BODY MASS INDEX: 23.32 KG/M2 | HEIGHT: 65 IN | SYSTOLIC BLOOD PRESSURE: 118 MMHG | HEART RATE: 86 BPM | WEIGHT: 140 LBS | DIASTOLIC BLOOD PRESSURE: 75 MMHG

## 2020-01-27 DIAGNOSIS — M41.30 THORACOGENIC SCOLIOSIS, SITE UNSPECIFIED: ICD-10-CM

## 2020-01-27 DIAGNOSIS — Z87.898 PERSONAL HISTORY OF OTHER SPECIFIED CONDITIONS: ICD-10-CM

## 2020-01-27 PROCEDURE — 72100 X-RAY EXAM L-S SPINE 2/3 VWS: CPT

## 2020-01-27 PROCEDURE — 99204 OFFICE O/P NEW MOD 45 MIN: CPT

## 2020-01-27 NOTE — HISTORY OF PRESENT ILLNESS
[de-identified] : This 59-year-old woman had an episode of back pain 15 years ago treated with an epidural but has done well for many years.  2 months ago after bowling she had the onset of lower back pain which became worse when she went on an elliptical.  Treatment has been acupuncture and massage.  She had been taking 2 Aleve twice a day for 3 to 4 weeks with significant relief.  There has been some radiation to the right anterior thigh.  She has not had associated neurologic symptoms of numbness or paresthesias and there is no Valsalva effect.  Initially she had night pain but that has resolved.  The pain can be worse sitting for more than an hour.  It is no worse standing.  She recently had a Medrol Dosepak without significant benefit.  She has had a bilateral mastectomy.  She gets occasional heartburn. [Pain Location] : pain [Improving] : improving [4] : a maximum pain level of 4/10

## 2020-01-27 NOTE — DISCUSSION/SUMMARY
[Medication Risks Reviewed] : Medication risks reviewed [de-identified] : She will continue to rest and restrict her activities.  Will use moist heat and has been started on Naprosyn 500 mg twice a day as a nonsteroidal anti-inflammatory.  She will call if there are problems with the medication or worsening of her symptoms and I will see her for follow-up in 3-1/2 weeks.

## 2020-01-27 NOTE — PHYSICAL EXAM
[de-identified] : She is fully alert and oriented with a normal mood and affect.  She is in no acute distress.  She ambulates with a normal gait including tiptoe and heel walking.  There are no cutaneous abnormalities or palpable bony defects of the spine.  There is no evidence of shortness of breath or respiratory distress.  On stance evaluation there is a mild prominence of the right hip and a mild flattening of the left waistline secondary to scoliosis.  With forward flexion of the spine there is a small left-sided paravertebral prominence.  Forward flexion of the spine shows the fingertips reaching to within 10 inches of the floor causing mild lower back pain.  Her lower extremity neurological examination revealed 1+ symmetrical reflexes with normal motor power and normal sensation.  Straight leg raising is negative to 90 degrees.  There is no paravertebral muscle spasm or trochanteric tenderness.  There is mild right-sided sciatic notch sensitivity.  Her hips and knees have a full range of motion with normal stability.  Vascular exam reveals some mild superficial varicosities.  There is no lymphedema.  There are no cutaneous abnormalities of the upper extremities or the lower extremities.  Her upper extremities are normal to inspection and her elbows have a full range of motion with normal motor power and normal stability. [de-identified] : AP and lateral x-rays of the lumbar spine reveal mild to moderate left lumbar scoliosis.  There are degenerative changes at the apex and above.  Sagittal alignment is normal and there are no destructive changes.

## 2020-01-27 NOTE — REASON FOR VISIT
[Initial Visit] : an initial visit for [Degenerative Joint Disease] : degenerative joint disease [Back Pain] : back pain [Radiculopathy] : radiculopathy [Scoliosis] : scoliosis

## 2020-02-17 ENCOUNTER — RX RENEWAL (OUTPATIENT)
Age: 60
End: 2020-02-17

## 2020-02-24 ENCOUNTER — APPOINTMENT (OUTPATIENT)
Dept: ORTHOPEDIC SURGERY | Facility: CLINIC | Age: 60
End: 2020-02-24
Payer: COMMERCIAL

## 2020-02-24 DIAGNOSIS — M41.126 ADOLESCENT IDIOPATHIC SCOLIOSIS, LUMBAR REGION: ICD-10-CM

## 2020-02-24 PROCEDURE — 99213 OFFICE O/P EST LOW 20 MIN: CPT

## 2020-02-24 NOTE — DISCUSSION/SUMMARY
[Medication Risks Reviewed] : Medication risks reviewed [de-identified] : She will finish the Naprosyn 500 mg twice a day.  She has only a few days left.  She will then take 2 Aleve twice a day for a week and if still doing well lower the dose to 1 twice a day.  I will see her for follow-up in 3 weeks.  If the symptoms worsen if she lowers the dose of medicine she will call me and we will change the medication and the date of her follow-up.

## 2020-02-24 NOTE — HISTORY OF PRESENT ILLNESS
[de-identified] : December she initially had constant mild right-sided lower back pain with radiation to the right anterior thigh.  When I saw her in January she had improved with intermittent right-sided back and right anterior thigh pain.  She is tolerated the Naprosyn 500 mg twice a day without problems and now has some intermittent discomfort in the back and rare leg symptoms. [Pain Location] : pain [Intermit.] : ~He/She~ states the symptoms seem to be intermittent [2] : a maximum pain level of 2/10 [Improving] : improving

## 2020-03-16 ENCOUNTER — APPOINTMENT (OUTPATIENT)
Dept: ORTHOPEDIC SURGERY | Facility: CLINIC | Age: 60
End: 2020-03-16

## 2020-07-23 ENCOUNTER — APPOINTMENT (OUTPATIENT)
Dept: INTERNAL MEDICINE | Facility: CLINIC | Age: 60
End: 2020-07-23

## 2020-07-23 ENCOUNTER — APPOINTMENT (OUTPATIENT)
Dept: INTERNAL MEDICINE | Facility: CLINIC | Age: 60
End: 2020-07-23
Payer: COMMERCIAL

## 2020-07-23 PROCEDURE — 90471 IMMUNIZATION ADMIN: CPT

## 2020-07-23 PROCEDURE — 90715 TDAP VACCINE 7 YRS/> IM: CPT

## 2020-07-29 ENCOUNTER — APPOINTMENT (OUTPATIENT)
Dept: INTERNAL MEDICINE | Facility: CLINIC | Age: 60
End: 2020-07-29

## 2020-11-17 ENCOUNTER — APPOINTMENT (OUTPATIENT)
Age: 60
End: 2020-11-17
Payer: COMMERCIAL

## 2020-11-17 VITALS
DIASTOLIC BLOOD PRESSURE: 76 MMHG | BODY MASS INDEX: 24.16 KG/M2 | WEIGHT: 145 LBS | HEART RATE: 90 BPM | OXYGEN SATURATION: 98 % | SYSTOLIC BLOOD PRESSURE: 136 MMHG | HEIGHT: 65 IN | RESPIRATION RATE: 18 BRPM

## 2020-11-17 PROCEDURE — 99214 OFFICE O/P EST MOD 30 MIN: CPT

## 2020-11-17 NOTE — PHYSICAL EXAM
[Normal] : supple, no neck mass and thyroid not enlarged [Normal Neck Lymph Nodes] : normal neck lymph nodes  [Normal Groin Lymph Nodes] : normal groin lymph nodes [Normal] : oriented to person, place and time, with appropriate affect [FreeTextEntry1] : \par \par \par the scribe acted as a chaperone.\par  [de-identified] : Normal S1, S2. Regular rate and rhythm\par \par  [de-identified] : Complete normal breast examination performed supine and upright revealed no palpable masses, nipple discharge, inversion, deviation, or enlarge axillary lymph nodes, or supraclavicular lymph nodes. [de-identified] : Clear breath sounds bilaterally, normal respiratory effort\par \par

## 2020-11-17 NOTE — HISTORY OF PRESENT ILLNESS
[de-identified] : 61 y/o female presents for a f/u visit. \par \par She is s/p bilateral nipple sparing mastectomies performed on 3/30/17. \par The final pathology of the right breast was benign and showed focal ductal hyperplasia.  The left breast revealed residual DCIS.  Bilateral sentinel node biopsies were negative.  \par \par Family history of breast cancer in her mother at age 53.   \par \par Today, on 11/17/20,  the pt was w/o any complaints. Denies palpable breast masses, nipple discharge, skin changes, inversion of breast pain. Denies constitutional symptoms.

## 2020-12-02 ENCOUNTER — APPOINTMENT (OUTPATIENT)
Dept: INTERNAL MEDICINE | Facility: CLINIC | Age: 60
End: 2020-12-02
Payer: COMMERCIAL

## 2020-12-02 ENCOUNTER — NON-APPOINTMENT (OUTPATIENT)
Age: 60
End: 2020-12-02

## 2020-12-02 VITALS
SYSTOLIC BLOOD PRESSURE: 120 MMHG | TEMPERATURE: 98.6 F | BODY MASS INDEX: 24.16 KG/M2 | DIASTOLIC BLOOD PRESSURE: 80 MMHG | HEIGHT: 65 IN | HEART RATE: 71 BPM | WEIGHT: 145 LBS | OXYGEN SATURATION: 97 %

## 2020-12-02 PROCEDURE — 82272 OCCULT BLD FECES 1-3 TESTS: CPT

## 2020-12-02 PROCEDURE — 99072 ADDL SUPL MATRL&STAF TM PHE: CPT

## 2020-12-02 PROCEDURE — 93000 ELECTROCARDIOGRAM COMPLETE: CPT

## 2020-12-02 PROCEDURE — 99396 PREV VISIT EST AGE 40-64: CPT | Mod: 25

## 2020-12-02 PROCEDURE — 36415 COLL VENOUS BLD VENIPUNCTURE: CPT

## 2020-12-02 RX ORDER — DIAZEPAM 5 MG/1
TABLET ORAL
Refills: 0 | Status: DISCONTINUED | COMMUNITY
End: 2020-12-02

## 2020-12-02 RX ORDER — ZOLPIDEM TARTRATE 5 MG/1
5 TABLET, FILM COATED ORAL
Refills: 0 | Status: DISCONTINUED | COMMUNITY
End: 2020-12-02

## 2020-12-02 NOTE — REVIEW OF SYSTEMS
[Negative] : Heme/Lymph [FreeTextEntry7] : uses occas dexilant [FreeTextEntry8] : see hpi [de-identified] : insomnia  -occas valium

## 2020-12-02 NOTE — HISTORY OF PRESENT ILLNESS
[de-identified] : 60 F for CPE. S/P bilat mastectomy w reconstruction, s/p R superior parathyroidectonmy ROS at present neg exc for mild stress incontinece and nocturia. Had flu shot and colonos 3 yrs ago

## 2020-12-02 NOTE — PHYSICAL EXAM
[Normal] : normal gait, coordination grossly intact, no focal deficits [de-identified] : S/P bilat mastectomy w reconstructiion [FreeTextEntry1] : guaiac neg w/o mass [de-identified] : neg [de-identified] : neg [de-identified] : surical scar R upper back for mole ? benign

## 2020-12-03 LAB
25(OH)D3 SERPL-MCNC: 44.4 NG/ML
ALBUMIN SERPL ELPH-MCNC: 4.5 G/DL
ALP BLD-CCNC: 68 U/L
ALT SERPL-CCNC: 21 U/L
ANION GAP SERPL CALC-SCNC: 11 MMOL/L
APPEARANCE: CLEAR
AST SERPL-CCNC: 23 U/L
BACTERIA: NEGATIVE
BASOPHILS # BLD AUTO: 0.05 K/UL
BASOPHILS NFR BLD AUTO: 1 %
BILIRUB SERPL-MCNC: 0.9 MG/DL
BILIRUBIN URINE: NEGATIVE
BLOOD URINE: NEGATIVE
BUN SERPL-MCNC: 23 MG/DL
CALCIUM SERPL-MCNC: 9.7 MG/DL
CHLORIDE SERPL-SCNC: 103 MMOL/L
CHOLEST SERPL-MCNC: 189 MG/DL
CO2 SERPL-SCNC: 25 MMOL/L
COLOR: YELLOW
CREAT SERPL-MCNC: 0.87 MG/DL
CRP SERPL HS-MCNC: 0.67 MG/L
EOSINOPHIL # BLD AUTO: 0.14 K/UL
EOSINOPHIL NFR BLD AUTO: 2.8 %
GLUCOSE QUALITATIVE U: NEGATIVE
GLUCOSE SERPL-MCNC: 92 MG/DL
HCT VFR BLD CALC: 44.4 %
HDLC SERPL-MCNC: 85 MG/DL
HGB BLD-MCNC: 14.4 G/DL
HYALINE CASTS: 1 /LPF
IMM GRANULOCYTES NFR BLD AUTO: 0.2 %
KETONES URINE: NEGATIVE
LDLC SERPL CALC-MCNC: 96 MG/DL
LEUKOCYTE ESTERASE URINE: NEGATIVE
LYMPHOCYTES # BLD AUTO: 1.66 K/UL
LYMPHOCYTES NFR BLD AUTO: 33.6 %
MAN DIFF?: NORMAL
MCHC RBC-ENTMCNC: 29.8 PG
MCHC RBC-ENTMCNC: 32.4 GM/DL
MCV RBC AUTO: 91.9 FL
MICROSCOPIC-UA: NORMAL
MONOCYTES # BLD AUTO: 0.39 K/UL
MONOCYTES NFR BLD AUTO: 7.9 %
NEUTROPHILS # BLD AUTO: 2.69 K/UL
NEUTROPHILS NFR BLD AUTO: 54.5 %
NITRITE URINE: NEGATIVE
NONHDLC SERPL-MCNC: 104 MG/DL
PH URINE: 6.5
PLATELET # BLD AUTO: 238 K/UL
POTASSIUM SERPL-SCNC: 4.3 MMOL/L
PROT SERPL-MCNC: 6.3 G/DL
PROTEIN URINE: NORMAL
RBC # BLD: 4.83 M/UL
RBC # FLD: 12.8 %
RED BLOOD CELLS URINE: 2 /HPF
SODIUM SERPL-SCNC: 139 MMOL/L
SPECIFIC GRAVITY URINE: 1.02
SQUAMOUS EPITHELIAL CELLS: 3 /HPF
TRIGL SERPL-MCNC: 44 MG/DL
TSH SERPL-ACNC: 1.93 UIU/ML
UROBILINOGEN URINE: NORMAL
WBC # FLD AUTO: 4.94 K/UL
WHITE BLOOD CELLS URINE: 2 /HPF

## 2020-12-09 ENCOUNTER — FORM ENCOUNTER (OUTPATIENT)
Age: 60
End: 2020-12-09

## 2020-12-10 ENCOUNTER — APPOINTMENT (OUTPATIENT)
Dept: OBGYN | Facility: CLINIC | Age: 60
End: 2020-12-10
Payer: COMMERCIAL

## 2020-12-10 ENCOUNTER — FORM ENCOUNTER (OUTPATIENT)
Age: 60
End: 2020-12-10

## 2020-12-10 PROCEDURE — 99396 PREV VISIT EST AGE 40-64: CPT

## 2020-12-10 PROCEDURE — 99072 ADDL SUPL MATRL&STAF TM PHE: CPT

## 2020-12-14 ENCOUNTER — FORM ENCOUNTER (OUTPATIENT)
Age: 60
End: 2020-12-14

## 2020-12-20 ENCOUNTER — FORM ENCOUNTER (OUTPATIENT)
Age: 60
End: 2020-12-20

## 2021-02-03 ENCOUNTER — OUTPATIENT (OUTPATIENT)
Dept: OUTPATIENT SERVICES | Facility: HOSPITAL | Age: 61
LOS: 1 days | Discharge: HOME | End: 2021-02-03

## 2021-02-03 DIAGNOSIS — Z90.13 ACQUIRED ABSENCE OF BILATERAL BREASTS AND NIPPLES: Chronic | ICD-10-CM

## 2021-02-03 DIAGNOSIS — E89.2 POSTPROCEDURAL HYPOPARATHYROIDISM: Chronic | ICD-10-CM

## 2021-02-03 DIAGNOSIS — D05.90 UNSPECIFIED TYPE OF CARCINOMA IN SITU OF UNSPECIFIED BREAST: ICD-10-CM

## 2021-02-03 DIAGNOSIS — Z41.1 ENCOUNTER FOR COSMETIC SURGERY: Chronic | ICD-10-CM

## 2021-02-03 DIAGNOSIS — Z98.82 BREAST IMPLANT STATUS: Chronic | ICD-10-CM

## 2021-02-03 DIAGNOSIS — Z98.89 OTHER SPECIFIED POSTPROCEDURAL STATES: Chronic | ICD-10-CM

## 2021-02-08 ENCOUNTER — APPOINTMENT (OUTPATIENT)
Dept: HEMATOLOGY ONCOLOGY | Facility: CLINIC | Age: 61
End: 2021-02-08
Payer: COMMERCIAL

## 2021-02-08 DIAGNOSIS — Z13.71 ENCOUNTER FOR NONPROCREATIVE SCREENING FOR GENETIC DISEASE CARRIER STATUS: ICD-10-CM

## 2021-02-08 PROCEDURE — 99213 OFFICE O/P EST LOW 20 MIN: CPT | Mod: 95

## 2021-02-08 NOTE — ASSESSMENT
[FreeTextEntry1] : \par Patient is a 59-year-old lady who was diagnosed with left breast ER/[place] positive high grade DCIS in 2017. She is status post bilateral mastectomies on 3/30/2017. No invasive carcinoma or lymphovascular invasion noted. [place] lymph nodes were negative. BRCA negative\par \par \par I reviewed the natural history and treatment options for ductal carcinoma in situ. We reviewed risk of recurrence and breast cancer prevention options for patients with DCIS. Patient underwent definitive bilateral mastectomy. She does not need radiation therapy or endocrine therapy for risk reduction. She does not need breast imaging.\par \par reports no new concerning sx\par Healthy life style reviewed with her\par age appropriate cancer screening reviewed\par BW reviewed\par  \par RTO PRN\par

## 2021-02-08 NOTE — PHYSICAL EXAM
[Fully active, able to carry on all pre-disease performance without restriction] : Status 0 - Fully active, able to carry on all pre-disease performance without restriction [Normal] : affect appropriate [de-identified] : Constitutional: well developed, well nourished, in no acute distress. \par Eyes: no icterus seen. no conjunctival injection\par ENT: no tongue swelling, no nasal discharge\par Neck: no visible masses or goitre \par Pulmonary: no audible wheeze,  respirations unlabored\par Musculoskeletal: full range of motion, walking in the house\par Skin: no rash visible on face or upper chest [de-identified] : bl mastectomies with reconstruction. No CW or axillary nodules.

## 2021-02-08 NOTE — REASON FOR VISIT
[Home] : at home, [unfilled] , at the time of the visit. [Medical Office: ([facility])___] : at the medical office located in  [Verbal consent obtained from patient] : the patient, [unfilled] [Follow-Up Visit] : a follow-up [Other: _____] : [unfilled] [FreeTextEntry2] : Left Breast DCIS ER/PA POSITIVE

## 2021-02-08 NOTE — CONSULT LETTER
[Dear  ___] : Dear  [unfilled], [Consult Letter:] : I had the pleasure of evaluating your patient, [unfilled]. [Please see my note below.] : Please see my note below. [Consult Closing:] : Thank you very much for allowing me to participate in the care of this patient.  If you have any questions, please do not hesitate to contact me. [Sincerely,] : Sincerely, [FreeTextEntry3] : Celestina Montes De Oca M.D.\par  of Medicine\par [facility]\par [facility] [place]\par [address] [place] [address]\par [address]\par Tele # [phone]; Fax [phone]\par  [DrSlava  ___] : Dr. ROSS [DrSlava ___] : Dr. ROSS

## 2021-02-08 NOTE — HISTORY OF PRESENT ILLNESS
[de-identified] : Ms. MALISSA DONOHUE  is a 59 year old female here for an evaluation of breast cancer. Her oncologic history is as follows:\par \par She underwent routine breast imaging on 1/31/17 which showed dense breasts, new grouping of microcalcifications in the upper central left breast 4.8 cm FN. Unremarkable b/l breast ultrasound. Bx recommended.  She underwent left breast upper central core biopsy on 2/6/17 which showed  DCIS with high grade nuclear atypia and central necrosis. 3 mm in size. ER >90 % POSITIVE, AR > 90 % POSITIVE  She underwent a breast MRI on 2/14/18 which showed a 1.4 x 0.8 cm non-mass enhancement corresponding to Bx proven malignancy. A 4 mm focus of enhancement in upper central inner left breast Bx recommended. A 6 x 4 mm oval minimally enhancing mass in the lower outer right breast Right breast ultrasound with possible Bx recommended. \par \par She underwent B/L mastectomy on 3/30/17 which revealed Left breast DCIS  High grade measuring 0.2 cm margins clear. Lymphovascular invasion was absent. 1 sentinel lymph node was removed and negative for metastasis. Right breast benign fibrocystic changes with stromal fibrosis, focal ductal hyperplasia. Right axillary sentinel lymph node negative for malignancy.\par \par GYM 3 days a week  [de-identified] : Ms. MALISSA DONOHUE  is here for a follow up appt for left breast DCIS, s/p BL mastectomies in 2017.\par She reports  back pain last visit, now resolved. She has mild post mastectomy pain ( related to cold weather) \par Otherwise she is doing well. She is concerned about her kids issues. She sees plastics q6m. Breast exam with Dr Talley 11/2020\par BW 12/2020 reviewed from PCP office\par \par \par \par  [7 - Distress Level] : Distress Level: 7

## 2021-07-29 ENCOUNTER — APPOINTMENT (OUTPATIENT)
Dept: INTERNAL MEDICINE | Facility: CLINIC | Age: 61
End: 2021-07-29
Payer: COMMERCIAL

## 2021-07-29 VITALS
SYSTOLIC BLOOD PRESSURE: 120 MMHG | BODY MASS INDEX: 23.66 KG/M2 | HEIGHT: 65 IN | WEIGHT: 142 LBS | TEMPERATURE: 97.9 F | OXYGEN SATURATION: 98 % | HEART RATE: 87 BPM | DIASTOLIC BLOOD PRESSURE: 64 MMHG
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
## 2021-07-29 DIAGNOSIS — Z86.000 PERSONAL HISTORY OF IN-SITU NEOPLASM OF BREAST: ICD-10-CM

## 2021-07-29 DIAGNOSIS — S40.862A INSECT BITE (NONVENOMOUS) OF LEFT UPPER ARM, INITIAL ENCOUNTER: ICD-10-CM

## 2021-07-29 PROCEDURE — 99213 OFFICE O/P EST LOW 20 MIN: CPT

## 2021-07-29 NOTE — ASSESSMENT
[FreeTextEntry1] : The patient initially had a bite in the left axilla which was treated with sulfa and it appears that she had a sulfa reaction.  She is off the medication now for approximately 1+ weeks and seems to be back to herself.  There is no evidence of Lyme or focalized infection at this point.  We are getting bloods CBC and chemistries except

## 2021-07-29 NOTE — PHYSICAL EXAM
[No Acute Distress] : no acute distress [Well Nourished] : well nourished [Well Developed] : well developed [Well-Appearing] : well-appearing [Normal Sclera/Conjunctiva] : normal sclera/conjunctiva [PERRL] : pupils equal round and reactive to light [EOMI] : extraocular movements intact [Normal Outer Ear/Nose] : the outer ears and nose were normal in appearance [Normal Oropharynx] : the oropharynx was normal [No JVD] : no jugular venous distention [No Lymphadenopathy] : no lymphadenopathy [Supple] : supple [Thyroid Normal, No Nodules] : the thyroid was normal and there were no nodules present [No Respiratory Distress] : no respiratory distress  [No Accessory Muscle Use] : no accessory muscle use [Clear to Auscultation] : lungs were clear to auscultation bilaterally [Normal Rate] : normal rate  [Regular Rhythm] : with a regular rhythm [Normal S1, S2] : normal S1 and S2 [No Murmur] : no murmur heard [No Carotid Bruits] : no carotid bruits [No Abdominal Bruit] : a ~M bruit was not heard ~T in the abdomen [No Varicosities] : no varicosities [Pedal Pulses Present] : the pedal pulses are present [No Edema] : there was no peripheral edema [No Palpable Aorta] : no palpable aorta [No Extremity Clubbing/Cyanosis] : no extremity clubbing/cyanosis [Soft] : abdomen soft [Non Tender] : non-tender [Non-distended] : non-distended [No Masses] : no abdominal mass palpated [No HSM] : no HSM [Normal Bowel Sounds] : normal bowel sounds [Normal Posterior Cervical Nodes] : no posterior cervical lymphadenopathy [Normal Anterior Cervical Nodes] : no anterior cervical lymphadenopathy [Normal] : no posterior cervical lymphadenopathy and no anterior cervical lymphadenopathy [No CVA Tenderness] : no CVA  tenderness [No Spinal Tenderness] : no spinal tenderness [No Joint Swelling] : no joint swelling [Grossly Normal Strength/Tone] : grossly normal strength/tone [No Rash] : no rash [Coordination Grossly Intact] : coordination grossly intact [No Focal Deficits] : no focal deficits [Normal Gait] : normal gait [Deep Tendon Reflexes (DTR)] : deep tendon reflexes were 2+ and symmetric [Normal Affect] : the affect was normal [Normal Insight/Judgement] : insight and judgment were intact [de-identified] : The patient has been feeling tight in the left axilla and healing papular rash down the left bicep

## 2021-07-29 NOTE — HISTORY OF PRESENT ILLNESS
[FreeTextEntry8] : Patient is a 61-year-old female who comes in following a bite to the left axilla 2 weeks ago.  She was particularly concerned because she has had a staph infection following bilateral mastectomies.  She was seen by her surgeon and placed on sulfa which she took for 5 days but started to feel very badly with nausea and aches and then developed a papular rash going down the left arm under the bite.  She stopped the antibiotic on her own and is slowly started to feel better without evidence of infection or fever

## 2021-07-30 LAB
ALBUMIN SERPL ELPH-MCNC: 4.5 G/DL
ALP BLD-CCNC: 72 U/L
ALT SERPL-CCNC: 23 U/L
ANION GAP SERPL CALC-SCNC: 12 MMOL/L
AST SERPL-CCNC: 22 U/L
BASOPHILS # BLD AUTO: 0.05 K/UL
BASOPHILS NFR BLD AUTO: 0.9 %
BILIRUB SERPL-MCNC: 0.8 MG/DL
BUN SERPL-MCNC: 32 MG/DL
CALCIUM SERPL-MCNC: 10 MG/DL
CHLORIDE SERPL-SCNC: 102 MMOL/L
CO2 SERPL-SCNC: 24 MMOL/L
CREAT SERPL-MCNC: 0.92 MG/DL
EOSINOPHIL # BLD AUTO: 0.17 K/UL
EOSINOPHIL NFR BLD AUTO: 3 %
ERYTHROCYTE [SEDIMENTATION RATE] IN BLOOD BY WESTERGREN METHOD: 6 MM/HR
GLUCOSE SERPL-MCNC: 95 MG/DL
HCT VFR BLD CALC: 43 %
HGB BLD-MCNC: 14.4 G/DL
IMM GRANULOCYTES NFR BLD AUTO: 0.2 %
LYMPHOCYTES # BLD AUTO: 1.8 K/UL
LYMPHOCYTES NFR BLD AUTO: 31.8 %
MAN DIFF?: NORMAL
MCHC RBC-ENTMCNC: 29.6 PG
MCHC RBC-ENTMCNC: 33.5 GM/DL
MCV RBC AUTO: 88.3 FL
MONOCYTES # BLD AUTO: 0.37 K/UL
MONOCYTES NFR BLD AUTO: 6.5 %
NEUTROPHILS # BLD AUTO: 3.26 K/UL
NEUTROPHILS NFR BLD AUTO: 57.6 %
PLATELET # BLD AUTO: 253 K/UL
POTASSIUM SERPL-SCNC: 4 MMOL/L
PROT SERPL-MCNC: 6.6 G/DL
RBC # BLD: 4.87 M/UL
RBC # FLD: 12.8 %
SODIUM SERPL-SCNC: 138 MMOL/L
WBC # FLD AUTO: 5.66 K/UL

## 2021-09-30 NOTE — H&P PST ADULT - OCCUPATION
Administrations This Visit     cyanocobalamin injection 1,000 mcg     Admin Date  09/30/2021  10:17 Action  Given Dose  1,000 mcg Route  IntraMUSCular Site  Deltoid Right Administered By  Neo Ortega    Ordering Provider: Wicho Robertson MD    NDC: 3913-6110-29    Lot#: [id number]    : [employer]    Patient Supplied?: No            Patient tolerated well
book keeper

## 2021-10-13 ENCOUNTER — NON-APPOINTMENT (OUTPATIENT)
Age: 61
End: 2021-10-13

## 2021-10-13 ENCOUNTER — APPOINTMENT (OUTPATIENT)
Dept: INTERNAL MEDICINE | Facility: CLINIC | Age: 61
End: 2021-10-13
Payer: COMMERCIAL

## 2021-10-13 VITALS
HEIGHT: 65 IN | OXYGEN SATURATION: 98 % | TEMPERATURE: 97.8 F | HEART RATE: 83 BPM | WEIGHT: 144 LBS | SYSTOLIC BLOOD PRESSURE: 110 MMHG | DIASTOLIC BLOOD PRESSURE: 60 MMHG | BODY MASS INDEX: 23.99 KG/M2

## 2021-10-13 DIAGNOSIS — G47.00 INSOMNIA, UNSPECIFIED: ICD-10-CM

## 2021-10-13 DIAGNOSIS — Z86.39 PERSONAL HISTORY OF OTHER ENDOCRINE, NUTRITIONAL AND METABOLIC DISEASE: ICD-10-CM

## 2021-10-13 PROCEDURE — 99396 PREV VISIT EST AGE 40-64: CPT | Mod: 25

## 2021-10-13 PROCEDURE — 93000 ELECTROCARDIOGRAM COMPLETE: CPT

## 2021-10-13 PROCEDURE — 36415 COLL VENOUS BLD VENIPUNCTURE: CPT

## 2021-10-13 NOTE — PHYSICAL EXAM
[Normal Female:] : bladder was normal on palpation [Normal] : normal gait, coordination grossly intact, no focal deficits [de-identified] : Lateral implants without evidence of mass [FreeTextEntry1] : Negative no masses [de-identified] : No adenopathy [de-identified] : Thyroid is not palpable [de-identified] : Anterior left axillary granuloma secondary to bite [de-identified] : Anxious

## 2021-10-13 NOTE — ASSESSMENT
[FreeTextEntry1] : Patient is given a trial of zolpidem 5 mg for sleep.  She is advised on Kegel exercises.  She is advised not to use vaginal estrogen cream secondary to her breast cancer.  With the above exception she is otherwise in good health and we are doing all screening tests including urine bloods and EKG which is normal except for low voltage in the limb leads which is unchanged from previous cardiograms

## 2021-10-13 NOTE — HISTORY OF PRESENT ILLNESS
[de-identified] : Patient is a 61-year-old female who comes in for complete physical examination.  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  She is PATY.  Her major problem is insomnia and stress at home taking care of her son's dog but otherwise she has felt well.  She denies chest pain palpitations swelling fainting or dyspnea and is active walking dogs twice a day.  She has no nausea vomiting pain in the belly blood in her bowel or black bowel.  She is up-to-date on colonoscopy.  She has no blood in her urine burning of urine or difficulty passing urine but does have vaginal dryness and dyspareunia.  She is up-to-date on all vaccinations and has no focalizing neurologic signs or symptoms

## 2021-10-14 LAB
25(OH)D3 SERPL-MCNC: 47.3 NG/ML
ALBUMIN SERPL ELPH-MCNC: 4.5 G/DL
ALP BLD-CCNC: 72 U/L
ALT SERPL-CCNC: 18 U/L
ANION GAP SERPL CALC-SCNC: 12 MMOL/L
APPEARANCE: CLEAR
AST SERPL-CCNC: 22 U/L
BACTERIA: NEGATIVE
BASOPHILS # BLD AUTO: 0.05 K/UL
BASOPHILS NFR BLD AUTO: 0.9 %
BILIRUB SERPL-MCNC: 0.8 MG/DL
BILIRUBIN URINE: NEGATIVE
BLOOD URINE: NEGATIVE
BUN SERPL-MCNC: 27 MG/DL
CALCIUM SERPL-MCNC: 9.8 MG/DL
CHLORIDE SERPL-SCNC: 104 MMOL/L
CHOLEST SERPL-MCNC: 194 MG/DL
CO2 SERPL-SCNC: 24 MMOL/L
COLOR: YELLOW
CREAT SERPL-MCNC: 0.98 MG/DL
CRP SERPL HS-MCNC: 0.88 MG/L
EOSINOPHIL # BLD AUTO: 0.17 K/UL
EOSINOPHIL NFR BLD AUTO: 3.1 %
GLUCOSE QUALITATIVE U: NEGATIVE
GLUCOSE SERPL-MCNC: 83 MG/DL
HCT VFR BLD CALC: 44.1 %
HDLC SERPL-MCNC: 87 MG/DL
HGB BLD-MCNC: 14.5 G/DL
HYALINE CASTS: 4 /LPF
IMM GRANULOCYTES NFR BLD AUTO: 0.4 %
KETONES URINE: NEGATIVE
LDLC SERPL CALC-MCNC: 98 MG/DL
LEUKOCYTE ESTERASE URINE: NEGATIVE
LYMPHOCYTES # BLD AUTO: 1.81 K/UL
LYMPHOCYTES NFR BLD AUTO: 32.9 %
MAN DIFF?: NORMAL
MCHC RBC-ENTMCNC: 29.8 PG
MCHC RBC-ENTMCNC: 32.9 GM/DL
MCV RBC AUTO: 90.7 FL
MICROSCOPIC-UA: NORMAL
MONOCYTES # BLD AUTO: 0.42 K/UL
MONOCYTES NFR BLD AUTO: 7.6 %
NEUTROPHILS # BLD AUTO: 3.03 K/UL
NEUTROPHILS NFR BLD AUTO: 55.1 %
NITRITE URINE: NEGATIVE
NONHDLC SERPL-MCNC: 107 MG/DL
PH URINE: 6
PLATELET # BLD AUTO: 239 K/UL
POTASSIUM SERPL-SCNC: 4.8 MMOL/L
PROT SERPL-MCNC: 6.8 G/DL
PROTEIN URINE: NORMAL
RBC # BLD: 4.86 M/UL
RBC # FLD: 12.7 %
RED BLOOD CELLS URINE: 5 /HPF
SODIUM SERPL-SCNC: 140 MMOL/L
SPECIFIC GRAVITY URINE: 1.03
SQUAMOUS EPITHELIAL CELLS: 3 /HPF
TRIGL SERPL-MCNC: 47 MG/DL
TSH SERPL-ACNC: 2.11 UIU/ML
UROBILINOGEN URINE: NORMAL
WBC # FLD AUTO: 5.5 K/UL
WHITE BLOOD CELLS URINE: 2 /HPF

## 2021-11-23 ENCOUNTER — APPOINTMENT (OUTPATIENT)
Dept: SURGICAL ONCOLOGY | Facility: CLINIC | Age: 61
End: 2021-11-23
Payer: COMMERCIAL

## 2021-12-06 ENCOUNTER — APPOINTMENT (OUTPATIENT)
Dept: INTERNAL MEDICINE | Facility: CLINIC | Age: 61
End: 2021-12-06

## 2022-01-18 ENCOUNTER — APPOINTMENT (OUTPATIENT)
Dept: SURGICAL ONCOLOGY | Facility: CLINIC | Age: 62
End: 2022-01-18
Payer: COMMERCIAL

## 2022-01-18 VITALS
SYSTOLIC BLOOD PRESSURE: 112 MMHG | OXYGEN SATURATION: 94 % | HEART RATE: 76 BPM | HEIGHT: 65 IN | BODY MASS INDEX: 23.99 KG/M2 | RESPIRATION RATE: 17 BRPM | DIASTOLIC BLOOD PRESSURE: 74 MMHG | TEMPERATURE: 97.9 F | WEIGHT: 144 LBS

## 2022-01-18 PROCEDURE — 99214 OFFICE O/P EST MOD 30 MIN: CPT

## 2022-01-18 NOTE — HISTORY OF PRESENT ILLNESS
[de-identified] : 60 y/o female presents for a f/u visit. \par \par She is s/p bilateral nipple sparing mastectomies performed on 3/30/17. \par The final pathology of the right breast was benign and showed focal ductal hyperplasia.  The left breast revealed residual DCIS.  Bilateral sentinel node biopsies were negative.   Implant reconstruction. \par \par Family history of breast cancer in her mother at age 53.   \par \par Denies palpable chest wall masses or pain. \par

## 2022-01-18 NOTE — ASSESSMENT
[FreeTextEntry1] : Imp: \par Hx DCIS s/p bilateral mastectomies with implant reconstruction 2017\par No evidence of new or recurrent lesions. \par No suspicious lesions on exam.\par  \par Plan:\par Continue yearly breast surveillance.\par RTO in 1 yr\par \par \par \par

## 2022-01-18 NOTE — PHYSICAL EXAM
[Normal] : supple, no neck mass and thyroid not enlarged [Normal Supraclavicular Lymph Nodes] : normal supraclavicular lymph nodes [Normal Axillary Lymph Nodes] : normal axillary lymph nodes [Normal] : oriented to person, place and time, with appropriate affect [FreeTextEntry1] : BEN present during exam \par  [de-identified] : Normal S1, S2. Regular rate and rhythm\par \par  [de-identified] : s/p bilateral mastectomies with implant reconstruction ; no palpable chest wall masses  [de-identified] : Clear breath sounds bilaterally, normal respiratory effort\par \par

## 2022-02-09 ENCOUNTER — NON-APPOINTMENT (OUTPATIENT)
Age: 62
End: 2022-02-09

## 2022-02-09 DIAGNOSIS — Z78.9 OTHER SPECIFIED HEALTH STATUS: ICD-10-CM

## 2022-02-09 DIAGNOSIS — Z92.89 PERSONAL HISTORY OF OTHER MEDICAL TREATMENT: ICD-10-CM

## 2022-02-09 DIAGNOSIS — Z98.890 OTHER SPECIFIED POSTPROCEDURAL STATES: ICD-10-CM

## 2022-02-09 DIAGNOSIS — O02.1 MISSED ABORTION: ICD-10-CM

## 2022-03-15 ENCOUNTER — APPOINTMENT (OUTPATIENT)
Dept: OBGYN | Facility: CLINIC | Age: 62
End: 2022-03-15
Payer: COMMERCIAL

## 2022-03-15 VITALS
SYSTOLIC BLOOD PRESSURE: 110 MMHG | BODY MASS INDEX: 23.82 KG/M2 | HEIGHT: 65 IN | DIASTOLIC BLOOD PRESSURE: 62 MMHG | WEIGHT: 143 LBS

## 2022-03-15 DIAGNOSIS — Z01.419 ENCOUNTER FOR GYNECOLOGICAL EXAMINATION (GENERAL) (ROUTINE) W/OUT ABNORMAL FINDINGS: ICD-10-CM

## 2022-03-15 DIAGNOSIS — N76.0 ACUTE VAGINITIS: ICD-10-CM

## 2022-03-15 PROCEDURE — 99396 PREV VISIT EST AGE 40-64: CPT

## 2022-03-15 NOTE — HISTORY OF PRESENT ILLNESS
[TextBox_4] : 60yo postmen. female with Hx of breast cancer and double mastectomy 5years ago presents for routine gyn exam. No complaints. 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 \par \par \par Info. from prior EMR:\par Demographics:  Race: White Ethnicity: Not  or  Native Language: English Occupation: own business/ (having issues with 2 of the triplets who are not 28years old Mental disabilities possibly schizophrenia )  \par : 2  Para:  1 0 1 3 LMP: 2011 Menopause: Age: 52\par OB History:  X 1(TRIPLETS), MAB X 1  \par GYN \par  Breast cancer Sexually Active  \par PMH\par  No significant past medical history. Breast Cancer, negative hereditary cancer screening \par Accepts blood products \par Surgical History: c/s x 1, Bilateral mastectomy  with reconstruction \par Allergies: pcn\par Current Medications: Prescribed/Suppliments/OTC NONE \par Medications Verified Medications Verified \par Last PAP: 2016 -- pap neg hpv neg portal message sent dl12/15/16 Last Mammo: 2017 - Additional views requested by Radiologist Last Breast Sono:: - benign br sono \par Family Hx\par  No significant family history Breast Cancer: Breast age 53- \par Fam HX comments: Father -  age 38 - unknown cancer, Mother - Melanoma age 60, Brother -melanoma \par  Personal history of blood clots/DVT/PE: no  \par  Personal history of conditions causing increased risk of blood clots/DVT/PE: no  \par  Family history of blood clots/DVT/PE: no  \par  Family history of conditions causing increased risk of blood clots/DVT/PE: no\par  [PapSmeardate] : 12/2020 [TextBox_31] : wnl

## 2022-03-15 NOTE — PLAN
[FreeTextEntry1] : HCM\par -SBE\par -pap & HPV\par -MVI, Calcium, Vit d\par -Weight/exercise\par Breast cancer Hx (double mastectomy)\par -follows with Filardi\par RTO 1 year\par

## 2022-03-20 LAB
CANDIDA VAG CYTO: NOT DETECTED
G VAGINALIS+PREV SP MTYP VAG QL MICRO: NOT DETECTED
HPV HIGH+LOW RISK DNA PNL CVX: NOT DETECTED
T VAGINALIS VAG QL WET PREP: NOT DETECTED

## 2022-03-21 LAB — CYTOLOGY CVX/VAG DOC THIN PREP: ABNORMAL

## 2022-07-29 NOTE — ASU PREOP CHECKLIST - NEEDLE GAUGE
You can access the FollowMyHealth Patient Portal offered by [facility] by registering at the following website: [email or web address]. By joining [employer]’s FollowMyHealth portal, you will also be able to view your health information using other applications (apps) compatible with our system.
20

## 2022-12-02 ENCOUNTER — NON-APPOINTMENT (OUTPATIENT)
Age: 62
End: 2022-12-02

## 2022-12-02 ENCOUNTER — APPOINTMENT (OUTPATIENT)
Dept: INTERNAL MEDICINE | Facility: CLINIC | Age: 62
End: 2022-12-02

## 2022-12-02 VITALS
SYSTOLIC BLOOD PRESSURE: 120 MMHG | HEART RATE: 72 BPM | OXYGEN SATURATION: 98 % | TEMPERATURE: 97.9 F | BODY MASS INDEX: 23.66 KG/M2 | WEIGHT: 142 LBS | HEIGHT: 65 IN | DIASTOLIC BLOOD PRESSURE: 80 MMHG

## 2022-12-02 DIAGNOSIS — Z23 ENCOUNTER FOR IMMUNIZATION: ICD-10-CM

## 2022-12-02 PROCEDURE — 36415 COLL VENOUS BLD VENIPUNCTURE: CPT

## 2022-12-02 PROCEDURE — 99072 ADDL SUPL MATRL&STAF TM PHE: CPT

## 2022-12-02 PROCEDURE — 93000 ELECTROCARDIOGRAM COMPLETE: CPT

## 2022-12-02 PROCEDURE — 82272 OCCULT BLD FECES 1-3 TESTS: CPT

## 2022-12-02 PROCEDURE — 99396 PREV VISIT EST AGE 40-64: CPT | Mod: 25

## 2022-12-02 NOTE — ASSESSMENT
[FreeTextEntry1] : The patient appears to be in good health this time.  She is up-to-date on all immunizations and we are checking urine EKG which shows low voltage in the limb leads and is otherwise within normal limits and unchanged and bloods including a TSH and lipids.  She is encouraged to increase her exercise and watch her weight

## 2022-12-02 NOTE — HISTORY OF PRESENT ILLNESS
[de-identified] : Patient is a 62-year-old female who is basically been in good health with the exception of breast CA status post bilateral reconstruction and ED comes in for complete physical examination.  She is active walking her dog and has no chest pain palpitations swelling fainting or dyspnea.  He has no GI symptoms at this time although in the past she has been treated for GERD.  She denies anorexia weight loss blood in her bowel or black bowel but feels that her single muscle is somewhat weak although she has not been incontinent.  She will do Kegel exercises.  She has no blood in the urine burning of urine or difficulty passing urine and is up-to-date on gynecologic exam and sees her breast doctor.  She is updated on overall vaccinations.  At the present time she is feeling well.  She sees Dr. Bong Del Cid for colonoscopy

## 2022-12-02 NOTE — HEALTH RISK ASSESSMENT
[0] : 1) Little interest or pleasure doing things: Not at all (0) [1] : 2) Feeling down, depressed, or hopeless for several days (1) [PHQ-2 Negative - No further assessment needed] : PHQ-2 Negative - No further assessment needed [[id number]] : 1

## 2022-12-02 NOTE — PHYSICAL EXAM
[Normal Female:] : bladder was normal on palpation [Normal] : normal gait, coordination grossly intact, no focal deficits [de-identified] : Bilateral reconstructions [FreeTextEntry1] : Guaiac negative no masses [de-identified] : No adenopathy [de-identified] : Thyroid is not palpable [de-identified] : No significant lesions

## 2022-12-03 LAB
ALBUMIN SERPL ELPH-MCNC: 4.2 G/DL
ALP BLD-CCNC: 77 U/L
ALT SERPL-CCNC: 25 U/L
ANION GAP SERPL CALC-SCNC: 13 MMOL/L
APPEARANCE: CLEAR
AST SERPL-CCNC: 26 U/L
BACTERIA: NEGATIVE
BASOPHILS # BLD AUTO: 0.06 K/UL
BASOPHILS NFR BLD AUTO: 1.2 %
BILIRUB SERPL-MCNC: 0.8 MG/DL
BILIRUBIN URINE: NEGATIVE
BLOOD URINE: NEGATIVE
BUN SERPL-MCNC: 26 MG/DL
CALCIUM SERPL-MCNC: 9.5 MG/DL
CHLORIDE SERPL-SCNC: 103 MMOL/L
CHOLEST SERPL-MCNC: 194 MG/DL
CO2 SERPL-SCNC: 24 MMOL/L
COLOR: YELLOW
CREAT SERPL-MCNC: 0.89 MG/DL
CRP SERPL HS-MCNC: 0.26 MG/L
EGFR: 73 ML/MIN/1.73M2
EOSINOPHIL # BLD AUTO: 0.23 K/UL
EOSINOPHIL NFR BLD AUTO: 4.4 %
GLUCOSE QUALITATIVE U: NEGATIVE
GLUCOSE SERPL-MCNC: 82 MG/DL
HCT VFR BLD CALC: 44.3 %
HDLC SERPL-MCNC: 79 MG/DL
HGB BLD-MCNC: 14.4 G/DL
HYALINE CASTS: 5 /LPF
IMM GRANULOCYTES NFR BLD AUTO: 0.2 %
KETONES URINE: NEGATIVE
LDLC SERPL CALC-MCNC: 107 MG/DL
LEUKOCYTE ESTERASE URINE: ABNORMAL
LYMPHOCYTES # BLD AUTO: 1.5 K/UL
LYMPHOCYTES NFR BLD AUTO: 28.9 %
MAN DIFF?: NORMAL
MCHC RBC-ENTMCNC: 29 PG
MCHC RBC-ENTMCNC: 32.5 GM/DL
MCV RBC AUTO: 89.1 FL
MICROSCOPIC-UA: NORMAL
MONOCYTES # BLD AUTO: 0.42 K/UL
MONOCYTES NFR BLD AUTO: 8.1 %
NEUTROPHILS # BLD AUTO: 2.97 K/UL
NEUTROPHILS NFR BLD AUTO: 57.2 %
NITRITE URINE: NEGATIVE
NONHDLC SERPL-MCNC: 116 MG/DL
PH URINE: 6.5
PLATELET # BLD AUTO: 232 K/UL
POTASSIUM SERPL-SCNC: 4 MMOL/L
PROT SERPL-MCNC: 6.7 G/DL
PROTEIN URINE: NORMAL
RBC # BLD: 4.97 M/UL
RBC # FLD: 13 %
RED BLOOD CELLS URINE: 4 /HPF
SODIUM SERPL-SCNC: 141 MMOL/L
SPECIFIC GRAVITY URINE: 1.02
SQUAMOUS EPITHELIAL CELLS: 3 /HPF
TRIGL SERPL-MCNC: 43 MG/DL
TSH SERPL-ACNC: 1.66 UIU/ML
UROBILINOGEN URINE: NORMAL
WBC # FLD AUTO: 5.19 K/UL
WHITE BLOOD CELLS URINE: 6 /HPF

## 2023-01-24 ENCOUNTER — APPOINTMENT (OUTPATIENT)
Dept: SURGICAL ONCOLOGY | Facility: CLINIC | Age: 63
End: 2023-01-24
Payer: COMMERCIAL

## 2023-01-24 VITALS
SYSTOLIC BLOOD PRESSURE: 128 MMHG | DIASTOLIC BLOOD PRESSURE: 70 MMHG | BODY MASS INDEX: 23.82 KG/M2 | HEIGHT: 65 IN | HEART RATE: 81 BPM | RESPIRATION RATE: 16 BRPM | WEIGHT: 143 LBS | OXYGEN SATURATION: 98 %

## 2023-01-24 DIAGNOSIS — Z85.3 ENCOUNTER FOR FOLLOW-UP EXAMINATION AFTER COMPLETED TREATMENT FOR MALIGNANT NEOPLASM: ICD-10-CM

## 2023-01-24 DIAGNOSIS — Z08 ENCOUNTER FOR FOLLOW-UP EXAMINATION AFTER COMPLETED TREATMENT FOR MALIGNANT NEOPLASM: ICD-10-CM

## 2023-01-24 PROCEDURE — 99213 OFFICE O/P EST LOW 20 MIN: CPT

## 2023-01-24 NOTE — HISTORY OF PRESENT ILLNESS
[de-identified] : Ms. MALISSA DONOHUE is a 62 year old woman here today for a follow-up visit \par \par She is s/p bilateral nipple sparing mastectomies performed on 3/30/17. \par The final pathology of the right breast was benign and showed focal ductal hyperplasia.  The left breast revealed residual DCIS.  Bilateral sentinel node biopsies were negative.  Implant reconstruction. \par \par Family history of breast cancer in her mother at age 53.   \par \par Denies palpable chest wall masses or pain. \par

## 2023-01-24 NOTE — ASSESSMENT
[FreeTextEntry1] : Imp: \par Hx DCIS s/p bilateral mastectomies with implant reconstruction 2017\par No evidence of new or recurrent lesions. \par No suspicious lesions on exam.\par  \par Plan:\par Continue yearly breast surveillance.\par RTO in 1 yr\par \par All medical entries were at my, Dr. Nilay Talley, direction.  I have reviewed the chart and agree that the record accurately reflects my personal performance of the history, physical exam, assessment, and plan.  Our office nurse practitioner was present for the duration of the office visit.

## 2023-01-24 NOTE — PHYSICAL EXAM
[FreeTextEntry1] : SC present during exam \par  [de-identified] : Normal S1, S2. Regular rate and rhythm\par \par  [de-identified] : s/p bilateral mastectomies with implant reconstruction ; no palpable chest wall masses  [de-identified] : Clear breath sounds bilaterally, normal respiratory effort\par \par

## 2023-12-05 ENCOUNTER — APPOINTMENT (OUTPATIENT)
Dept: INTERNAL MEDICINE | Facility: CLINIC | Age: 63
End: 2023-12-05
Payer: COMMERCIAL

## 2023-12-05 ENCOUNTER — NON-APPOINTMENT (OUTPATIENT)
Age: 63
End: 2023-12-05

## 2023-12-05 ENCOUNTER — APPOINTMENT (OUTPATIENT)
Dept: INTERNAL MEDICINE | Facility: CLINIC | Age: 63
End: 2023-12-05

## 2023-12-05 VITALS
HEIGHT: 65 IN | BODY MASS INDEX: 23.82 KG/M2 | SYSTOLIC BLOOD PRESSURE: 113 MMHG | DIASTOLIC BLOOD PRESSURE: 70 MMHG | TEMPERATURE: 97.6 F | OXYGEN SATURATION: 97 % | HEART RATE: 70 BPM | WEIGHT: 143 LBS

## 2023-12-05 DIAGNOSIS — Z00.00 ENCOUNTER FOR GENERAL ADULT MEDICAL EXAMINATION W/OUT ABNORMAL FINDINGS: ICD-10-CM

## 2023-12-05 PROCEDURE — 99396 PREV VISIT EST AGE 40-64: CPT | Mod: 25

## 2023-12-05 PROCEDURE — 36415 COLL VENOUS BLD VENIPUNCTURE: CPT

## 2023-12-05 RX ORDER — NAPROXEN 500 MG/1
500 TABLET ORAL
Qty: 60 | Refills: 0 | Status: DISCONTINUED | COMMUNITY
Start: 2020-01-27 | End: 2023-12-05

## 2023-12-05 RX ORDER — NAPROXEN SODIUM 220 MG
220 TABLET ORAL
Refills: 0 | Status: DISCONTINUED | COMMUNITY
End: 2023-12-05

## 2023-12-05 RX ORDER — DEXLANSOPRAZOLE 60 MG/1
60 CAPSULE, DELAYED RELEASE ORAL
Qty: 90 | Refills: 0 | Status: DISCONTINUED | COMMUNITY
Start: 2016-09-01 | End: 2023-12-05

## 2023-12-05 RX ORDER — DIAZEPAM 5 MG/1
5 TABLET ORAL
Qty: 30 | Refills: 0 | Status: DISCONTINUED | COMMUNITY
Start: 2019-11-20 | End: 2023-12-05

## 2023-12-05 RX ORDER — ZOLPIDEM TARTRATE 5 MG/1
5 TABLET ORAL
Qty: 30 | Refills: 5 | Status: DISCONTINUED | COMMUNITY
Start: 2021-10-13 | End: 2023-12-05

## 2023-12-07 ENCOUNTER — NON-APPOINTMENT (OUTPATIENT)
Age: 63
End: 2023-12-07

## 2023-12-07 LAB
ALBUMIN SERPL ELPH-MCNC: 4.6 G/DL
ALP BLD-CCNC: 70 U/L
ALT SERPL-CCNC: 21 U/L
ANION GAP SERPL CALC-SCNC: 13 MMOL/L
AST SERPL-CCNC: 23 U/L
BILIRUB SERPL-MCNC: 0.7 MG/DL
BUN SERPL-MCNC: 24 MG/DL
CALCIUM SERPL-MCNC: 10.1 MG/DL
CHLORIDE SERPL-SCNC: 103 MMOL/L
CHOLEST SERPL-MCNC: 193 MG/DL
CO2 SERPL-SCNC: 24 MMOL/L
CREAT SERPL-MCNC: 0.8 MG/DL
EGFR: 83 ML/MIN/1.73M2
ESTIMATED AVERAGE GLUCOSE: 111 MG/DL
GLUCOSE SERPL-MCNC: 91 MG/DL
HBA1C MFR BLD HPLC: 5.5 %
HCT VFR BLD CALC: 43.6 %
HDLC SERPL-MCNC: 87 MG/DL
HGB BLD-MCNC: 14.6 G/DL
LDLC SERPL CALC-MCNC: 96 MG/DL
MCHC RBC-ENTMCNC: 29.4 PG
MCHC RBC-ENTMCNC: 33.5 GM/DL
MCV RBC AUTO: 87.7 FL
NONHDLC SERPL-MCNC: 105 MG/DL
PLATELET # BLD AUTO: 265 K/UL
POTASSIUM SERPL-SCNC: 4.1 MMOL/L
PROT SERPL-MCNC: 7 G/DL
RBC # BLD: 4.97 M/UL
RBC # FLD: 13 %
SODIUM SERPL-SCNC: 140 MMOL/L
TRIGL SERPL-MCNC: 49 MG/DL
TSH SERPL-ACNC: 1.29 UIU/ML
WBC # FLD AUTO: 6.04 K/UL

## 2024-01-11 ENCOUNTER — APPOINTMENT (OUTPATIENT)
Dept: INFECTIOUS DISEASE | Facility: CLINIC | Age: 64
End: 2024-01-11
Payer: SELF-PAY

## 2024-01-11 DIAGNOSIS — Z71.84 ENC FOR HEALTH COUNSELING RELATED TO TRAVEL: ICD-10-CM

## 2024-01-11 PROCEDURE — 99401 PREV MED CNSL INDIV APPRX 15: CPT

## 2024-01-30 ENCOUNTER — APPOINTMENT (OUTPATIENT)
Dept: INFECTIOUS DISEASE | Facility: CLINIC | Age: 64
End: 2024-01-30

## 2024-03-05 ENCOUNTER — APPOINTMENT (OUTPATIENT)
Dept: SURGICAL ONCOLOGY | Facility: CLINIC | Age: 64
End: 2024-03-05
Payer: COMMERCIAL

## 2024-03-05 VITALS
BODY MASS INDEX: 23.82 KG/M2 | HEIGHT: 65 IN | SYSTOLIC BLOOD PRESSURE: 116 MMHG | OXYGEN SATURATION: 95 % | WEIGHT: 143 LBS | DIASTOLIC BLOOD PRESSURE: 72 MMHG | RESPIRATION RATE: 17 BRPM | HEART RATE: 93 BPM

## 2024-03-05 DIAGNOSIS — D05.12 INTRADUCTAL CARCINOMA IN SITU OF LEFT BREAST: ICD-10-CM

## 2024-03-05 PROCEDURE — 99213 OFFICE O/P EST LOW 20 MIN: CPT

## 2024-03-05 NOTE — PHYSICAL EXAM
[FreeTextEntry1] : GS present during exam   [de-identified] : Normal S1, S2. Regular rate and rhythm\par  \par   [de-identified] : s/p bilateral mastectomies with implant reconstruction ; no palpable chest wall masses  [de-identified] : Clear breath sounds bilaterally, normal respiratory effort\par  \par

## 2024-03-05 NOTE — HISTORY OF PRESENT ILLNESS
[de-identified] : Ms. MALISSA DONOHUE is a 63 year old woman here today for a follow-up visit   She is s/p bilateral nipple sparing mastectomies performed on 3/30/17.  The final pathology of the right breast was benign and showed focal ductal hyperplasia.  The left breast revealed residual DCIS.  Bilateral sentinel node biopsies were negative.  Implant reconstruction.   Family history of breast cancer in her mother at age 53.     Denies palpable chest wall masses or pain.

## 2024-03-28 ENCOUNTER — APPOINTMENT (OUTPATIENT)
Dept: INTERNAL MEDICINE | Facility: CLINIC | Age: 64
End: 2024-03-28
Payer: COMMERCIAL

## 2024-03-28 DIAGNOSIS — M54.9 DORSALGIA, UNSPECIFIED: ICD-10-CM

## 2024-03-28 PROCEDURE — 99213 OFFICE O/P EST LOW 20 MIN: CPT

## 2024-03-29 ENCOUNTER — APPOINTMENT (OUTPATIENT)
Dept: ORTHOPEDIC SURGERY | Facility: CLINIC | Age: 64
End: 2024-03-29
Payer: COMMERCIAL

## 2024-03-29 VITALS
WEIGHT: 143 LBS | SYSTOLIC BLOOD PRESSURE: 126 MMHG | DIASTOLIC BLOOD PRESSURE: 70 MMHG | HEIGHT: 65 IN | BODY MASS INDEX: 23.82 KG/M2 | HEART RATE: 76 BPM

## 2024-03-29 DIAGNOSIS — G89.29 LUMBAGO WITH SCIATICA, RIGHT SIDE: ICD-10-CM

## 2024-03-29 DIAGNOSIS — M47.816 SPONDYLOSIS W/OUT MYELOPATHY OR RADICULOPATHY, LUMBAR REGION: ICD-10-CM

## 2024-03-29 DIAGNOSIS — M54.41 LUMBAGO WITH SCIATICA, RIGHT SIDE: ICD-10-CM

## 2024-03-29 PROCEDURE — 99204 OFFICE O/P NEW MOD 45 MIN: CPT

## 2024-03-29 PROCEDURE — 72110 X-RAY EXAM L-2 SPINE 4/>VWS: CPT

## 2024-03-29 NOTE — PHYSICAL EXAM
[de-identified] : Lumbar Physical Exam   Gait - Normal  Station - Normal   Sagittal balance - Normal   Compensatory mechanism? - None       Reflexes    Patellar - normal    Gastroc - normal    Clonus - No    Hip Exam - Normal   Straight leg raise - none   Pulses - 2+ dp/pt   Range of motion - normal    Sensation   Sensation intact to light touch in L1, L2, L3, L4, L5 and S1 dermatomes bilaterally     Motor             IP Quad HS TA Gastroc EHL   Right 5/5  5/5   5/5 5/5    5/5     5/5       Left   5/5  5/5   5/5 5/5    5/5      5/5  [de-identified] : Lumbar Rads 4 views  degenerative scoliosis  L4-5 spondylolisthesis w motion on flex ex

## 2024-03-29 NOTE — HISTORY OF PRESENT ILLNESS
[de-identified] : Patient is a 63 year old female who presents for initial eval 5 days of lower bp. Patient states sxs began after moving luggage following a trip. Taking Aleve and using lidocaine patches with pain.  Denies any bowel/bladder incontinence. Denies any saddle anesthesia. Denies significant radiating LE sxs.  Patient reports bruising in second toe.

## 2024-03-29 NOTE — ADDENDUM
[FreeTextEntry1] :  I, Ivelisse Márquez, acted solely as a scribe for Dr. Dalton Gresham MD on this date 03/29/2024   All medical record entries made by the Scribe were at my, Dr. Dalton Gresham MD., direction and personally dictated by me on 03/29/2024. I have reviewed the chart and agree that the record accurately reflects my personal performance of the history, physical exam, assessment and plan. I have also personally directed, reviewed, and agreed with the chart.

## 2024-03-29 NOTE — ASSESSMENT
[FreeTextEntry1] : This is a 63 year female with an acute episode of lower back pain. I had a lengthy discussion with the patient in regard to their treatment plan and diagnosis. Their symptoms have persisted despite the conservative management they have attempted thus far. At this time, the patient will proceed with a lumbar MRI. In tandem with this they should begin a physical therapy/home therapy program. The patient can take High dose prednisone taper, Tizanidine, Tylenol/NSAIDs as needed for pain control if medically able to. I will have the patient follow-up in 2 weeks for repeat clinical evaluation, and to review their MRI results. I encouraged them to follow-up sooner if their symptoms worsen or change in any way. Encouraged patient to follow up with podiatrist for evaluation and treatment of feet symptoms.

## 2024-04-01 RX ORDER — AZITHROMYCIN 250 MG/1
250 TABLET, FILM COATED ORAL
Qty: 4 | Refills: 0 | Status: DISCONTINUED | COMMUNITY
Start: 2024-01-11 | End: 2024-04-01

## 2024-04-01 RX ORDER — ATOVAQUONE AND PROGUANIL HYDROCHLORIDE 250; 100 MG/1; MG/1
250-100 TABLET, FILM COATED ORAL DAILY
Qty: 12 | Refills: 0 | Status: DISCONTINUED | COMMUNITY
Start: 2024-01-11 | End: 2024-04-01

## 2024-04-01 NOTE — REVIEW OF SYSTEMS
[Back Pain] : back pain [Fever] : no fever [Fatigue] : no fatigue [Chills] : no chills [Vision Problems] : no vision problems [Chest Pain] : no chest pain [Palpitations] : no palpitations [Lower Ext Edema] : no lower extremity edema [Shortness Of Breath] : no shortness of breath [Wheezing] : no wheezing [Cough] : no cough [Dyspnea on Exertion] : no dyspnea on exertion [Abdominal Pain] : no abdominal pain [Nausea] : no nausea [Diarrhea] : diarrhea [Joint Pain] : no joint pain [Vomiting] : no vomiting [Muscle Weakness] : no muscle weakness [Muscle Pain] : no muscle pain [Headache] : no headache [Skin Rash] : no skin rash [Dizziness] : no dizziness [Fainting] : no fainting [Unsteady Walking] : no ataxia

## 2024-04-01 NOTE — HISTORY OF PRESENT ILLNESS
[Medical Office: ([facility])___] : at the medical office located in  [Other Location: e.g. School (Enter Location, City,State)___] : at [unfilled], at the time of the visit. [Verbal consent obtained from patient] : the patient, [unfilled] [FreeTextEntry1] : follow up [de-identified] : 62 yo F pmh DCIS left breast s/p b/l mastectomy and reconstruction , insomnia, presents for follow up Reports 4 days prior, patient reports waking up with pain in lower back region. Felt like "bones were breaking." Pain was present for 2 hours and subsided on own. Similar symptoms occurred the following day however this time, patient applied lidocaine patch to area and took 3-4 tabs of alleve 200 mg. Patient went to chiropractor yesterday and had manipulation performed. Last night, patient put lidocaine patch on back overnight, however woke up this morning with same pain.  This morning, pain subsided after 1 hour. States she was unable to walk due to pain. Describes pain as sharp, radiates down leg and mid back, rated 10/10. Patient states pain is not present throughout the day, denies trauma, denies urinary symptoms or loss of bowel or bladder control, or numbness/tingling, weakness.  Patient states she was traveling 1 week prior and walked for an extended period of time carrying luggage.

## 2024-04-01 NOTE — PLAN
[FreeTextEntry1] : Low back pain - likely 2/2 lifting luggage 1 week prior - medrol dose pack sent to pharmacy - MR lumbar spine ordered - advised if any worsening symptoms increasing weakness, loss of bowel or bladder control, she is to go to ED for immediate eval.  - ortho spine referral given

## 2024-04-05 ENCOUNTER — APPOINTMENT (OUTPATIENT)
Dept: MRI IMAGING | Facility: CLINIC | Age: 64
End: 2024-04-05
Payer: COMMERCIAL

## 2024-04-05 PROCEDURE — A9585: CPT

## 2024-04-05 PROCEDURE — 72158 MRI LUMBAR SPINE W/O & W/DYE: CPT

## 2024-04-16 DIAGNOSIS — K76.89 OTHER SPECIFIED DISEASES OF LIVER: ICD-10-CM

## 2024-04-16 RX ORDER — TIZANIDINE 2 MG/1
2 TABLET ORAL
Qty: 30 | Refills: 1 | Status: DISCONTINUED | COMMUNITY
Start: 2024-03-29 | End: 2024-04-16

## 2024-04-16 RX ORDER — PREDNISONE 10 MG/1
10 TABLET ORAL
Qty: 39 | Refills: 0 | Status: DISCONTINUED | COMMUNITY
Start: 2024-03-29 | End: 2024-04-16

## 2024-04-16 RX ORDER — TIZANIDINE 2 MG/1
2 TABLET ORAL EVERY 6 HOURS
Qty: 56 | Refills: 1 | Status: DISCONTINUED | COMMUNITY
Start: 2024-03-29 | End: 2024-04-16

## 2024-04-16 RX ORDER — METHYLPREDNISOLONE 4 MG/1
4 TABLET ORAL
Qty: 1 | Refills: 0 | Status: DISCONTINUED | COMMUNITY
Start: 2020-01-02 | End: 2024-04-16

## 2024-09-26 ENCOUNTER — APPOINTMENT (OUTPATIENT)
Dept: OBGYN | Facility: CLINIC | Age: 64
End: 2024-09-26

## 2024-09-26 VITALS
BODY MASS INDEX: 23.82 KG/M2 | HEART RATE: 80 BPM | DIASTOLIC BLOOD PRESSURE: 66 MMHG | SYSTOLIC BLOOD PRESSURE: 113 MMHG | WEIGHT: 143 LBS | HEIGHT: 65 IN

## 2024-09-26 DIAGNOSIS — Z11.51 ENCOUNTER FOR SCREENING FOR HUMAN PAPILLOMAVIRUS (HPV): ICD-10-CM

## 2024-09-26 PROCEDURE — 99386 PREV VISIT NEW AGE 40-64: CPT

## 2024-09-27 LAB — HPV HIGH+LOW RISK DNA PNL CVX: NOT DETECTED

## 2024-10-02 NOTE — PHYSICAL EXAM
[Chaperone Present] : A chaperone was present in the examining room during all aspects of the physical examination [Appropriately responsive] : appropriately responsive [Alert] : alert [No Acute Distress] : no acute distress [No Lymphadenopathy] : no lymphadenopathy [Soft] : soft [Non-tender] : non-tender [Non-distended] : non-distended [No HSM] : No HSM [No Lesions] : no lesions [No Mass] : no mass [Oriented x3] : oriented x3 [Examination Of The Breasts] : a normal appearance [Right Breast Absent] : a total mastectomy [Breast Reconstruction Right] : breast reconstruction [Left Breast Absent] : a total mastectomy [Breast Reconstruction Left] : breast reconstruction [No Masses] : no breast masses were palpable [Labia Majora] : normal [Labia Minora] : normal [Normal] : normal [Uterine Adnexae] : normal [FreeTextEntry2] : Brittany

## 2024-10-02 NOTE — END OF VISIT
[FreeTextEntry3] : I Rito Shaffer, acted as a scribe on behalf of Dr. Andre Moore on 09/26/2024.  All medical entries made by this scribe where at my Dr. Andre Moore, direction and personally dictated by me on 09/26/2024.  I have reviewed the chart and agree that the record accurately reflects my personal performance of the history, physical exam, assessment, and plan. I have also personally directed, reviewed and agreed with the chart.

## 2024-10-02 NOTE — PLAN
[FreeTextEntry1] : 63 yo for annual visit, s/p bilateral mastectomy and reconstruction  HCM -pap smear today -colonoscopy up to date  Atrophic vaginitis -referred to Gabriela Diaz

## 2024-10-02 NOTE — PLAN
[FreeTextEntry1] : 65 yo for annual visit, s/p bilateral mastectomy and reconstruction  HCM -pap smear today -colonoscopy up to date  Atrophic vaginitis -referred to Gabriela Diaz

## 2024-10-03 LAB — CYTOLOGY CVX/VAG DOC THIN PREP: ABNORMAL

## 2025-01-11 ENCOUNTER — EMERGENCY (EMERGENCY)
Facility: HOSPITAL | Age: 65
LOS: 1 days | Discharge: HOME | End: 2025-01-11
Attending: EMERGENCY MEDICINE | Admitting: EMERGENCY MEDICINE
Payer: SELF-PAY

## 2025-01-11 VITALS
SYSTOLIC BLOOD PRESSURE: 124 MMHG | OXYGEN SATURATION: 100 % | TEMPERATURE: 98 F | HEART RATE: 90 BPM | RESPIRATION RATE: 18 BRPM | WEIGHT: 130.07 LBS | DIASTOLIC BLOOD PRESSURE: 65 MMHG

## 2025-01-11 DIAGNOSIS — Z90.13 ACQUIRED ABSENCE OF BILATERAL BREASTS AND NIPPLES: Chronic | ICD-10-CM

## 2025-01-11 DIAGNOSIS — Z98.82 BREAST IMPLANT STATUS: Chronic | ICD-10-CM

## 2025-01-11 DIAGNOSIS — E89.2 POSTPROCEDURAL HYPOPARATHYROIDISM: Chronic | ICD-10-CM

## 2025-01-11 DIAGNOSIS — Z41.1 ENCOUNTER FOR COSMETIC SURGERY: Chronic | ICD-10-CM

## 2025-01-11 DIAGNOSIS — Z98.89 OTHER SPECIFIED POSTPROCEDURAL STATES: Chronic | ICD-10-CM

## 2025-01-11 PROCEDURE — 99284 EMERGENCY DEPT VISIT MOD MDM: CPT

## 2025-01-11 PROCEDURE — 99053 MED SERV 10PM-8AM 24 HR FAC: CPT

## 2025-01-11 NOTE — ED ADULT TRIAGE NOTE - CHIEF COMPLAINT QUOTE
Pt st " I have severe rt lower pelvic pain and diarreah this pain came on suddenly. " Pt st " I have severe rt lower pelvic pain and diarreah this pain came on suddenly. " Unable to obtain temp in triage. Pt appears uncomfortable. doubled over

## 2025-01-12 LAB
ALBUMIN SERPL ELPH-MCNC: 4.2 G/DL — SIGNIFICANT CHANGE UP (ref 3.3–5)
ALP SERPL-CCNC: 77 U/L — SIGNIFICANT CHANGE UP (ref 40–120)
ALT FLD-CCNC: 26 U/L — SIGNIFICANT CHANGE UP (ref 4–33)
ANION GAP SERPL CALC-SCNC: 14 MMOL/L — SIGNIFICANT CHANGE UP (ref 7–14)
AST SERPL-CCNC: 38 U/L — HIGH (ref 4–32)
BASOPHILS # BLD AUTO: 0.02 K/UL — SIGNIFICANT CHANGE UP (ref 0–0.2)
BASOPHILS NFR BLD AUTO: 0.3 % — SIGNIFICANT CHANGE UP (ref 0–2)
BILIRUB SERPL-MCNC: 0.4 MG/DL — SIGNIFICANT CHANGE UP (ref 0.2–1.2)
BUN SERPL-MCNC: 27 MG/DL — HIGH (ref 7–23)
CALCIUM SERPL-MCNC: 9.3 MG/DL — SIGNIFICANT CHANGE UP (ref 8.4–10.5)
CHLORIDE SERPL-SCNC: 100 MMOL/L — SIGNIFICANT CHANGE UP (ref 98–107)
CO2 SERPL-SCNC: 21 MMOL/L — LOW (ref 22–31)
CREAT SERPL-MCNC: 0.81 MG/DL — SIGNIFICANT CHANGE UP (ref 0.5–1.3)
EGFR: 81 ML/MIN/1.73M2 — SIGNIFICANT CHANGE UP
EOSINOPHIL # BLD AUTO: 0.01 K/UL — SIGNIFICANT CHANGE UP (ref 0–0.5)
EOSINOPHIL NFR BLD AUTO: 0.1 % — SIGNIFICANT CHANGE UP (ref 0–6)
GAS PNL BLDV: SIGNIFICANT CHANGE UP
GLUCOSE SERPL-MCNC: 115 MG/DL — HIGH (ref 70–99)
HCT VFR BLD CALC: 42.2 % — SIGNIFICANT CHANGE UP (ref 34.5–45)
HGB BLD-MCNC: 14.4 G/DL — SIGNIFICANT CHANGE UP (ref 11.5–15.5)
IANC: 5.77 K/UL — SIGNIFICANT CHANGE UP (ref 1.8–7.4)
IMM GRANULOCYTES NFR BLD AUTO: 0.3 % — SIGNIFICANT CHANGE UP (ref 0–0.9)
LYMPHOCYTES # BLD AUTO: 1.27 K/UL — SIGNIFICANT CHANGE UP (ref 1–3.3)
LYMPHOCYTES # BLD AUTO: 16.9 % — SIGNIFICANT CHANGE UP (ref 13–44)
MCHC RBC-ENTMCNC: 29.4 PG — SIGNIFICANT CHANGE UP (ref 27–34)
MCHC RBC-ENTMCNC: 34.1 G/DL — SIGNIFICANT CHANGE UP (ref 32–36)
MCV RBC AUTO: 86.1 FL — SIGNIFICANT CHANGE UP (ref 80–100)
MONOCYTES # BLD AUTO: 0.42 K/UL — SIGNIFICANT CHANGE UP (ref 0–0.9)
MONOCYTES NFR BLD AUTO: 5.6 % — SIGNIFICANT CHANGE UP (ref 2–14)
NEUTROPHILS # BLD AUTO: 5.77 K/UL — SIGNIFICANT CHANGE UP (ref 1.8–7.4)
NEUTROPHILS NFR BLD AUTO: 76.8 % — SIGNIFICANT CHANGE UP (ref 43–77)
NRBC # BLD: 0 /100 WBCS — SIGNIFICANT CHANGE UP (ref 0–0)
NRBC # FLD: 0 K/UL — SIGNIFICANT CHANGE UP (ref 0–0)
PLATELET # BLD AUTO: 231 K/UL — SIGNIFICANT CHANGE UP (ref 150–400)
POTASSIUM SERPL-MCNC: 4.5 MMOL/L — SIGNIFICANT CHANGE UP (ref 3.5–5.3)
POTASSIUM SERPL-SCNC: 4.5 MMOL/L — SIGNIFICANT CHANGE UP (ref 3.5–5.3)
PROT SERPL-MCNC: 6.2 G/DL — SIGNIFICANT CHANGE UP (ref 6–8.3)
RBC # BLD: 4.9 M/UL — SIGNIFICANT CHANGE UP (ref 3.8–5.2)
RBC # FLD: 12.8 % — SIGNIFICANT CHANGE UP (ref 10.3–14.5)
SODIUM SERPL-SCNC: 135 MMOL/L — SIGNIFICANT CHANGE UP (ref 135–145)
WBC # BLD: 7.51 K/UL — SIGNIFICANT CHANGE UP (ref 3.8–10.5)
WBC # FLD AUTO: 7.51 K/UL — SIGNIFICANT CHANGE UP (ref 3.8–10.5)

## 2025-01-12 PROCEDURE — 76830 TRANSVAGINAL US NON-OB: CPT | Mod: 26

## 2025-01-12 NOTE — ED PROVIDER NOTE - NSICDXFAMILYHX_GEN_ALL_CORE_FT
FAMILY HISTORY:  Father  Still living? No  Family history of lung cancer, Age at diagnosis: Age Unknown    Mother  Still living? No  Family history of breast cancer in mother, Age at diagnosis: Age Unknown  Family history of malignant melanoma of skin, Age at diagnosis: Age Unknown

## 2025-01-12 NOTE — ED PROVIDER NOTE - OBJECTIVE STATEMENT
64-year-old female past medical history only of a remote DCIS status post mastectomy presenting with an episode of extremely severe right-sided pelvic pain.  Patient states that the pain was sharp,  10 out of 10, resolved immediately prior to this area.  Patient denies associated nausea diarrhea dysuria chest pain shortness of breath vaginal bleeding or discharge.

## 2025-01-12 NOTE — ED PROVIDER NOTE - PROGRESS NOTE DETAILS
rd-   Patient reassessed, still denying any residual pain.  Results of ultrasound discussed with patient.  Patient states that she will make an appointment to see your OB/GYN as soon as possible and was given very strict return precautions regarding possible intermittent torsion.

## 2025-01-12 NOTE — ED PROVIDER NOTE - NSICDXPASTMEDICALHX_GEN_ALL_CORE_FT
PAST MEDICAL HISTORY:  Ductal carcinoma in situ (DCIS) of left breast 2017    GERD (gastroesophageal reflux disease)     Hyperparathyroidism     Spontaneous

## 2025-01-12 NOTE — ED PROVIDER NOTE - PHYSICAL EXAMINATION
PHYSICAL EXAM:  CONSTITUTIONAL: Well appearing, awake, alert, oriented to person, place, time/situation and in no apparent distress.  HEAD: Atraumatic  CARDIAC: Normal rate, regular rhythm. +S1/S2. No murmurs, rubs or gallops.  RESPIRATORY: Breathing unlabored. Breath sounds clear and equal bilaterally.  ABDOMEN: Soft, nontender, nondistended. No rebound tenderness or guarding.  NEUROLOGICAL: Alert and oriented, no focal deficits, no motor or sensory deficits. CN2-12 intact. Sensation intact x4 extremities.  SKIN: Skin warm and dry. No evidence of rashes or lesions.

## 2025-01-12 NOTE — ED PROVIDER NOTE - NSICDXPASTSURGICALHX_GEN_ALL_CORE_FT
PAST SURGICAL HISTORY:  H/O bilateral mastectomy     H/O breast biopsy pt. can't recall which breast-2005- negative  Biopsy 2017 - left - DCIS    H/O breast reconstruction     H/O rhinoplasty     History of parathyroidectomy 10/2014    Liveborn by  c x 1 - Triplets

## 2025-01-12 NOTE — ED PROVIDER NOTE - CLINICAL SUMMARY MEDICAL DECISION MAKING FREE TEXT BOX
Demetrice: Tonight, severe R pelvic pain. Totally improved PTA. Check US for torsion. If unremarkable, then dc.

## 2025-01-12 NOTE — ED PROVIDER NOTE - PATIENT PORTAL LINK FT
You can access the FollowMyHealth Patient Portal offered by [facility] by registering at the following website: [email or web address]. By joining [employer]’s FollowMyHealth portal, you will also be able to view your health information using other applications (apps) compatible with our system.

## 2025-01-12 NOTE — ED PROVIDER NOTE - ATTENDING CONTRIBUTION TO CARE
I performed a face-to-face evaluation of the patient and performed a history and physical examination. I agree with the history and physical examination. If this was a PA visit, I personally saw the patient with the PA and performed a substantive portion of the visit including all aspects of the medical decision making.    Tonight, severe R pelvic pain. Totally improved PTA. Check US for torsion. If unremarkable, then dc.

## 2025-01-12 NOTE — ED ADULT NURSE NOTE - OBJECTIVE STATEMENT
Patient received in room 7. Patient is AOx4, ambulatory, able to speak in full sentences, c/o right pelvic pain. Patient denies any past medical history. Patient states she was out to dinner and experienced severe right sided pelvic pain radiating down to the groin. Endorses nausea with no vomiting. 20G placed in RAC; labs drawn and sent. Patient pending TVUS. Denies chest pain, fever, chills, weakness or vaginal bleeding. Patient states she in menopause. Comfort measures maintained. Patient appears comfortable in no signs of acute distress, states pain subsided. Respirations even and unlabored, chest rise asymmetrical b/l. Bed in lowest position. Safety maintained.

## 2025-01-12 NOTE — ED PROVIDER NOTE - NSFOLLOWUPINSTRUCTIONS_ED_ALL_ED_FT
You were seen in the emergency department with pelvic pain.  Urine ultrasound performed which showed a possible mass on your right ovary.  If your pain returns you need to return to the emergency department as this could possibly be a medical emergency.  You need to make an appointment to see your OB/GYN as soon as possible to have this mass further evaluated.  Return the emergency department if you have any new or worsening symptoms.

## 2025-01-12 NOTE — ED PROVIDER NOTE - NSFOLLOWUPCLINICS_GEN_ALL_ED_FT
[facility]  OB/GYN & Surg  [address] [place] [address]  Phone: [phone]  Fax:

## 2025-01-12 NOTE — ED ADULT NURSE NOTE - CHIEF COMPLAINT QUOTE
Pt st " I have severe rt lower pelvic pain and diarreah this pain came on suddenly. " Unable to obtain temp in triage. Pt appears uncomfortable. doubled over

## 2025-01-14 ENCOUNTER — APPOINTMENT (OUTPATIENT)
Dept: UROGYNECOLOGY | Facility: CLINIC | Age: 65
End: 2025-01-14
Payer: COMMERCIAL

## 2025-01-14 VITALS
SYSTOLIC BLOOD PRESSURE: 122 MMHG | BODY MASS INDEX: 23.16 KG/M2 | HEIGHT: 65 IN | DIASTOLIC BLOOD PRESSURE: 76 MMHG | WEIGHT: 139 LBS

## 2025-01-14 DIAGNOSIS — N83.209 UNSPECIFIED OVARIAN CYST, UNSPECIFIED SIDE: ICD-10-CM

## 2025-01-14 DIAGNOSIS — N95.8 OTHER SPECIFIED MENOPAUSAL AND PERIMENOPAUSAL DISORDERS: ICD-10-CM

## 2025-01-14 DIAGNOSIS — N94.10 UNSPECIFIED DYSPAREUNIA: ICD-10-CM

## 2025-01-14 DIAGNOSIS — R10.2 PELVIC AND PERINEAL PAIN: ICD-10-CM

## 2025-01-14 DIAGNOSIS — M79.18 MYALGIA, OTHER SITE: ICD-10-CM

## 2025-01-14 PROCEDURE — 99205 OFFICE O/P NEW HI 60 MIN: CPT

## 2025-01-15 ENCOUNTER — RESULT CHARGE (OUTPATIENT)
Age: 65
End: 2025-01-15

## 2025-01-16 ENCOUNTER — APPOINTMENT (OUTPATIENT)
Dept: MRI IMAGING | Facility: CLINIC | Age: 65
End: 2025-01-16
Payer: COMMERCIAL

## 2025-01-16 ENCOUNTER — NON-APPOINTMENT (OUTPATIENT)
Age: 65
End: 2025-01-16

## 2025-01-16 ENCOUNTER — OUTPATIENT (OUTPATIENT)
Dept: OUTPATIENT SERVICES | Facility: HOSPITAL | Age: 65
LOS: 1 days | End: 2025-01-16
Payer: COMMERCIAL

## 2025-01-16 ENCOUNTER — OUTPATIENT (OUTPATIENT)
Dept: OUTPATIENT SERVICES | Facility: HOSPITAL | Age: 65
LOS: 1 days | End: 2025-01-16

## 2025-01-16 ENCOUNTER — APPOINTMENT (OUTPATIENT)
Dept: INTERNAL MEDICINE | Facility: CLINIC | Age: 65
End: 2025-01-16
Payer: COMMERCIAL

## 2025-01-16 VITALS
HEART RATE: 62 BPM | TEMPERATURE: 98 F | HEIGHT: 65 IN | SYSTOLIC BLOOD PRESSURE: 120 MMHG | WEIGHT: 139 LBS | DIASTOLIC BLOOD PRESSURE: 73 MMHG | BODY MASS INDEX: 23.16 KG/M2 | OXYGEN SATURATION: 100 %

## 2025-01-16 VITALS — WEIGHT: 139 LBS | BODY MASS INDEX: 23.16 KG/M2 | HEIGHT: 65 IN

## 2025-01-16 DIAGNOSIS — Z00.8 ENCOUNTER FOR OTHER GENERAL EXAMINATION: ICD-10-CM

## 2025-01-16 DIAGNOSIS — Z00.00 ENCOUNTER FOR GENERAL ADULT MEDICAL EXAMINATION W/OUT ABNORMAL FINDINGS: ICD-10-CM

## 2025-01-16 DIAGNOSIS — Z41.1 ENCOUNTER FOR COSMETIC SURGERY: Chronic | ICD-10-CM

## 2025-01-16 DIAGNOSIS — Z90.13 ACQUIRED ABSENCE OF BILATERAL BREASTS AND NIPPLES: Chronic | ICD-10-CM

## 2025-01-16 DIAGNOSIS — Z98.82 BREAST IMPLANT STATUS: Chronic | ICD-10-CM

## 2025-01-16 DIAGNOSIS — Z98.89 OTHER SPECIFIED POSTPROCEDURAL STATES: Chronic | ICD-10-CM

## 2025-01-16 DIAGNOSIS — E89.2 POSTPROCEDURAL HYPOPARATHYROIDISM: Chronic | ICD-10-CM

## 2025-01-16 DIAGNOSIS — N83.209 UNSPECIFIED OVARIAN CYST, UNSPECIFIED SIDE: ICD-10-CM

## 2025-01-16 PROCEDURE — A9585: CPT

## 2025-01-16 PROCEDURE — 72197 MRI PELVIS W/O & W/DYE: CPT | Mod: 26

## 2025-01-16 PROCEDURE — 72197 MRI PELVIS W/O & W/DYE: CPT

## 2025-01-16 PROCEDURE — 36415 COLL VENOUS BLD VENIPUNCTURE: CPT

## 2025-01-16 PROCEDURE — 93000 ELECTROCARDIOGRAM COMPLETE: CPT

## 2025-01-16 PROCEDURE — 99396 PREV VISIT EST AGE 40-64: CPT

## 2025-01-17 ENCOUNTER — TRANSCRIPTION ENCOUNTER (OUTPATIENT)
Age: 65
End: 2025-01-17

## 2025-01-17 LAB
ALBUMIN SERPL ELPH-MCNC: 4.6 G/DL
ALP BLD-CCNC: 78 U/L
ALT SERPL-CCNC: 26 U/L
ANION GAP SERPL CALC-SCNC: 12 MMOL/L
AST SERPL-CCNC: 24 U/L
BILIRUB SERPL-MCNC: 0.8 MG/DL
BUN SERPL-MCNC: 26 MG/DL
CALCIUM SERPL-MCNC: 10.3 MG/DL
CHLORIDE SERPL-SCNC: 104 MMOL/L
CHOLEST SERPL-MCNC: 200 MG/DL
CO2 SERPL-SCNC: 26 MMOL/L
CREAT SERPL-MCNC: 0.9 MG/DL
EGFR: 71 ML/MIN/1.73M2
ESTIMATED AVERAGE GLUCOSE: 114 MG/DL
GLUCOSE SERPL-MCNC: 90 MG/DL
HBA1C MFR BLD HPLC: 5.6 %
HCT VFR BLD CALC: 45.2 %
HDLC SERPL-MCNC: 70 MG/DL
HGB BLD-MCNC: 15 G/DL
LDLC SERPL CALC-MCNC: 114 MG/DL
MCHC RBC-ENTMCNC: 29.1 PG
MCHC RBC-ENTMCNC: 33.2 G/DL
MCV RBC AUTO: 87.6 FL
NONHDLC SERPL-MCNC: 130 MG/DL
PLATELET # BLD AUTO: 284 K/UL
POTASSIUM SERPL-SCNC: 4 MMOL/L
PROT SERPL-MCNC: 6.7 G/DL
RBC # BLD: 5.16 M/UL
RBC # FLD: 12.9 %
SODIUM SERPL-SCNC: 141 MMOL/L
TRIGL SERPL-MCNC: 89 MG/DL
TSH SERPL-ACNC: 1.12 UIU/ML
WBC # FLD AUTO: 6.12 K/UL

## 2025-01-20 ENCOUNTER — TRANSCRIPTION ENCOUNTER (OUTPATIENT)
Age: 65
End: 2025-01-20

## 2025-01-21 ENCOUNTER — TRANSCRIPTION ENCOUNTER (OUTPATIENT)
Age: 65
End: 2025-01-21

## 2025-01-23 ENCOUNTER — TRANSCRIPTION ENCOUNTER (OUTPATIENT)
Age: 65
End: 2025-01-23

## 2025-01-24 NOTE — H&P PST ADULT - NSANTHOBSERVEDRD_ENT_A_CORE
Attended Rounds. Discussed with Meliza GHOTRA.     Pt will return home with his wife when able with no anticipated SW needs.     SW will follow    PARVIN Agiurre   for [facility] ([facility])  [employer]: [phone]       No

## 2025-04-03 ENCOUNTER — INPATIENT (INPATIENT)
Facility: HOSPITAL | Age: 65
LOS: 9 days | Discharge: HOME | End: 2025-04-13
Attending: SURGERY | Admitting: SURGERY
Payer: COMMERCIAL

## 2025-04-03 VITALS
HEART RATE: 92 BPM | RESPIRATION RATE: 16 BRPM | HEIGHT: 65 IN | DIASTOLIC BLOOD PRESSURE: 56 MMHG | TEMPERATURE: 98 F | SYSTOLIC BLOOD PRESSURE: 128 MMHG | WEIGHT: 138.89 LBS | OXYGEN SATURATION: 100 %

## 2025-04-03 DIAGNOSIS — Z98.82 BREAST IMPLANT STATUS: Chronic | ICD-10-CM

## 2025-04-03 DIAGNOSIS — Z41.1 ENCOUNTER FOR COSMETIC SURGERY: Chronic | ICD-10-CM

## 2025-04-03 DIAGNOSIS — Z90.13 ACQUIRED ABSENCE OF BILATERAL BREASTS AND NIPPLES: Chronic | ICD-10-CM

## 2025-04-03 DIAGNOSIS — Z98.89 OTHER SPECIFIED POSTPROCEDURAL STATES: Chronic | ICD-10-CM

## 2025-04-03 DIAGNOSIS — E89.2 POSTPROCEDURAL HYPOPARATHYROIDISM: Chronic | ICD-10-CM

## 2025-04-03 LAB
ALBUMIN SERPL ELPH-MCNC: 4.2 G/DL — SIGNIFICANT CHANGE UP (ref 3.3–5)
ALP SERPL-CCNC: 71 U/L — SIGNIFICANT CHANGE UP (ref 40–120)
ALT FLD-CCNC: 23 U/L — SIGNIFICANT CHANGE UP (ref 4–33)
ANION GAP SERPL CALC-SCNC: 18 MMOL/L — HIGH (ref 7–14)
APTT BLD: 28.7 SEC — SIGNIFICANT CHANGE UP (ref 24.5–35.6)
AST SERPL-CCNC: 23 U/L — SIGNIFICANT CHANGE UP (ref 4–32)
BASOPHILS # BLD AUTO: 0.03 K/UL — SIGNIFICANT CHANGE UP (ref 0–0.2)
BASOPHILS NFR BLD AUTO: 0.3 % — SIGNIFICANT CHANGE UP (ref 0–2)
BILIRUB SERPL-MCNC: 1.3 MG/DL — HIGH (ref 0.2–1.2)
BLD GP AB SCN SERPL QL: NEGATIVE — SIGNIFICANT CHANGE UP
BLOOD GAS VENOUS COMPREHENSIVE RESULT: SIGNIFICANT CHANGE UP
BUN SERPL-MCNC: 20 MG/DL — SIGNIFICANT CHANGE UP (ref 7–23)
CALCIUM SERPL-MCNC: 10.1 MG/DL — SIGNIFICANT CHANGE UP (ref 8.4–10.5)
CHLORIDE SERPL-SCNC: 101 MMOL/L — SIGNIFICANT CHANGE UP (ref 98–107)
CO2 SERPL-SCNC: 18 MMOL/L — LOW (ref 22–31)
CREAT SERPL-MCNC: 0.86 MG/DL — SIGNIFICANT CHANGE UP (ref 0.5–1.3)
EGFR: 75 ML/MIN/1.73M2 — SIGNIFICANT CHANGE UP
EGFR: 75 ML/MIN/1.73M2 — SIGNIFICANT CHANGE UP
EOSINOPHIL # BLD AUTO: 0.01 K/UL — SIGNIFICANT CHANGE UP (ref 0–0.5)
EOSINOPHIL NFR BLD AUTO: 0.1 % — SIGNIFICANT CHANGE UP (ref 0–6)
GLUCOSE SERPL-MCNC: 146 MG/DL — HIGH (ref 70–99)
HCT VFR BLD CALC: 42.7 % — SIGNIFICANT CHANGE UP (ref 34.5–45)
HGB BLD-MCNC: 15 G/DL — SIGNIFICANT CHANGE UP (ref 11.5–15.5)
IANC: 8.92 K/UL — HIGH (ref 1.8–7.4)
IMM GRANULOCYTES NFR BLD AUTO: 0.4 % — SIGNIFICANT CHANGE UP (ref 0–0.9)
INR BLD: 1.14 RATIO — SIGNIFICANT CHANGE UP (ref 0.85–1.16)
LIDOCAIN IGE QN: 41 U/L — SIGNIFICANT CHANGE UP (ref 7–60)
LYMPHOCYTES # BLD AUTO: 0.93 K/UL — LOW (ref 1–3.3)
LYMPHOCYTES # BLD AUTO: 8.8 % — LOW (ref 13–44)
MCHC RBC-ENTMCNC: 29.2 PG — SIGNIFICANT CHANGE UP (ref 27–34)
MCHC RBC-ENTMCNC: 35.1 G/DL — SIGNIFICANT CHANGE UP (ref 32–36)
MCV RBC AUTO: 83.1 FL — SIGNIFICANT CHANGE UP (ref 80–100)
MONOCYTES # BLD AUTO: 0.61 K/UL — SIGNIFICANT CHANGE UP (ref 0–0.9)
MONOCYTES NFR BLD AUTO: 5.8 % — SIGNIFICANT CHANGE UP (ref 2–14)
NEUTROPHILS # BLD AUTO: 8.92 K/UL — HIGH (ref 1.8–7.4)
NEUTROPHILS NFR BLD AUTO: 84.6 % — HIGH (ref 43–77)
NRBC # BLD AUTO: 0 K/UL — SIGNIFICANT CHANGE UP (ref 0–0)
NRBC # FLD: 0 K/UL — SIGNIFICANT CHANGE UP (ref 0–0)
NRBC BLD AUTO-RTO: 0 /100 WBCS — SIGNIFICANT CHANGE UP (ref 0–0)
PLATELET # BLD AUTO: 300 K/UL — SIGNIFICANT CHANGE UP (ref 150–400)
POTASSIUM SERPL-MCNC: 3.2 MMOL/L — LOW (ref 3.5–5.3)
POTASSIUM SERPL-SCNC: 3.2 MMOL/L — LOW (ref 3.5–5.3)
PROT SERPL-MCNC: 6.9 G/DL — SIGNIFICANT CHANGE UP (ref 6–8.3)
PROTHROM AB SERPL-ACNC: 13.2 SEC — SIGNIFICANT CHANGE UP (ref 9.9–13.4)
RBC # BLD: 5.14 M/UL — SIGNIFICANT CHANGE UP (ref 3.8–5.2)
RBC # FLD: 12.7 % — SIGNIFICANT CHANGE UP (ref 10.3–14.5)
RH IG SCN BLD-IMP: POSITIVE — SIGNIFICANT CHANGE UP
SODIUM SERPL-SCNC: 137 MMOL/L — SIGNIFICANT CHANGE UP (ref 135–145)
WBC # BLD: 10.54 K/UL — HIGH (ref 3.8–10.5)
WBC # FLD AUTO: 10.54 K/UL — HIGH (ref 3.8–10.5)

## 2025-04-03 PROCEDURE — 99285 EMERGENCY DEPT VISIT HI MDM: CPT

## 2025-04-03 RX ORDER — MAGNESIUM CITRATE
296 SOLUTION, ORAL ORAL ONCE
Refills: 0 | Status: COMPLETED | OUTPATIENT
Start: 2025-04-03 | End: 2025-04-03

## 2025-04-03 RX ORDER — ONDANSETRON HCL/PF 4 MG/2 ML
4 VIAL (ML) INJECTION ONCE
Refills: 0 | Status: COMPLETED | OUTPATIENT
Start: 2025-04-03 | End: 2025-04-03

## 2025-04-03 RX ORDER — SALINE 7; 19 G/118ML; G/118ML
1 ENEMA RECTAL ONCE
Refills: 0 | Status: COMPLETED | OUTPATIENT
Start: 2025-04-03 | End: 2025-04-03

## 2025-04-03 RX ORDER — POLYETHYLENE GLYCOL-3350 AND ELECTROLYTES 236; 6.74; 5.86; 2.97; 22.74 G/274.31G; G/274.31G; G/274.31G; G/274.31G; G/274.31G
4000 POWDER, FOR SOLUTION ORAL ONCE
Refills: 0 | Status: COMPLETED | OUTPATIENT
Start: 2025-04-03 | End: 2025-04-03

## 2025-04-03 RX ORDER — KETOROLAC TROMETHAMINE 30 MG/ML
15 INJECTION, SOLUTION INTRAMUSCULAR; INTRAVENOUS ONCE
Refills: 0 | Status: DISCONTINUED | OUTPATIENT
Start: 2025-04-03 | End: 2025-04-03

## 2025-04-03 RX ORDER — ACETAMINOPHEN 500 MG/5ML
1000 LIQUID (ML) ORAL ONCE
Refills: 0 | Status: COMPLETED | OUTPATIENT
Start: 2025-04-03 | End: 2025-04-03

## 2025-04-03 RX ADMIN — Medication 400 MILLIGRAM(S): at 20:40

## 2025-04-03 RX ADMIN — Medication 4 MILLIGRAM(S): at 20:19

## 2025-04-03 RX ADMIN — SALINE 1 ENEMA: 7; 19 ENEMA RECTAL at 20:39

## 2025-04-03 RX ADMIN — POLYETHYLENE GLYCOL-3350 AND ELECTROLYTES 4000 MILLILITER(S): 236; 6.74; 5.86; 2.97; 22.74 POWDER, FOR SOLUTION ORAL at 23:14

## 2025-04-03 RX ADMIN — Medication 4 MILLIGRAM(S): at 23:14

## 2025-04-03 RX ADMIN — KETOROLAC TROMETHAMINE 15 MILLIGRAM(S): 30 INJECTION, SOLUTION INTRAMUSCULAR; INTRAVENOUS at 23:52

## 2025-04-03 RX ADMIN — Medication 1000 MILLILITER(S): at 20:19

## 2025-04-03 RX ADMIN — Medication 1000 MILLILITER(S): at 23:14

## 2025-04-03 NOTE — ED PROVIDER NOTE - CLINICAL SUMMARY MEDICAL DECISION MAKING FREE TEXT BOX
A/P   64-year-old female as described above, history of breast CA, presents with pruritic current abdominal pain which were started in January.  Patient's had multiple imaging studies include MRI, CT and x-ray along with colonoscopy.  Patient referred by her GI physician today after being seen evaluated in the office.  Exam noted for being afebrile, lower abdomen slightly distended however soft nontender.  Plan: X-rays, bowel regimen, if no improvement CT.  Reassess  Relevant EMR reviewed

## 2025-04-03 NOTE — ED PROVIDER NOTE - PROGRESS NOTE DETAILS
Blaise Navarro, ED Attending: Pt signed out to me, per prior attg conversation with oupt GI - had recent CT that did not show bowel obstruction. Pt ordered for Go lytely prior to my shift but not tolerating it DCed, switched to mag citrate but with no bowel movement - Decision made to order a repeat CT - appears to be possible LBO, per radiology conversation - Sigmoid volvulus. Will contact GI and surgery.

## 2025-04-03 NOTE — ED PROVIDER NOTE - PHYSICAL EXAMINATION
Attending/Abel: Well-appearing NAD; PERRL/EOMI, non-icterus, supple, no CANDIS, no JVD, RRR, CTAB; Abd-soft, lwoer abd slightly distended, nontender, no HSM; no LE edema, A&Ox3, nonfocal; Skin-warm/dry

## 2025-04-03 NOTE — ED PROVIDER NOTE - OBJECTIVE STATEMENT
Attending/Abel: 63 yo F h/o remote DCIS status post mastectomy Presents with abdominal pain.  Reports initially developing lower abdominal pain  this past January had imaging which did not reveal acute changes. Patient  had CT on March 25 with revealed significant stool. Collateral information provided by her GI physician, Dr. Bong Del Cid who reported preforming multiple colonoscopies over the years with the most recent on in January. Patient recent traveled from [facility] when she develop recurrent symptoms, had an MRI which reportedly t revealed dilated SB however no obstruction., She now p/w with consciousness abd pina, distention with small gas. Last BM yesterday. Patient was seen and evaluated by Dr. Del Cid in his office, xray reveals stool to the rectum.

## 2025-04-03 NOTE — ED ADULT NURSE NOTE - OBJECTIVE STATEMENT
pt presents to ED A&04 ambulatory at baseline coming in complaining of lower abdominal pain/distension since january with multiple follow up with her GI physician. Patient passing gas and stool at this time, No complaints of chest pain, headache, nausea, dizziness, vomiting  SOB, fever, chills verbalized. 18GLAC In place, medicated as per order
None

## 2025-04-03 NOTE — ED ADULT NURSE NOTE - BMI (KG/M2)
Department of Anesthesiology  Preprocedure Note       Name:  Chelsea Steve   Age:  59 y.o.  :  1966                                          MRN:  [id number]         Date:  4/3/2025      Surgeon: Surgeon(s):  Chris Cervantes MD    Procedure: Procedure(s):  LEFT SHOULDER MANIPULATION UNDER ANESTHESIA  CORTICOSTEROID INJECTION    Medications prior to admission:   Prior to Admission medications    Medication Sig Start Date End Date Taking? Authorizing Provider   diclofenac (VOLTAREN) 75 MG EC tablet Take 1 tablet by mouth 2 times daily as needed for Pain   Yes Sergey Mi MD   progesterone (PROMETRIUM) 100 MG CAPS capsule Take 1 capsule by mouth nightly 24  Yes Sergey Mi MD   butalbital-acetaminophen-caffeine (FIORICET, ESGIC) -40 MG per tablet Take 1 tablet by mouth every 12 hours as needed 3/7/23  Yes Sergey Mi MD   ALPRAZolam (XANAX) 0.5 MG tablet Take 1 tablet by mouth 3 times daily as needed. 18  Yes Sergey Mi MD   valACYclovir (VALTREX) 1 g tablet Take 2 tablets by mouth 2 times daily as needed    Sergey Mi MD   D3 HIGH POTENCY 125 MCG (5000 UT) CAPS capsule Take 1 capsule by mouth daily 25   Sergey Mi MD   cyclobenzaprine (FLEXERIL) 10 MG tablet Take 1 tablet by mouth 2 times daily as needed 25   Sergey Mi MD   estradiol (VIVELLE) 0.1 MG/24HR Place 1 patch onto the skin Twice a Week Monday and 23   Sergey Mi MD   phentermine 30 MG capsule Take 1 capsule by mouth daily as needed. 23   Sergey Mi MD   cyanocobalamin 1000 MCG/ML injection Inject 1 mL into the muscle once 22   Sergey Mi MD       Current medications:    Current Facility-Administered Medications   Medication Dose Route Frequency Provider Last Rate Last Admin   • ceFAZolin (ANCEF) 2,000 mg in sterile water 20 mL IV syringe  2,000 mg IntraVENous Once Chris Cervantes MD       • sodium 
21.6

## 2025-04-03 NOTE — ED ADULT NURSE NOTE - NS PRO PASSIVE SMOKE EXP
Pt BIB EMS for left sided CP/rib pain worse on inspiration and left arm numbness since this morning. No

## 2025-04-03 NOTE — ED ADULT NURSE NOTE - NSFALLUNIVINTERV_ED_ALL_ED
Bed/Stretcher in lowest position, wheels locked, appropriate side rails in place/Call bell, personal items and telephone in reach/Instruct patient to call for assistance before getting out of bed/chair/stretcher/Non-slip footwear applied when patient is off stretcher/[place] to call system/Physically safe environment - no spills, clutter or unnecessary equipment/Purposeful proactive rounding/Room/bathroom lighting operational, light cord in reach

## 2025-04-03 NOTE — ED ADULT TRIAGE NOTE - CHIEF COMPLAINT QUOTE
from [place] X 2.5 weeks ago, c/o abdominal pain and distention since yesterday at 1500 PM, distention noted in triage, pt uncomfortable. last BM was yesterday. took enema X 2 hours ago with no relief. pt also reports had XR earlier today, showing impaction. pt is being followed Dr. Del Cid (gastroenterologist). denies chest pain or SOB. Phx double mastectomy, GERD

## 2025-04-04 ENCOUNTER — TRANSCRIPTION ENCOUNTER (OUTPATIENT)
Age: 65
End: 2025-04-04

## 2025-04-04 DIAGNOSIS — K56.609 UNSPECIFIED INTESTINAL OBSTRUCTION, UNSPECIFIED AS TO PARTIAL VERSUS COMPLETE OBSTRUCTION: ICD-10-CM

## 2025-04-04 LAB
ANION GAP SERPL CALC-SCNC: 14 MMOL/L — SIGNIFICANT CHANGE UP (ref 7–14)
APTT BLD: 28.6 SEC — SIGNIFICANT CHANGE UP (ref 24.5–35.6)
BLD GP AB SCN SERPL QL: NEGATIVE — SIGNIFICANT CHANGE UP
BUN SERPL-MCNC: 15 MG/DL — SIGNIFICANT CHANGE UP (ref 7–23)
CALCIUM SERPL-MCNC: 8.5 MG/DL — SIGNIFICANT CHANGE UP (ref 8.4–10.5)
CHLORIDE SERPL-SCNC: 105 MMOL/L — SIGNIFICANT CHANGE UP (ref 98–107)
CO2 SERPL-SCNC: 20 MMOL/L — LOW (ref 22–31)
CREAT SERPL-MCNC: 0.69 MG/DL — SIGNIFICANT CHANGE UP (ref 0.5–1.3)
EGFR: 97 ML/MIN/1.73M2 — SIGNIFICANT CHANGE UP
EGFR: 97 ML/MIN/1.73M2 — SIGNIFICANT CHANGE UP
GLUCOSE SERPL-MCNC: 123 MG/DL — HIGH (ref 70–99)
HCG SERPL-ACNC: 1.3 MIU/ML — SIGNIFICANT CHANGE UP
HCT VFR BLD CALC: 40.4 % — SIGNIFICANT CHANGE UP (ref 34.5–45)
HGB BLD-MCNC: 13.9 G/DL — SIGNIFICANT CHANGE UP (ref 11.5–15.5)
INR BLD: 1.17 RATIO — HIGH (ref 0.85–1.16)
MAGNESIUM SERPL-MCNC: 2.2 MG/DL — SIGNIFICANT CHANGE UP (ref 1.6–2.6)
MCHC RBC-ENTMCNC: 29.1 PG — SIGNIFICANT CHANGE UP (ref 27–34)
MCHC RBC-ENTMCNC: 34.4 G/DL — SIGNIFICANT CHANGE UP (ref 32–36)
MCV RBC AUTO: 84.5 FL — SIGNIFICANT CHANGE UP (ref 80–100)
NRBC # BLD AUTO: 0 K/UL — SIGNIFICANT CHANGE UP (ref 0–0)
NRBC # FLD: 0 K/UL — SIGNIFICANT CHANGE UP (ref 0–0)
NRBC BLD AUTO-RTO: 0 /100 WBCS — SIGNIFICANT CHANGE UP (ref 0–0)
PHOSPHATE SERPL-MCNC: 2.7 MG/DL — SIGNIFICANT CHANGE UP (ref 2.5–4.5)
PLATELET # BLD AUTO: 266 K/UL — SIGNIFICANT CHANGE UP (ref 150–400)
POTASSIUM SERPL-MCNC: 3.2 MMOL/L — LOW (ref 3.5–5.3)
POTASSIUM SERPL-SCNC: 3.2 MMOL/L — LOW (ref 3.5–5.3)
PROTHROM AB SERPL-ACNC: 13.9 SEC — HIGH (ref 9.9–13.4)
RBC # BLD: 4.78 M/UL — SIGNIFICANT CHANGE UP (ref 3.8–5.2)
RBC # FLD: 13 % — SIGNIFICANT CHANGE UP (ref 10.3–14.5)
RH IG SCN BLD-IMP: POSITIVE — SIGNIFICANT CHANGE UP
SODIUM SERPL-SCNC: 139 MMOL/L — SIGNIFICANT CHANGE UP (ref 135–145)
WBC # BLD: 11.58 K/UL — HIGH (ref 3.8–10.5)
WBC # FLD AUTO: 11.58 K/UL — HIGH (ref 3.8–10.5)

## 2025-04-04 PROCEDURE — 74177 CT ABD & PELVIS W/CONTRAST: CPT | Mod: 26

## 2025-04-04 PROCEDURE — 44140 PARTIAL REMOVAL OF COLON: CPT

## 2025-04-04 PROCEDURE — 88342 IMHCHEM/IMCYTCHM 1ST ANTB: CPT | Mod: 26

## 2025-04-04 PROCEDURE — 99223 1ST HOSP IP/OBS HIGH 75: CPT | Mod: 57

## 2025-04-04 PROCEDURE — 88307 TISSUE EXAM BY PATHOLOGIST: CPT | Mod: 26

## 2025-04-04 DEVICE — STAPLER COVIDIEN TA 90 BLUE: Type: IMPLANTABLE DEVICE | Site: RIGHT | Status: FUNCTIONAL

## 2025-04-04 DEVICE — STAPLER COVIDIEN GIA 80-3.0MM PURPLE: Type: IMPLANTABLE DEVICE | Site: RIGHT | Status: FUNCTIONAL

## 2025-04-04 DEVICE — STAPLER COVIDIEN GIA 80-3.0MM PURPLE RELOAD: Type: IMPLANTABLE DEVICE | Site: RIGHT | Status: FUNCTIONAL

## 2025-04-04 DEVICE — STAPLER CONTOUR CURVED WITH BLUE CART: Type: IMPLANTABLE DEVICE | Site: RIGHT | Status: FUNCTIONAL

## 2025-04-04 DEVICE — STAPLER COVIDIEN CIRCULAR TRI-STAPLE 28MM BLACK MEDIUM/THICK XL: Type: IMPLANTABLE DEVICE | Site: RIGHT | Status: FUNCTIONAL

## 2025-04-04 RX ORDER — HYDROMORPHONE/SOD CHLOR,ISO/PF 2 MG/10 ML
0.5 SYRINGE (ML) INJECTION
Refills: 0 | Status: DISCONTINUED | OUTPATIENT
Start: 2025-04-04 | End: 2025-04-04

## 2025-04-04 RX ORDER — SODIUM CHLORIDE 9 G/1000ML
1000 INJECTION, SOLUTION INTRAVENOUS
Refills: 0 | Status: DISCONTINUED | OUTPATIENT
Start: 2025-04-04 | End: 2025-04-06

## 2025-04-04 RX ORDER — ACETAMINOPHEN 500 MG/5ML
1000 LIQUID (ML) ORAL ONCE
Refills: 0 | Status: COMPLETED | OUTPATIENT
Start: 2025-04-04 | End: 2025-04-04

## 2025-04-04 RX ORDER — CALCIUM CARBONATE 750 MG/1
1 TABLET ORAL
Refills: 0 | DISCHARGE

## 2025-04-04 RX ORDER — KETOROLAC TROMETHAMINE 30 MG/ML
15 INJECTION, SOLUTION INTRAMUSCULAR; INTRAVENOUS ONCE
Refills: 0 | Status: DISCONTINUED | OUTPATIENT
Start: 2025-04-04 | End: 2025-04-04

## 2025-04-04 RX ORDER — DIATRIZOATE MEGLUMINE, SODIUM 66 %-10 %
30 VIAL (ML) INJECTION ONCE
Refills: 0 | Status: COMPLETED | OUTPATIENT
Start: 2025-04-04 | End: 2025-04-04

## 2025-04-04 RX ORDER — FENTANYL CITRATE-0.9 % NACL/PF 100MCG/2ML
25 SYRINGE (ML) INTRAVENOUS ONCE
Refills: 0 | Status: DISCONTINUED | OUTPATIENT
Start: 2025-04-04 | End: 2025-04-04

## 2025-04-04 RX ORDER — FENTANYL CITRATE-0.9 % NACL/PF 100MCG/2ML
25 SYRINGE (ML) INTRAVENOUS
Refills: 0 | Status: DISCONTINUED | OUTPATIENT
Start: 2025-04-04 | End: 2025-04-04

## 2025-04-04 RX ORDER — HYDROMORPHONE/SOD CHLOR,ISO/PF 2 MG/10 ML
0.5 SYRINGE (ML) INJECTION EVERY 4 HOURS
Refills: 0 | Status: DISCONTINUED | OUTPATIENT
Start: 2025-04-04 | End: 2025-04-04

## 2025-04-04 RX ORDER — ENOXAPARIN SODIUM 100 MG/ML
40 INJECTION SUBCUTANEOUS EVERY 24 HOURS
Refills: 0 | Status: DISCONTINUED | OUTPATIENT
Start: 2025-04-04 | End: 2025-04-04

## 2025-04-04 RX ORDER — HYDROMORPHONE/SOD CHLOR,ISO/PF 2 MG/10 ML
0.2 SYRINGE (ML) INJECTION EVERY 4 HOURS
Refills: 0 | Status: DISCONTINUED | OUTPATIENT
Start: 2025-04-04 | End: 2025-04-04

## 2025-04-04 RX ORDER — SODIUM CHLORIDE 9 G/1000ML
1000 INJECTION, SOLUTION INTRAVENOUS
Refills: 0 | Status: DISCONTINUED | OUTPATIENT
Start: 2025-04-04 | End: 2025-04-04

## 2025-04-04 RX ORDER — ACETAMINOPHEN 500 MG/5ML
1000 LIQUID (ML) ORAL EVERY 6 HOURS
Refills: 0 | Status: COMPLETED | OUTPATIENT
Start: 2025-04-04 | End: 2025-04-05

## 2025-04-04 RX ORDER — OXYCODONE HYDROCHLORIDE 30 MG/1
2.5 TABLET ORAL EVERY 4 HOURS
Refills: 0 | Status: DISCONTINUED | OUTPATIENT
Start: 2025-04-04 | End: 2025-04-04

## 2025-04-04 RX ORDER — HEPARIN SODIUM 1000 [USP'U]/ML
5000 INJECTION INTRAVENOUS; SUBCUTANEOUS EVERY 8 HOURS
Refills: 0 | Status: DISCONTINUED | OUTPATIENT
Start: 2025-04-04 | End: 2025-04-13

## 2025-04-04 RX ORDER — OXYCODONE HYDROCHLORIDE 30 MG/1
5 TABLET ORAL EVERY 4 HOURS
Refills: 0 | Status: DISCONTINUED | OUTPATIENT
Start: 2025-04-04 | End: 2025-04-04

## 2025-04-04 RX ADMIN — Medication 100 MILLIEQUIVALENT(S): at 15:36

## 2025-04-04 RX ADMIN — SODIUM CHLORIDE 100 MILLILITER(S): 9 INJECTION, SOLUTION INTRAVENOUS at 21:38

## 2025-04-04 RX ADMIN — Medication 400 MILLIGRAM(S): at 15:32

## 2025-04-04 RX ADMIN — Medication 25 MICROGRAM(S): at 06:33

## 2025-04-04 RX ADMIN — Medication 30 MILLILITER(S): at 02:59

## 2025-04-04 RX ADMIN — KETOROLAC TROMETHAMINE 15 MILLIGRAM(S): 30 INJECTION, SOLUTION INTRAMUSCULAR; INTRAVENOUS at 05:12

## 2025-04-04 RX ADMIN — Medication 1000 MILLIGRAM(S): at 22:39

## 2025-04-04 RX ADMIN — KETOROLAC TROMETHAMINE 15 MILLIGRAM(S): 30 INJECTION, SOLUTION INTRAMUSCULAR; INTRAVENOUS at 05:55

## 2025-04-04 RX ADMIN — SODIUM CHLORIDE 100 MILLILITER(S): 9 INJECTION, SOLUTION INTRAVENOUS at 15:37

## 2025-04-04 RX ADMIN — Medication 1000 MILLIGRAM(S): at 05:12

## 2025-04-04 RX ADMIN — Medication 1000 MILLILITER(S): at 05:55

## 2025-04-04 RX ADMIN — Medication 296 MILLILITER(S): at 01:14

## 2025-04-04 RX ADMIN — HEPARIN SODIUM 5000 UNIT(S): 1000 INJECTION INTRAVENOUS; SUBCUTANEOUS at 15:36

## 2025-04-04 RX ADMIN — Medication 400 MILLIGRAM(S): at 03:37

## 2025-04-04 RX ADMIN — Medication 1000 MILLIGRAM(S): at 16:32

## 2025-04-04 RX ADMIN — Medication 400 MILLIGRAM(S): at 21:38

## 2025-04-04 NOTE — ED ADULT NURSE REASSESSMENT NOTE - NS ED NURSE REASSESS COMMENT FT1
Pt resting comfortably at this time, respirations even and unlabored, appears in NAD. No complaints of chest pain, headache, nausea, dizziness, vomiting  SOB, fever, chills verbalized. Pending surgery recommendations.

## 2025-04-04 NOTE — CHART NOTE - NSCHARTNOTEFT_GEN_A_CORE
CT read as cecal volvulus.  No GI intervention for cecal volvulus  Surgery evaluation    Cherri Hernandez, PGY4  Gastroenterology/Hepatology Fellow  Available on Microsoft Teams  [phone] (Long Range Pager)  [phone] (Short Range Pager [facility])    After 5 pm, please contact the on-call GI fellow for any urgent issues via the Hospital Call 
POST-OP NOTE    MALISSA DONOHUE | [id number] | [facility]LENE [facility] LTW4 417 A    Procedure: s/p ex lap, right hemicolectomy with ileotransverse colon anastomosis (4/4/25)    Subjective: Patient says she is feeling well postoperatively. Denies nausea or vomiting and has been having sips without issue. Says her pain is controlled, no acute complaints. Says she feels much better than prior to surgery.     Vital Signs Last 24 Hrs  T(C): 37.2 (04 Apr 2025 13:00), Max: 37.2 (04 Apr 2025 13:00)  T(F): 99 (04 Apr 2025 13:00), Max: 99 (04 Apr 2025 13:00)  HR: 95 (04 Apr 2025 13:00) (77 - 96)  BP: 122/83 (04 Apr 2025 13:00) (122/83 - 155/73)  BP(mean): 92 (04 Apr 2025 13:00) (86 - 98)  RR: 20 (04 Apr 2025 13:00) (12 - 20)  SpO2: 98% (04 Apr 2025 13:00) (96% - 100%)    Parameters below as of 04 Apr 2025 13:00  Patient On (Oxygen Delivery Method): room air      I&O's Summary    04 Apr 2025 07:01  -  04 Apr 2025 15:59  --------------------------------------------------------  IN: 100 mL / OUT: 210 mL / NET: -110 mL                            13.9   11.58 )-----------( 266      ( 04 Apr 2025 07:16 )             40.4     04-04    139  |  105  |  15  ----------------------------<  123[H]  3.2[L]   |  20[L]  |  0.69    Ca    8.5      04 Apr 2025 07:16  Phos  2.7     04-04  Mg     2.20     04-04    TPro  6.9  /  Alb  4.2  /  TBili  1.3[H]  /  DBili  x   /  AST  23  /  ALT  23  /  AlkPhos  71  04-03   PT/INR - ( 04 Apr 2025 07:16 )   PT: 13.9 sec;   INR: 1.17 ratio         PTT - ( 04 Apr 2025 07:16 )  PTT:28.6 sec    PHYSICAL EXAM:  Gen: NAD  Resp: breathing easily, no stridor  CV: RRR  Abdomen: soft, mildly ttp near midline laparotomy, significantly improved distention. Incision dressing with gauze/tegaderm, mildly saturated.     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 She is recovering very well postoperatively.    P:  - VTE PPx: SQH, SCDs  - Diet: Continue sips overnight  - IVFs at 100    - Pain: IV APAP, Dilaudid PRN  - Keep torres overnight, monitor UOP  - No home meds   - ARBF    A team  [id number]
GI team was contacted by ED provider due to concern for sigmoid volvulus. Discussed with ED team. CT personally reviewed. Patient was evaluated by Surgery with concern for possible cecal volvulus. Pending Radiology review of CT.     Recommendations:  - F/u Radiology review of CT scan and surgical plans  - NPO with IVF hydration  - No endoscopic intervention if confirmed cecal volvulus. Will evaluate for colonic decompression if confirmed sigmoid volvulus     Binh Gracia MD  Gastroenterology/Hepatology Fellow

## 2025-04-04 NOTE — ASU PREOP CHECKLIST - 1.
Warm [place] Warm [place]      ///// NGT placed in ER - hooked up to suction in ASU - draining 675cc of brownish green fluid

## 2025-04-04 NOTE — CONSULT NOTE ADULT - SUBJECTIVE AND OBJECTIVE BOX
A TEAM SURGERY CONSULT NOTE  MALISSA DONOHUE  |  [id number]  |  25 @ 05:38    CC: Patient is a 64y old  Female who presents with a chief complaint of abdominal distension adn pain     HPI: 64F h/o remote DCIS status post mastectomy presents with abdominal pain.  Reports initially developing lower abdominal pain  this past January had imaging which did not reveal acute changes. Patient  had CT on  with revealed significant stool. Collateral information provided by her GI physician, Dr. Bong Del Cid who reported preforming multiple colonoscopies over the years with the most recent one ~2years ago. Patient recent traveled from [facility] when she develop recurrent symptoms, had an MRI which reportedly had no obstruction. She now p/w with abdominal pain and distension. Last BM yesterday. Patient was seen and evaluated by Dr. Del Cid in his office, xray revealed stool to the rectum, was sent to the ED for further eval. At baseline, patient has constipation (BM ~4-5 days). Currently denies nausea/vomiting/fevers.     INTERVAL EVENTS: In the ED, hemodynamically normal. Was given golytley for presumed dx of constipation, however did not improve. Underwent cross-sectional imaging [facility] revealed possible large bowel volvulus. Surgery consulted.     REVIEW OF SYSTEMS:  General: denies weight change, fever or fatigue  HEENT: denies sore throat, hoarseness  Respiratory: denies cough, shortness of breath at rest and on exertion, wheezing  Cardiovascular: denies chest pain, abnormal heart rhythm, PND, palpitations  Gastrointestinal: denies nausea, vomiting, diarrhea, bloody or black bowel movements  Genitourinary: denies frequent urination, painful urination, kidney disease  Neurological: denies seizures, headaches  Muscoloskeletal: denies any joint pains  Psychiatric: denies depression, anxiety    PAST MEDICAL & SURGICAL HISTORY:  GERD (gastroesophageal reflux disease)  Spontaneous     Hyperparathyroidism    Ductal carcinoma in situ (DCIS) of left breast    H/O breast biopsy  pt. can't recall which breast-2005- negative  Biopsy  - left - DCIS  H/O rhinoplasty    Liveborn by   c x 1 - Triplets  History f parathyroidectomy  10/2014  H/O bilateral mastectomy  H/O breast reconstruction    MEDICATIONS  (home) none  Allergies  penicillin (Unknown)  sulfa drugs (Unknown)  morphine (Other)    SOCIAL HISTORY: noncontributory    FAMILY HISTORY: noncontributory    Objective:   Vital Signs Last 24 Hrs  T(C): 36.6 (2025 01:45), Max: 36.7 (2025 21:45)  T(F): 97.8 (2025 01:45), Max: 98 (2025 21:45)  HR: 92 (2025 01:45) (77 - 92)  BP: 130/74 (2025 01:45) (124/75 - 130/74)  BP(mean): --  RR: 18 (2025 01:45) (16 - 18)  SpO2: 98% (2025 01:45) (98% - 100%)      Physical Exam:  General: NAD, resting comfortably   CV: regular rate and rhythm   Pulm: no increased work of breathing   Abdomen: soft, nontender, softly distended, no rebound or guarding    Extremities: warm and well perfused      LABS:                        15.0   10.54 )-----------( 300      ( 2025 20:16 )             42.7     04-03    137  |  101  |  20  ----------------------------<  146[H]  3.2[L]   |  18[L]  |  0.86    Ca    10.1      2025 20:16    TPro  6.9  /  Alb  4.2  /  TBili  1.3[H]  /  DBili  x   /  AST  23  /  ALT  23  /  AlkPhos  71  04-03    PT/INR - ( 2025 20:16 )   PT: 13.2 sec;   INR: 1.14 ratio         PTT - ( 2025 20:16 )  PTT:28.7 sec  LIVER FUNCTIONS - ( 2025 20:16 )  Alb: 4.2 g/dL / Pro: 6.9 g/dL / ALK PHOS: 71 U/L / ALT: 23 U/L / AST: 23 U/L / GGT: x           Urinalysis Basic - ( 2025 20:16 )    Color: x / Appearance: x / SG: x / pH: x  Gluc: 146 mg/dL / Ketone: x  / Bili: x / Urobili: x   Blood: x / Protein: x / Nitrite: x   Leuk Esterase: x / RBC: x / WBC x   Sq Epi: x / Non Sq Epi: x / Bacteria: x              RADIOLOGY & ADDITIONAL STUDIES:    marcela

## 2025-04-04 NOTE — BRIEF OPERATIVE NOTE - OPERATION/FINDINGS
Exploratory laparotomy with findings of cecal volvulus. Right hemicolectomy performed with ileotransverse colon anastamosis in side to side fashion with Aaron technique   Fascia closed primarily and skin closed withs taples and wound hossein

## 2025-04-04 NOTE — ED ADULT NURSE REASSESSMENT NOTE - NS ED NURSE REASSESS COMMENT FT1
MD Johns placed NGT in patient, Report given to ASU and OR RN. Pt valuables sent to security, 18GLAC in place, no redness or swelling noted. No complaints of chest pain, headache, nausea, dizziness, vomiting  SOB, fever, chills verbalized. pending transport to OR.

## 2025-04-04 NOTE — PATIENT PROFILE ADULT - INTERNATIONAL TRAVEL COUNTRIES
Faxed over a request for medical records to Dr. Cicero Phalen to fax number [phone] requesting Records from May  and on.
[place]

## 2025-04-04 NOTE — H&P ADULT - ASSESSMENT
64F h/o remote DCIS status post mastectomy presents with abdominal pain and distension x1d i/s/o chronic constipation, with CT concerning for possible cecal volvulus.     Plan  - admit to surgery under Dr. Carlson  - NPO/IVF/NGT  - add on to OR  - pain control PRN   - dvt ppx  - will require med rec     Discussed with fellow, Dr. Castillo    Surgery A Team  [id number]

## 2025-04-04 NOTE — H&P ADULT - HISTORY OF PRESENT ILLNESS
64F h/o remote DCIS status post mastectomy presents with abdominal pain.  Reports initially developing lower abdominal pain  this past January had imaging which did not reveal acute changes. Patient  had CT on March 25 with revealed significant stool. Collateral information provided by her GI physician, Dr. Bong Del Cid who reported preforming multiple colonoscopies over the years with the most recent one ~2years ago. Patient recent traveled from [facility] when she develop recurrent symptoms, had an MRI which reportedly had no obstruction. She now p/w with abdominal pain and distension. Last BM yesterday. Patient was seen and evaluated by Dr. Del Cid in his office, xray revealed stool to the rectum, was sent to the ED for further eval. At baseline, patient has constipation (BM ~4-5 days). Currently denies nausea/vomiting/fevers.     INTERVAL EVENTS: In the ED, hemodynamically normal. Was given golytley for presumed dx of constipation, however did not improve. [facility] revealed possible large bowel volvulus. Surgery consulted.

## 2025-04-04 NOTE — ASU PREOP CHECKLIST - SELECT TESTS ORDERED
FS -/CBC/CMP/PT/PTT/INR/Type and Screen/POCT Blood Glucose FS -128 @ 8:45 am/CBC/CMP/PT/PTT/INR/Type and Screen/POCT Blood Glucose

## 2025-04-04 NOTE — ASU PREOP CHECKLIST - 2.
ASU vital signs on flow sheet ASU vital signs on flow sheet        /// Consent says Right - "No need to luda right side "as per NM - Sally Ness called -as per OR manager ASU vital signs on flow sheet        /// Consent says Right - "No need to luda right side "as per NM - Sally Serrato who called -as per OR manager

## 2025-04-04 NOTE — PATIENT PROFILE ADULT - FALL HARM RISK - HARM RISK INTERVENTIONS

## 2025-04-04 NOTE — CONSULT NOTE ADULT - ASSESSMENT
Assessment: 64F h/o remote DCIS status post mastectomy presents with abdominal pain and distension x1d i/s/o chronic constipation, with CT concerning for possible cecal volvulus.     Recommendations:   - Currently pending finalized CT A/P read by radiology   - NPO / IVF / serial abdominal exams     Final recs pending imaging     Discussed with fellow, Dr. Castillo    Please contact A Team Surgery (p. [phone]) with any questions.     Stephany Hernandez, DO PhD  A Team Surgery  Pager [phone]

## 2025-04-04 NOTE — H&P ADULT - NSHPPHYSICALEXAM_GEN_ALL_CORE
T(C): 36.4 (04-04-25 @ 06:15), Max: 36.7 (04-03-25 @ 21:45)  HR: 87 (04-04-25 @ 06:15) (77 - 92)  BP: 129/82 (04-04-25 @ 06:15) (124/75 - 130/74)  RR: 16 (04-04-25 @ 06:15) (16 - 18)  SpO2: 100% (04-04-25 @ 06:15) (98% - 100%)    General: NAD, resting comfortably   CV: regular rate and rhythm   Pulm: nonlabored respirations  Abdomen: soft, nontender, softly distended, no rebound or guarding    Extremities: warm and well perfused

## 2025-04-05 LAB
ANION GAP SERPL CALC-SCNC: 14 MMOL/L — SIGNIFICANT CHANGE UP (ref 7–14)
BUN SERPL-MCNC: 15 MG/DL — SIGNIFICANT CHANGE UP (ref 7–23)
CALCIUM SERPL-MCNC: 8.4 MG/DL — SIGNIFICANT CHANGE UP (ref 8.4–10.5)
CHLORIDE SERPL-SCNC: 101 MMOL/L — SIGNIFICANT CHANGE UP (ref 98–107)
CO2 SERPL-SCNC: 22 MMOL/L — SIGNIFICANT CHANGE UP (ref 22–31)
CREAT SERPL-MCNC: 0.67 MG/DL — SIGNIFICANT CHANGE UP (ref 0.5–1.3)
EGFR: 98 ML/MIN/1.73M2 — SIGNIFICANT CHANGE UP
EGFR: 98 ML/MIN/1.73M2 — SIGNIFICANT CHANGE UP
GLUCOSE SERPL-MCNC: 102 MG/DL — HIGH (ref 70–99)
HCT VFR BLD CALC: 39 % — SIGNIFICANT CHANGE UP (ref 34.5–45)
HGB BLD-MCNC: 13.2 G/DL — SIGNIFICANT CHANGE UP (ref 11.5–15.5)
MAGNESIUM SERPL-MCNC: 1.9 MG/DL — SIGNIFICANT CHANGE UP (ref 1.6–2.6)
MCHC RBC-ENTMCNC: 28.9 PG — SIGNIFICANT CHANGE UP (ref 27–34)
MCHC RBC-ENTMCNC: 33.8 G/DL — SIGNIFICANT CHANGE UP (ref 32–36)
MCV RBC AUTO: 85.5 FL — SIGNIFICANT CHANGE UP (ref 80–100)
NRBC # BLD AUTO: 0 K/UL — SIGNIFICANT CHANGE UP (ref 0–0)
NRBC # FLD: 0 K/UL — SIGNIFICANT CHANGE UP (ref 0–0)
NRBC BLD AUTO-RTO: 0 /100 WBCS — SIGNIFICANT CHANGE UP (ref 0–0)
PHOSPHATE SERPL-MCNC: 3 MG/DL — SIGNIFICANT CHANGE UP (ref 2.5–4.5)
PLATELET # BLD AUTO: 234 K/UL — SIGNIFICANT CHANGE UP (ref 150–400)
POTASSIUM SERPL-MCNC: 3.5 MMOL/L — SIGNIFICANT CHANGE UP (ref 3.5–5.3)
POTASSIUM SERPL-SCNC: 3.5 MMOL/L — SIGNIFICANT CHANGE UP (ref 3.5–5.3)
RBC # BLD: 4.56 M/UL — SIGNIFICANT CHANGE UP (ref 3.8–5.2)
RBC # FLD: 13.3 % — SIGNIFICANT CHANGE UP (ref 10.3–14.5)
SODIUM SERPL-SCNC: 137 MMOL/L — SIGNIFICANT CHANGE UP (ref 135–145)
WBC # BLD: 11.63 K/UL — HIGH (ref 3.8–10.5)
WBC # FLD AUTO: 11.63 K/UL — HIGH (ref 3.8–10.5)

## 2025-04-05 RX ORDER — ACETAMINOPHEN 500 MG/5ML
1000 LIQUID (ML) ORAL EVERY 6 HOURS
Refills: 0 | Status: COMPLETED | OUTPATIENT
Start: 2025-04-05 | End: 2025-04-06

## 2025-04-05 RX ORDER — CALCIUM CARBONATE 750 MG/1
1 TABLET ORAL ONCE
Refills: 0 | Status: COMPLETED | OUTPATIENT
Start: 2025-04-05 | End: 2025-04-05

## 2025-04-05 RX ORDER — KETOROLAC TROMETHAMINE 30 MG/ML
15 INJECTION, SOLUTION INTRAMUSCULAR; INTRAVENOUS EVERY 6 HOURS
Refills: 0 | Status: DISCONTINUED | OUTPATIENT
Start: 2025-04-05 | End: 2025-04-06

## 2025-04-05 RX ORDER — LIDOCAINE HYDROCHLORIDE 20 MG/ML
1 JELLY TOPICAL ONCE
Refills: 0 | Status: COMPLETED | OUTPATIENT
Start: 2025-04-05 | End: 2025-04-05

## 2025-04-05 RX ORDER — MAGNESIUM SULFATE 500 MG/ML
1 SYRINGE (ML) INJECTION ONCE
Refills: 0 | Status: COMPLETED | OUTPATIENT
Start: 2025-04-05 | End: 2025-04-05

## 2025-04-05 RX ADMIN — Medication 400 MILLIGRAM(S): at 21:02

## 2025-04-05 RX ADMIN — Medication 1000 MILLIGRAM(S): at 10:30

## 2025-04-05 RX ADMIN — LIDOCAINE HYDROCHLORIDE 1 PATCH: 20 JELLY TOPICAL at 11:27

## 2025-04-05 RX ADMIN — LIDOCAINE HYDROCHLORIDE 1 PATCH: 20 JELLY TOPICAL at 19:48

## 2025-04-05 RX ADMIN — SODIUM CHLORIDE 100 MILLILITER(S): 9 INJECTION, SOLUTION INTRAVENOUS at 00:11

## 2025-04-05 RX ADMIN — Medication 400 MILLIGRAM(S): at 15:54

## 2025-04-05 RX ADMIN — Medication 1000 MILLIGRAM(S): at 04:45

## 2025-04-05 RX ADMIN — Medication 1000 MILLIGRAM(S): at 22:02

## 2025-04-05 RX ADMIN — KETOROLAC TROMETHAMINE 15 MILLIGRAM(S): 30 INJECTION, SOLUTION INTRAMUSCULAR; INTRAVENOUS at 17:30

## 2025-04-05 RX ADMIN — HEPARIN SODIUM 5000 UNIT(S): 1000 INJECTION INTRAVENOUS; SUBCUTANEOUS at 15:55

## 2025-04-05 RX ADMIN — Medication 400 MILLIGRAM(S): at 03:45

## 2025-04-05 RX ADMIN — Medication 400 MILLIGRAM(S): at 10:09

## 2025-04-05 RX ADMIN — HEPARIN SODIUM 5000 UNIT(S): 1000 INJECTION INTRAVENOUS; SUBCUTANEOUS at 00:11

## 2025-04-05 RX ADMIN — Medication 40 MILLIEQUIVALENT(S): at 10:30

## 2025-04-05 RX ADMIN — HEPARIN SODIUM 5000 UNIT(S): 1000 INJECTION INTRAVENOUS; SUBCUTANEOUS at 08:38

## 2025-04-05 RX ADMIN — CALCIUM CARBONATE 1 TABLET(S): 750 TABLET ORAL at 21:13

## 2025-04-05 RX ADMIN — Medication 100 GRAM(S): at 10:29

## 2025-04-05 RX ADMIN — SODIUM CHLORIDE 75 MILLILITER(S): 9 INJECTION, SOLUTION INTRAVENOUS at 21:02

## 2025-04-05 RX ADMIN — Medication 1000 MILLIGRAM(S): at 16:25

## 2025-04-05 RX ADMIN — SODIUM CHLORIDE 100 MILLILITER(S): 9 INJECTION, SOLUTION INTRAVENOUS at 03:43

## 2025-04-05 RX ADMIN — KETOROLAC TROMETHAMINE 15 MILLIGRAM(S): 30 INJECTION, SOLUTION INTRAMUSCULAR; INTRAVENOUS at 17:12

## 2025-04-05 NOTE — PROGRESS NOTE ADULT - SUBJECTIVE AND OBJECTIVE BOX
24 Hour Events:  - NAOE    SUBJECTIVE: Pt seen at bedside during AM rounds. No o/n events, patient resting comfortably.    Vital Signs Last 24 Hrs  T(C): 37.7 (05 Apr 2025 05:30), Max: 37.7 (05 Apr 2025 05:30)  T(F): 99.8 (05 Apr 2025 05:30), Max: 99.8 (05 Apr 2025 05:30)  HR: 98 (05 Apr 2025 05:30) (89 - 104)  BP: 127/47 (05 Apr 2025 05:30) (116/59 - 155/73)  BP(mean): 92 (04 Apr 2025 13:00) (86 - 98)  RR: 18 (05 Apr 2025 05:30) (12 - 20)  SpO2: 98% (05 Apr 2025 05:30) (96% - 100%)    Parameters below as of 05 Apr 2025 05:30  Patient On (Oxygen Delivery Method): room air        I&O's Summary    04 Apr 2025 07:01  -  05 Apr 2025 07:00  --------------------------------------------------------  IN: 1450 mL / OUT: 2335 mL / NET: -885 mL        LABS:                        13.2   11.63 )-----------( 234      ( 05 Apr 2025 05:12 )             39.0     04-05    137  |  101  |  15  ----------------------------<  102[H]  3.5   |  22  |  0.67    Ca    8.4      05 Apr 2025 05:12  Phos  3.0     04-05  Mg     1.90     04-05    TPro  6.9  /  Alb  4.2  /  TBili  1.3[H]  /  DBili  x   /  AST  23  /  ALT  23  /  AlkPhos  71  04-03    PT/INR - ( 04 Apr 2025 07:16 )   PT: 13.9 sec;   INR: 1.17 ratio         PTT - ( 04 Apr 2025 07:16 )  PTT:28.6 sec  Urinalysis Basic - ( 05 Apr 2025 05:12 )    Color: x / Appearance: x / SG: x / pH: x  Gluc: 102 mg/dL / Ketone: x  / Bili: x / Urobili: x   Blood: x / Protein: x / Nitrite: x   Leuk Esterase: x / RBC: x / WBC x   Sq Epi: x / Non Sq Epi: x / Bacteria: x        Physical Exam:  General Appearance: Appears well, NAD  Neck: Supple  Chest: Equal expansion bilaterally, equal breath sounds  CV: Pulse present  Abdomen: Soft, nontender, appropriate incisional tenderness, lower laparotomy staple line intact, hossein in place. Gauze and tape changed  Extremities: Grossly symmetric, SCD's in place

## 2025-04-06 LAB
ANION GAP SERPL CALC-SCNC: 15 MMOL/L — HIGH (ref 7–14)
ANION GAP SERPL CALC-SCNC: 20 MMOL/L — HIGH (ref 7–14)
BUN SERPL-MCNC: 14 MG/DL — SIGNIFICANT CHANGE UP (ref 7–23)
BUN SERPL-MCNC: 18 MG/DL — SIGNIFICANT CHANGE UP (ref 7–23)
CALCIUM SERPL-MCNC: 8.7 MG/DL — SIGNIFICANT CHANGE UP (ref 8.4–10.5)
CALCIUM SERPL-MCNC: 9.3 MG/DL — SIGNIFICANT CHANGE UP (ref 8.4–10.5)
CHLORIDE SERPL-SCNC: 100 MMOL/L — SIGNIFICANT CHANGE UP (ref 98–107)
CHLORIDE SERPL-SCNC: 98 MMOL/L — SIGNIFICANT CHANGE UP (ref 98–107)
CO2 SERPL-SCNC: 20 MMOL/L — LOW (ref 22–31)
CO2 SERPL-SCNC: 22 MMOL/L — SIGNIFICANT CHANGE UP (ref 22–31)
CREAT SERPL-MCNC: 0.58 MG/DL — SIGNIFICANT CHANGE UP (ref 0.5–1.3)
CREAT SERPL-MCNC: 0.59 MG/DL — SIGNIFICANT CHANGE UP (ref 0.5–1.3)
EGFR: 101 ML/MIN/1.73M2 — SIGNIFICANT CHANGE UP
GLUCOSE SERPL-MCNC: 107 MG/DL — HIGH (ref 70–99)
GLUCOSE SERPL-MCNC: 125 MG/DL — HIGH (ref 70–99)
HCT VFR BLD CALC: 36.1 % — SIGNIFICANT CHANGE UP (ref 34.5–45)
HGB BLD-MCNC: 12.2 G/DL — SIGNIFICANT CHANGE UP (ref 11.5–15.5)
MAGNESIUM SERPL-MCNC: 2.2 MG/DL — SIGNIFICANT CHANGE UP (ref 1.6–2.6)
MAGNESIUM SERPL-MCNC: 2.2 MG/DL — SIGNIFICANT CHANGE UP (ref 1.6–2.6)
MCHC RBC-ENTMCNC: 28.9 PG — SIGNIFICANT CHANGE UP (ref 27–34)
MCHC RBC-ENTMCNC: 33.8 G/DL — SIGNIFICANT CHANGE UP (ref 32–36)
MCV RBC AUTO: 85.5 FL — SIGNIFICANT CHANGE UP (ref 80–100)
NRBC # BLD AUTO: 0 K/UL — SIGNIFICANT CHANGE UP (ref 0–0)
NRBC # FLD: 0 K/UL — SIGNIFICANT CHANGE UP (ref 0–0)
NRBC BLD AUTO-RTO: 0 /100 WBCS — SIGNIFICANT CHANGE UP (ref 0–0)
PHOSPHATE SERPL-MCNC: 1.6 MG/DL — LOW (ref 2.5–4.5)
PHOSPHATE SERPL-MCNC: 4.2 MG/DL — SIGNIFICANT CHANGE UP (ref 2.5–4.5)
PLATELET # BLD AUTO: 230 K/UL — SIGNIFICANT CHANGE UP (ref 150–400)
POTASSIUM SERPL-MCNC: 3.5 MMOL/L — SIGNIFICANT CHANGE UP (ref 3.5–5.3)
POTASSIUM SERPL-MCNC: 3.5 MMOL/L — SIGNIFICANT CHANGE UP (ref 3.5–5.3)
POTASSIUM SERPL-SCNC: 3.5 MMOL/L — SIGNIFICANT CHANGE UP (ref 3.5–5.3)
POTASSIUM SERPL-SCNC: 3.5 MMOL/L — SIGNIFICANT CHANGE UP (ref 3.5–5.3)
RBC # BLD: 4.22 M/UL — SIGNIFICANT CHANGE UP (ref 3.8–5.2)
RBC # FLD: 13.2 % — SIGNIFICANT CHANGE UP (ref 10.3–14.5)
SODIUM SERPL-SCNC: 135 MMOL/L — SIGNIFICANT CHANGE UP (ref 135–145)
SODIUM SERPL-SCNC: 140 MMOL/L — SIGNIFICANT CHANGE UP (ref 135–145)
WBC # BLD: 12.01 K/UL — HIGH (ref 3.8–10.5)
WBC # FLD AUTO: 12.01 K/UL — HIGH (ref 3.8–10.5)

## 2025-04-06 PROCEDURE — 74018 RADEX ABDOMEN 1 VIEW: CPT | Mod: 26

## 2025-04-06 PROCEDURE — 71045 X-RAY EXAM CHEST 1 VIEW: CPT | Mod: 26,76

## 2025-04-06 RX ORDER — SOD PHOS DI, MONO/K PHOS MONO 250 MG
2 TABLET ORAL ONCE
Refills: 0 | Status: DISCONTINUED | OUTPATIENT
Start: 2025-04-06 | End: 2025-04-06

## 2025-04-06 RX ORDER — ONDANSETRON HCL/PF 4 MG/2 ML
4 VIAL (ML) INJECTION EVERY 6 HOURS
Refills: 0 | Status: DISCONTINUED | OUTPATIENT
Start: 2025-04-06 | End: 2025-04-13

## 2025-04-06 RX ORDER — METOCLOPRAMIDE HCL 10 MG
5 TABLET ORAL ONCE
Refills: 0 | Status: COMPLETED | OUTPATIENT
Start: 2025-04-06 | End: 2025-04-06

## 2025-04-06 RX ORDER — ONDANSETRON HCL/PF 4 MG/2 ML
4 VIAL (ML) INJECTION ONCE
Refills: 0 | Status: COMPLETED | OUTPATIENT
Start: 2025-04-06 | End: 2025-04-06

## 2025-04-06 RX ORDER — SODIUM CHLORIDE 9 G/1000ML
500 INJECTION, SOLUTION INTRAVENOUS ONCE
Refills: 0 | Status: COMPLETED | OUTPATIENT
Start: 2025-04-06 | End: 2025-04-06

## 2025-04-06 RX ORDER — CALCIUM CARBONATE 750 MG/1
1 TABLET ORAL THREE TIMES A DAY
Refills: 0 | Status: DISCONTINUED | OUTPATIENT
Start: 2025-04-06 | End: 2025-04-13

## 2025-04-06 RX ORDER — ACETAMINOPHEN 500 MG/5ML
1000 LIQUID (ML) ORAL EVERY 6 HOURS
Refills: 0 | Status: COMPLETED | OUTPATIENT
Start: 2025-04-06 | End: 2025-04-07

## 2025-04-06 RX ORDER — CALCIUM CARBONATE 750 MG/1
1 TABLET ORAL ONCE
Refills: 0 | Status: COMPLETED | OUTPATIENT
Start: 2025-04-06 | End: 2025-04-06

## 2025-04-06 RX ORDER — POTASSIUM PHOSPHATE, MONOBASIC POTASSIUM PHOSPHATE, DIBASIC INJECTION, 236; 224 MG/ML; MG/ML
30 SOLUTION, CONCENTRATE INTRAVENOUS ONCE
Refills: 0 | Status: COMPLETED | OUTPATIENT
Start: 2025-04-06 | End: 2025-04-06

## 2025-04-06 RX ORDER — POTASSIUM CHLORIDE, DEXTROSE MONOHYDRATE AND SODIUM CHLORIDE 150; 5; 900 MG/100ML; G/100ML; MG/100ML
1000 INJECTION, SOLUTION INTRAVENOUS
Refills: 0 | Status: DISCONTINUED | OUTPATIENT
Start: 2025-04-06 | End: 2025-04-10

## 2025-04-06 RX ADMIN — KETOROLAC TROMETHAMINE 15 MILLIGRAM(S): 30 INJECTION, SOLUTION INTRAMUSCULAR; INTRAVENOUS at 01:10

## 2025-04-06 RX ADMIN — SODIUM CHLORIDE 1000 MILLILITER(S): 9 INJECTION, SOLUTION INTRAVENOUS at 20:37

## 2025-04-06 RX ADMIN — Medication 1 SPRAY(S): at 23:32

## 2025-04-06 RX ADMIN — Medication 400 MILLIGRAM(S): at 11:46

## 2025-04-06 RX ADMIN — Medication 400 MILLIGRAM(S): at 04:29

## 2025-04-06 RX ADMIN — POTASSIUM CHLORIDE, DEXTROSE MONOHYDRATE AND SODIUM CHLORIDE 100 MILLILITER(S): 150; 5; 900 INJECTION, SOLUTION INTRAVENOUS at 20:38

## 2025-04-06 RX ADMIN — HEPARIN SODIUM 5000 UNIT(S): 1000 INJECTION INTRAVENOUS; SUBCUTANEOUS at 16:38

## 2025-04-06 RX ADMIN — CALCIUM CARBONATE 1 TABLET(S): 750 TABLET ORAL at 08:47

## 2025-04-06 RX ADMIN — POTASSIUM CHLORIDE, DEXTROSE MONOHYDRATE AND SODIUM CHLORIDE 100 MILLILITER(S): 150; 5; 900 INJECTION, SOLUTION INTRAVENOUS at 16:38

## 2025-04-06 RX ADMIN — Medication 4 MILLIGRAM(S): at 04:29

## 2025-04-06 RX ADMIN — CALCIUM CARBONATE 1 TABLET(S): 750 TABLET ORAL at 02:33

## 2025-04-06 RX ADMIN — HEPARIN SODIUM 5000 UNIT(S): 1000 INJECTION INTRAVENOUS; SUBCUTANEOUS at 00:10

## 2025-04-06 RX ADMIN — KETOROLAC TROMETHAMINE 15 MILLIGRAM(S): 30 INJECTION, SOLUTION INTRAMUSCULAR; INTRAVENOUS at 00:10

## 2025-04-06 RX ADMIN — HEPARIN SODIUM 5000 UNIT(S): 1000 INJECTION INTRAVENOUS; SUBCUTANEOUS at 23:39

## 2025-04-06 RX ADMIN — HEPARIN SODIUM 5000 UNIT(S): 1000 INJECTION INTRAVENOUS; SUBCUTANEOUS at 08:36

## 2025-04-06 RX ADMIN — Medication 4 MILLIGRAM(S): at 13:54

## 2025-04-06 RX ADMIN — Medication 400 MILLIGRAM(S): at 18:47

## 2025-04-06 RX ADMIN — SODIUM CHLORIDE 100 MILLILITER(S): 9 INJECTION, SOLUTION INTRAVENOUS at 11:46

## 2025-04-06 RX ADMIN — Medication 4 MILLIGRAM(S): at 08:25

## 2025-04-06 RX ADMIN — POTASSIUM PHOSPHATE, MONOBASIC POTASSIUM PHOSPHATE, DIBASIC INJECTION, 83.33 MILLIMOLE(S): 236; 224 SOLUTION, CONCENTRATE INTRAVENOUS at 08:26

## 2025-04-06 RX ADMIN — Medication 50 MILLIEQUIVALENT(S): at 23:36

## 2025-04-06 RX ADMIN — LIDOCAINE HYDROCHLORIDE 1 PATCH: 20 JELLY TOPICAL at 00:22

## 2025-04-06 RX ADMIN — Medication 1000 MILLIGRAM(S): at 05:29

## 2025-04-06 RX ADMIN — CALCIUM CARBONATE 1 TABLET(S): 750 TABLET ORAL at 13:52

## 2025-04-06 RX ADMIN — SODIUM CHLORIDE 75 MILLILITER(S): 9 INJECTION, SOLUTION INTRAVENOUS at 08:25

## 2025-04-06 RX ADMIN — Medication 50 MILLIEQUIVALENT(S): at 21:20

## 2025-04-06 RX ADMIN — Medication 5 MILLIGRAM(S): at 11:39

## 2025-04-06 NOTE — PROVIDER CONTACT NOTE (OTHER) - REASON
Pt vomited a few times, multiple BM's, not passing gas, still reports nausea
Post LR bolus vital signs

## 2025-04-06 NOTE — PROVIDER CONTACT NOTE (OTHER) - RECOMMENDATIONS
RN gave Reglan as ordered, Will give Zofran & TUMS now. Pt is NPO except meds now, refusing NGT at this time
Notify provider

## 2025-04-06 NOTE — PROVIDER CONTACT NOTE (OTHER) - ACTION/TREATMENT ORDERED:
MD assessed Ptr at bedside, Pt is NPO except meds, Zofran, Reglan, & Tums ordered.
Awaiting further instructions.

## 2025-04-06 NOTE — PROGRESS NOTE ADULT - SUBJECTIVE AND OBJECTIVE BOX
Surgery Daily Progress Note  =====================================================    INTERVAL EVENTS: NAEO, requesting tums overnight. Passed TOV.     SUBJECTIVE: Patient seen at bedside during AM rounds. Endorsing nausea, encouraged to OOB and ambulate. Given zofran for nausea. Allan removed and dressing changed.     --------------------------------------------------------------------------------------  VITAL SIGNS:  T(C): 36.9 (04-06-25 @ 08:44), Max: 37.4 (04-05-25 @ 20:55)  HR: 100 (04-06-25 @ 08:44) (96 - 104)  BP: 135/61 (04-06-25 @ 08:44) (121/53 - 135/61)  RR: 18 (04-06-25 @ 08:44) (16 - 19)  SpO2: 97% (04-06-25 @ 08:44) (95% - 98%)  --------------------------------------------------------------------------------------  MEDICATIONS:   acetaminophen   IVPB .. 1000 milliGRAM(s) IV Intermittent every 6 hours  calcium carbonate    500 mG (Tums) Chewable 1 Tablet(s) Chew three times a day PRN  heparin   Injectable 5000 Unit(s) SubCutaneous every 8 hours  HYDROmorphone  Injectable 0.2 milliGRAM(s) IV Push every 4 hours PRN  HYDROmorphone  Injectable 0.5 milliGRAM(s) IV Push every 4 hours PRN  ketorolac   Injectable 15 milliGRAM(s) IV Push every 6 hours  lactated ringers. 1000 milliLiter(s) IV Continuous <Continuous>    --------------------------------------------------------------------------------------  INTAKE/OUTPUT:     04-05-25 @ 07:01  -  04-06-25 @ 07:00  --------------------------------------------------------  IN: 3800 mL / OUT: 3050 mL / NET: 750 mL    04-06-25 @ 07:01  -  04-06-25 @ 08:52  --------------------------------------------------------  IN: 770 mL / OUT: 0 mL / NET: 770 mL      --------------------------------------------------------------------------------------    Physical Exam:  General Appearance: Appears well, NAD  Neck: Supple  Chest: Equal expansion bilaterally, equal breath sounds  CV: Pulse present  Abdomen: Soft, nontender, appropriate incisional tenderness, lower laparotomy staple line intact, allan removed. Gauze and tape changed  Extremities: Grossly symmetric, SCD's in place   --------------------------------------------------------------------------------------

## 2025-04-06 NOTE — PROVIDER CONTACT NOTE (OTHER) - SITUATION
Post-bolus vitals are /54, , temp 98.6, O2 98% on room air.
Pt vomited a few times, received Zofran @8am  EKG done  Pt had 3 BM's  Pt not passing gas

## 2025-04-06 NOTE — PROVIDER CONTACT NOTE (OTHER) - ASSESSMENT
Patient AOx4, denies chest pain and sob.
RN & Pt ambulated in hallway to tr to pass gas, Pt still nauseous, VSS WNL

## 2025-04-07 LAB
ANION GAP SERPL CALC-SCNC: 14 MMOL/L — SIGNIFICANT CHANGE UP (ref 7–14)
BLD GP AB SCN SERPL QL: NEGATIVE — SIGNIFICANT CHANGE UP
BUN SERPL-MCNC: 16 MG/DL — SIGNIFICANT CHANGE UP (ref 7–23)
CALCIUM SERPL-MCNC: 8.7 MG/DL — SIGNIFICANT CHANGE UP (ref 8.4–10.5)
CHLORIDE SERPL-SCNC: 99 MMOL/L — SIGNIFICANT CHANGE UP (ref 98–107)
CO2 SERPL-SCNC: 25 MMOL/L — SIGNIFICANT CHANGE UP (ref 22–31)
CREAT SERPL-MCNC: 0.62 MG/DL — SIGNIFICANT CHANGE UP (ref 0.5–1.3)
EGFR: 99 ML/MIN/1.73M2 — SIGNIFICANT CHANGE UP
EGFR: 99 ML/MIN/1.73M2 — SIGNIFICANT CHANGE UP
GLUCOSE SERPL-MCNC: 116 MG/DL — HIGH (ref 70–99)
HCT VFR BLD CALC: 34 % — LOW (ref 34.5–45)
HGB BLD-MCNC: 11.8 G/DL — SIGNIFICANT CHANGE UP (ref 11.5–15.5)
MAGNESIUM SERPL-MCNC: 2 MG/DL — SIGNIFICANT CHANGE UP (ref 1.6–2.6)
MCHC RBC-ENTMCNC: 29.4 PG — SIGNIFICANT CHANGE UP (ref 27–34)
MCHC RBC-ENTMCNC: 34.7 G/DL — SIGNIFICANT CHANGE UP (ref 32–36)
MCV RBC AUTO: 84.6 FL — SIGNIFICANT CHANGE UP (ref 80–100)
NRBC # BLD AUTO: 0 K/UL — SIGNIFICANT CHANGE UP (ref 0–0)
NRBC # FLD: 0 K/UL — SIGNIFICANT CHANGE UP (ref 0–0)
NRBC BLD AUTO-RTO: 0 /100 WBCS — SIGNIFICANT CHANGE UP (ref 0–0)
PHOSPHATE SERPL-MCNC: 2.6 MG/DL — SIGNIFICANT CHANGE UP (ref 2.5–4.5)
PLATELET # BLD AUTO: 276 K/UL — SIGNIFICANT CHANGE UP (ref 150–400)
POTASSIUM SERPL-MCNC: 3.5 MMOL/L — SIGNIFICANT CHANGE UP (ref 3.5–5.3)
POTASSIUM SERPL-SCNC: 3.5 MMOL/L — SIGNIFICANT CHANGE UP (ref 3.5–5.3)
RBC # BLD: 4.02 M/UL — SIGNIFICANT CHANGE UP (ref 3.8–5.2)
RBC # FLD: 13.1 % — SIGNIFICANT CHANGE UP (ref 10.3–14.5)
RH IG SCN BLD-IMP: POSITIVE — SIGNIFICANT CHANGE UP
SODIUM SERPL-SCNC: 138 MMOL/L — SIGNIFICANT CHANGE UP (ref 135–145)
WBC # BLD: 5.73 K/UL — SIGNIFICANT CHANGE UP (ref 3.8–10.5)
WBC # FLD AUTO: 5.73 K/UL — SIGNIFICANT CHANGE UP (ref 3.8–10.5)

## 2025-04-07 RX ORDER — SODIUM CHLORIDE 9 G/1000ML
1000 INJECTION, SOLUTION INTRAVENOUS ONCE
Refills: 0 | Status: COMPLETED | OUTPATIENT
Start: 2025-04-07 | End: 2025-04-07

## 2025-04-07 RX ORDER — ACETAMINOPHEN 500 MG/5ML
1000 LIQUID (ML) ORAL EVERY 6 HOURS
Refills: 0 | Status: COMPLETED | OUTPATIENT
Start: 2025-04-07 | End: 2025-04-08

## 2025-04-07 RX ADMIN — Medication 100 MILLIEQUIVALENT(S): at 11:01

## 2025-04-07 RX ADMIN — Medication 40 MILLIGRAM(S): at 09:27

## 2025-04-07 RX ADMIN — SODIUM CHLORIDE 2000 MILLILITER(S): 9 INJECTION, SOLUTION INTRAVENOUS at 07:06

## 2025-04-07 RX ADMIN — Medication 100 MILLIEQUIVALENT(S): at 12:19

## 2025-04-07 RX ADMIN — HEPARIN SODIUM 5000 UNIT(S): 1000 INJECTION INTRAVENOUS; SUBCUTANEOUS at 15:58

## 2025-04-07 RX ADMIN — HEPARIN SODIUM 5000 UNIT(S): 1000 INJECTION INTRAVENOUS; SUBCUTANEOUS at 07:54

## 2025-04-07 RX ADMIN — Medication 100 MILLIEQUIVALENT(S): at 09:26

## 2025-04-07 RX ADMIN — SODIUM CHLORIDE 1000 MILLILITER(S): 9 INJECTION, SOLUTION INTRAVENOUS at 01:02

## 2025-04-07 RX ADMIN — Medication 400 MILLIGRAM(S): at 07:51

## 2025-04-07 RX ADMIN — Medication 1 LOZENGE: at 11:25

## 2025-04-07 RX ADMIN — Medication 400 MILLIGRAM(S): at 14:19

## 2025-04-07 RX ADMIN — POTASSIUM CHLORIDE, DEXTROSE MONOHYDRATE AND SODIUM CHLORIDE 100 MILLILITER(S): 150; 5; 900 INJECTION, SOLUTION INTRAVENOUS at 09:26

## 2025-04-07 RX ADMIN — Medication 400 MILLIGRAM(S): at 20:20

## 2025-04-07 RX ADMIN — POTASSIUM CHLORIDE, DEXTROSE MONOHYDRATE AND SODIUM CHLORIDE 100 MILLILITER(S): 150; 5; 900 INJECTION, SOLUTION INTRAVENOUS at 23:59

## 2025-04-07 RX ADMIN — POTASSIUM CHLORIDE, DEXTROSE MONOHYDRATE AND SODIUM CHLORIDE 100 MILLILITER(S): 150; 5; 900 INJECTION, SOLUTION INTRAVENOUS at 14:48

## 2025-04-07 NOTE — PHYSICAL THERAPY INITIAL EVALUATION ADULT - PERTINENT HX OF CURRENT PROBLEM, REHAB EVAL
Patient is a 64 year old female admitted to [facility] for abdominal pain and distension. CT scan revealed cecal volvulus with small bowel obstruction. Patient is now status post exploratory laparotomy and right hemicolectomy performed with ileotransverse colon anastamosis. Recent x-ray results showed multiple dilated loops of small bowel consistent with ileus, but improved.

## 2025-04-07 NOTE — PROGRESS NOTE ADULT - SUBJECTIVE AND OBJECTIVE BOX
TEAM [ A ] Surgery Daily Progress Note  =====================================================    SUBJECTIVE: Patient seen and examined at bedside on AM rounds. Patient reports feeling better now than yesterday, but NGT is bothering her. NPO, no additional N/V since NGT placement. - Flatus/+BM. OOB/Amublating as tolerated.     ALLERGIES:  penicillin (Unknown)  sulfa drugs (Unknown)  morphine (Other)      --------------------------------------------------------------------------------------    MEDICATIONS:    Neurologic Medications  acetaminophen   IVPB .. 1000 milliGRAM(s) IV Intermittent every 6 hours  acetaminophen   IVPB .. 1000 milliGRAM(s) IV Intermittent every 6 hours  HYDROmorphone  Injectable 0.2 milliGRAM(s) IV Push every 4 hours PRN Moderate Pain (4 - 6)  HYDROmorphone  Injectable 0.5 milliGRAM(s) IV Push every 4 hours PRN Severe Pain (7 - 10)  ondansetron Injectable 4 milliGRAM(s) IV Push every 6 hours PRN Nausea and/or Vomiting    Respiratory Medications    Cardiovascular Medications    Gastrointestinal Medications  calcium carbonate    500 mG (Tums) Chewable 1 Tablet(s) Chew three times a day PRN Heartburn  dextrose 5% + sodium chloride 0.45% with potassium chloride 20 mEq/L 1000 milliLiter(s) IV Continuous <Continuous>  pantoprazole  Injectable 40 milliGRAM(s) IV Push every 24 hours    Genitourinary Medications    Hematologic/Oncologic Medications  heparin   Injectable 5000 Unit(s) SubCutaneous every 8 hours    Antimicrobial/Immunologic Medications    Endocrine/Metabolic Medications    Topical/Other Medications  benzocaine/butamben/tetracaine Spray 1 Spray(s) Topical three times a day PRN ngt discomfort  benzocaine/menthol Lozenge 1 Lozenge Oral three times a day PRN ngt discomfort    --------------------------------------------------------------------------------------    VITAL SIGNS:  T(C): 36.8 (04-07-25 @ 05:26), Max: 37.3 (04-06-25 @ 12:45)  HR: 107 (04-07-25 @ 05:26) (100 - 110)  BP: 142/57 (04-07-25 @ 05:26) (135/57 - 148/56)  RR: 17 (04-07-25 @ 05:26) (16 - 18)  SpO2: 96% (04-07-25 @ 05:26) (96% - 98%)  --------------------------------------------------------------------------------------    EXAM    General: NAD, resting in bed comfortably.  Cardiac: regular rate, warm and well perfused  Respiratory: Nonlabored respirations, normal cw expansion.  Abdomen: soft, mildly distended, NT. Midline incision c/d/i, dressing changed. NGT to suction, flushed.  Extremities: normal strength, FROM, no deformities    --------------------------------------------------------------------------------------    LABS                        11.8   5.73  )-----------( 276      ( 07 Apr 2025 04:04 )             34.0   04-07    138  |  99  |  16  ----------------------------<  116[H]  3.5   |  25  |  0.62    Ca    8.7      07 Apr 2025 04:04  Phos  2.6     04-07  Mg     2.00     04-07      --------------------------------------------------------------------------------------    INS AND OUTS:    04-06-25 @ 07:01  -  04-07-25 @ 07:00  --------------------------------------------------------  IN: 3695 mL / OUT: 4700 mL / NET: -1005 mL      --------------------------------------------------------------------------------------

## 2025-04-07 NOTE — PHYSICAL THERAPY INITIAL EVALUATION ADULT - NSPTDISCHREC_GEN_A_CORE
Home with no skilled PT needs. Patient is functionally independent. Will take off inpatient PT program./No skilled PT needs

## 2025-04-07 NOTE — PHYSICAL THERAPY INITIAL EVALUATION ADULT - GAIT PATTERN USED, PT EVAL
Patient asymptomatic with no gait deviations./swing-through gait Patient with no gait deviations./swing-through gait

## 2025-04-07 NOTE — PHYSICAL THERAPY INITIAL EVALUATION ADULT - DID THE PATIENT HAVE SURGERY?
Exploratory laparotomy and right hemicolectomy performed with ileotransverse colon anastamosis in side to side fashion with Aaron technique/yes

## 2025-04-07 NOTE — PHYSICAL THERAPY INITIAL EVALUATION ADULT - ADDITIONAL COMMENTS
Patient lives with her  and child in a private home with 3 steps to enter and 12 steps to her bedroom. Prior to admission, patient was independently performing all functional and daily activities with no assistive device. Patient is currently still working as a businessowner.    Patient left on toilet in NAD. All lines intact. LPN present and states she will return patient to bed.

## 2025-04-07 NOTE — PHYSICAL THERAPY INITIAL EVALUATION ADULT - GENERAL OBSERVATIONS, REHAB EVAL
Patient found semi-reclined in bed in NAD. A&Ox4, +NG tube, +IV. OK for PT per RN Patricia. Patient agreeable to PT evaluation. Resting /75 mmHg.

## 2025-04-08 LAB
ANION GAP SERPL CALC-SCNC: 11 MMOL/L — SIGNIFICANT CHANGE UP (ref 7–14)
BUN SERPL-MCNC: 8 MG/DL — SIGNIFICANT CHANGE UP (ref 7–23)
CALCIUM SERPL-MCNC: 8.4 MG/DL — SIGNIFICANT CHANGE UP (ref 8.4–10.5)
CHLORIDE SERPL-SCNC: 103 MMOL/L — SIGNIFICANT CHANGE UP (ref 98–107)
CO2 SERPL-SCNC: 21 MMOL/L — LOW (ref 22–31)
CREAT SERPL-MCNC: 0.56 MG/DL — SIGNIFICANT CHANGE UP (ref 0.5–1.3)
EGFR: 102 ML/MIN/1.73M2 — SIGNIFICANT CHANGE UP
EGFR: 102 ML/MIN/1.73M2 — SIGNIFICANT CHANGE UP
GLUCOSE SERPL-MCNC: 110 MG/DL — HIGH (ref 70–99)
HCT VFR BLD CALC: 35.5 % — SIGNIFICANT CHANGE UP (ref 34.5–45)
HGB BLD-MCNC: 11.8 G/DL — SIGNIFICANT CHANGE UP (ref 11.5–15.5)
MAGNESIUM SERPL-MCNC: 1.9 MG/DL — SIGNIFICANT CHANGE UP (ref 1.6–2.6)
MCHC RBC-ENTMCNC: 29.1 PG — SIGNIFICANT CHANGE UP (ref 27–34)
MCHC RBC-ENTMCNC: 33.2 G/DL — SIGNIFICANT CHANGE UP (ref 32–36)
MCV RBC AUTO: 87.7 FL — SIGNIFICANT CHANGE UP (ref 80–100)
NRBC # BLD AUTO: 0 K/UL — SIGNIFICANT CHANGE UP (ref 0–0)
NRBC # FLD: 0 K/UL — SIGNIFICANT CHANGE UP (ref 0–0)
NRBC BLD AUTO-RTO: 0 /100 WBCS — SIGNIFICANT CHANGE UP (ref 0–0)
PHOSPHATE SERPL-MCNC: 2.1 MG/DL — LOW (ref 2.5–4.5)
PLATELET # BLD AUTO: 266 K/UL — SIGNIFICANT CHANGE UP (ref 150–400)
POTASSIUM SERPL-MCNC: 3.7 MMOL/L — SIGNIFICANT CHANGE UP (ref 3.5–5.3)
POTASSIUM SERPL-SCNC: 3.7 MMOL/L — SIGNIFICANT CHANGE UP (ref 3.5–5.3)
RBC # BLD: 4.05 M/UL — SIGNIFICANT CHANGE UP (ref 3.8–5.2)
RBC # FLD: 13.2 % — SIGNIFICANT CHANGE UP (ref 10.3–14.5)
SODIUM SERPL-SCNC: 135 MMOL/L — SIGNIFICANT CHANGE UP (ref 135–145)
WBC # BLD: 4.35 K/UL — SIGNIFICANT CHANGE UP (ref 3.8–10.5)
WBC # FLD AUTO: 4.35 K/UL — SIGNIFICANT CHANGE UP (ref 3.8–10.5)

## 2025-04-08 RX ORDER — ACETAMINOPHEN 500 MG/5ML
1000 LIQUID (ML) ORAL EVERY 6 HOURS
Refills: 0 | Status: COMPLETED | OUTPATIENT
Start: 2025-04-08 | End: 2025-04-09

## 2025-04-08 RX ORDER — MAGNESIUM SULFATE 500 MG/ML
1 SYRINGE (ML) INJECTION ONCE
Refills: 0 | Status: COMPLETED | OUTPATIENT
Start: 2025-04-08 | End: 2025-04-08

## 2025-04-08 RX ADMIN — Medication 100 GRAM(S): at 10:01

## 2025-04-08 RX ADMIN — Medication 100 MILLIEQUIVALENT(S): at 13:50

## 2025-04-08 RX ADMIN — Medication 1000 MILLIGRAM(S): at 09:30

## 2025-04-08 RX ADMIN — POTASSIUM CHLORIDE, DEXTROSE MONOHYDRATE AND SODIUM CHLORIDE 100 MILLILITER(S): 150; 5; 900 INJECTION, SOLUTION INTRAVENOUS at 10:01

## 2025-04-08 RX ADMIN — Medication 40 MILLIGRAM(S): at 08:50

## 2025-04-08 RX ADMIN — HEPARIN SODIUM 5000 UNIT(S): 1000 INJECTION INTRAVENOUS; SUBCUTANEOUS at 08:50

## 2025-04-08 RX ADMIN — Medication 100 MILLIEQUIVALENT(S): at 08:50

## 2025-04-08 RX ADMIN — HEPARIN SODIUM 5000 UNIT(S): 1000 INJECTION INTRAVENOUS; SUBCUTANEOUS at 15:35

## 2025-04-08 RX ADMIN — Medication 1000 MILLIGRAM(S): at 16:00

## 2025-04-08 RX ADMIN — Medication 400 MILLIGRAM(S): at 09:06

## 2025-04-08 RX ADMIN — Medication 63.75 MILLIMOLE(S): at 11:21

## 2025-04-08 RX ADMIN — Medication 1 LOZENGE: at 08:49

## 2025-04-08 RX ADMIN — POTASSIUM CHLORIDE, DEXTROSE MONOHYDRATE AND SODIUM CHLORIDE 100 MILLILITER(S): 150; 5; 900 INJECTION, SOLUTION INTRAVENOUS at 21:25

## 2025-04-08 RX ADMIN — Medication 1000 MILLIGRAM(S): at 03:34

## 2025-04-08 RX ADMIN — Medication 100 MILLIEQUIVALENT(S): at 11:22

## 2025-04-08 RX ADMIN — Medication 400 MILLIGRAM(S): at 15:35

## 2025-04-08 RX ADMIN — HEPARIN SODIUM 5000 UNIT(S): 1000 INJECTION INTRAVENOUS; SUBCUTANEOUS at 00:03

## 2025-04-08 RX ADMIN — CALCIUM CARBONATE 1 TABLET(S): 750 TABLET ORAL at 19:41

## 2025-04-08 RX ADMIN — Medication 1000 MILLIGRAM(S): at 22:54

## 2025-04-08 RX ADMIN — Medication 400 MILLIGRAM(S): at 21:24

## 2025-04-08 RX ADMIN — Medication 400 MILLIGRAM(S): at 02:34

## 2025-04-08 NOTE — PROGRESS NOTE ADULT - SUBJECTIVE AND OBJECTIVE BOX
TEAM [ A ] Surgery Daily Progress Note  =====================================================    SUBJECTIVE: Patient seen and examined at bedside. Patient resting comfortably in bed. Patient denies N/V, has been passing flatus and has been OOB. Denies any other complaints at this time    ALLERGIES:  penicillin (Unknown)  sulfa drugs (Unknown)  morphine (Other)  --------------------------------------------------------------------------------------    VITAL SIGNS:    T(C): 36.4 (04-08-25 @ 04:15), Max: 36.8 (04-07-25 @ 13:20)  HR: 84 (04-08-25 @ 04:15) (84 - 94)  BP: 134/70 (04-08-25 @ 04:15) (128/70 - 137/72)  BP(mean): --  ABP: --  ABP(mean): --  RR: 17 (04-08-25 @ 04:15) (17 - 17)  SpO2: 99% (04-08-25 @ 04:15) (98% - 99%)  Wt(kg): --  CVP(mm Hg): --  CI: --  CAPILLARY BLOOD GLUCOSE       N/A      04-07 @ 07:01  -  04-08 @ 07:00  --------------------------------------------------------  IN:    dextrose 5% + sodium chloride 0.45% w/ Additives: 2200 mL    IV PiggyBack: 350 mL  Total IN: 2550 mL    OUT:    Emesis (mL): 0 mL    Nasogastric/Oral tube (mL): 1050 mL    Oral Fluid: 0 mL    Voided (mL): 2850 mL  Total OUT: 3900 mL    Total NET: -1350 mL    --------------------------------------------------------------------------------------    EXAM    General: NAD, resting in bed comfortably.  Cardiac: regular rate, warm and well perfused  Respiratory: Nonlabored respirations, normal cw expansion.  Abdomen: soft, less distended, NT. Midline incision c/d/i, dressing changed. NGT clamped  Extremities: normal strength, FROM, no deformities    --------------------------------------------------------------------------------------    LABS  CBC (04-08 @ 04:21)                              11.8                           4.35    )----------------(  266        --    % Neutrophils, --    % Lymphocytes, ANC: --                                  35.5    CBC (04-07 @ 04:04)                              11.8                           5.73    )----------------(  276        --    % Neutrophils, --    % Lymphocytes, ANC: --                                  34.0[L]    BMP (04-08 @ 04:21)             135     |  103     |  8     		Ca++ --      Ca 8.4                ---------------------------------( 110[H]		Mg 1.90               3.7     |  21[L]   |  0.56  			Ph 2.1[L]  BMP (04-07 @ 04:04)             138     |  99      |  16    		Ca++ --      Ca 8.7                ---------------------------------( 116[H]		Mg 2.00               3.5     |  25      |  0.62  			Ph 2.6       Urinalysis (04-08 @ 04:21):     Color:  / Appearance:  / SG:  / pH:  / Gluc: 110[H] / Ketones:  / Bili:  / Urobili:  / Protein : / Nitrites:  / Leuk.Est:  / RBC:  / WBC:  / Sq Epi:  / Non Sq Epi:  / Bacteria        --------------------------------------------------------------------------------------      Assessment and Plan:   · Assessment	  64F with 4 months of chronic constipation presented with acute on chronic abdominal pain/distention, 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 Postoperatively developed N/V likely 2/2 ileus. NGT placed with 3L output in 24hr.    P:  - VTE PPx: SQH, SCDs  - Diet: NPO/NGT - clamped on am rounds for 4hours  - IVFs at 100  - Pain: IV APAP, Dilaudid PRN  - No home meds   - ARBF, continuing to encourage OOB  - shower per routine    A team  [id number].

## 2025-04-09 LAB
ANION GAP SERPL CALC-SCNC: 12 MMOL/L — SIGNIFICANT CHANGE UP (ref 7–14)
BUN SERPL-MCNC: 7 MG/DL — SIGNIFICANT CHANGE UP (ref 7–23)
CALCIUM SERPL-MCNC: 8.6 MG/DL — SIGNIFICANT CHANGE UP (ref 8.4–10.5)
CHLORIDE SERPL-SCNC: 103 MMOL/L — SIGNIFICANT CHANGE UP (ref 98–107)
CO2 SERPL-SCNC: 22 MMOL/L — SIGNIFICANT CHANGE UP (ref 22–31)
CREAT SERPL-MCNC: 0.58 MG/DL — SIGNIFICANT CHANGE UP (ref 0.5–1.3)
EGFR: 101 ML/MIN/1.73M2 — SIGNIFICANT CHANGE UP
EGFR: 101 ML/MIN/1.73M2 — SIGNIFICANT CHANGE UP
GLUCOSE SERPL-MCNC: 106 MG/DL — HIGH (ref 70–99)
HCT VFR BLD CALC: 38.6 % — SIGNIFICANT CHANGE UP (ref 34.5–45)
HGB BLD-MCNC: 13 G/DL — SIGNIFICANT CHANGE UP (ref 11.5–15.5)
MAGNESIUM SERPL-MCNC: 2 MG/DL — SIGNIFICANT CHANGE UP (ref 1.6–2.6)
MCHC RBC-ENTMCNC: 29.3 PG — SIGNIFICANT CHANGE UP (ref 27–34)
MCHC RBC-ENTMCNC: 33.7 G/DL — SIGNIFICANT CHANGE UP (ref 32–36)
MCV RBC AUTO: 87.1 FL — SIGNIFICANT CHANGE UP (ref 80–100)
NRBC # BLD AUTO: 0 K/UL — SIGNIFICANT CHANGE UP (ref 0–0)
NRBC # FLD: 0 K/UL — SIGNIFICANT CHANGE UP (ref 0–0)
NRBC BLD AUTO-RTO: 0 /100 WBCS — SIGNIFICANT CHANGE UP (ref 0–0)
PHOSPHATE SERPL-MCNC: 3.8 MG/DL — SIGNIFICANT CHANGE UP (ref 2.5–4.5)
PLATELET # BLD AUTO: 286 K/UL — SIGNIFICANT CHANGE UP (ref 150–400)
POTASSIUM SERPL-MCNC: 3.8 MMOL/L — SIGNIFICANT CHANGE UP (ref 3.5–5.3)
POTASSIUM SERPL-SCNC: 3.8 MMOL/L — SIGNIFICANT CHANGE UP (ref 3.5–5.3)
RBC # BLD: 4.43 M/UL — SIGNIFICANT CHANGE UP (ref 3.8–5.2)
RBC # FLD: 13 % — SIGNIFICANT CHANGE UP (ref 10.3–14.5)
SODIUM SERPL-SCNC: 137 MMOL/L — SIGNIFICANT CHANGE UP (ref 135–145)
WBC # BLD: 6.11 K/UL — SIGNIFICANT CHANGE UP (ref 3.8–10.5)
WBC # FLD AUTO: 6.11 K/UL — SIGNIFICANT CHANGE UP (ref 3.8–10.5)

## 2025-04-09 RX ORDER — ACETAMINOPHEN 500 MG/5ML
1000 LIQUID (ML) ORAL EVERY 6 HOURS
Refills: 0 | Status: COMPLETED | OUTPATIENT
Start: 2025-04-09 | End: 2025-04-10

## 2025-04-09 RX ADMIN — HEPARIN SODIUM 5000 UNIT(S): 1000 INJECTION INTRAVENOUS; SUBCUTANEOUS at 15:55

## 2025-04-09 RX ADMIN — Medication 1000 MILLIGRAM(S): at 16:25

## 2025-04-09 RX ADMIN — CALCIUM CARBONATE 1 TABLET(S): 750 TABLET ORAL at 15:55

## 2025-04-09 RX ADMIN — Medication 1000 MILLIGRAM(S): at 04:54

## 2025-04-09 RX ADMIN — HEPARIN SODIUM 5000 UNIT(S): 1000 INJECTION INTRAVENOUS; SUBCUTANEOUS at 08:56

## 2025-04-09 RX ADMIN — POTASSIUM CHLORIDE, DEXTROSE MONOHYDRATE AND SODIUM CHLORIDE 100 MILLILITER(S): 150; 5; 900 INJECTION, SOLUTION INTRAVENOUS at 03:54

## 2025-04-09 RX ADMIN — POTASSIUM CHLORIDE, DEXTROSE MONOHYDRATE AND SODIUM CHLORIDE 100 MILLILITER(S): 150; 5; 900 INJECTION, SOLUTION INTRAVENOUS at 15:16

## 2025-04-09 RX ADMIN — HEPARIN SODIUM 5000 UNIT(S): 1000 INJECTION INTRAVENOUS; SUBCUTANEOUS at 00:42

## 2025-04-09 RX ADMIN — POTASSIUM CHLORIDE, DEXTROSE MONOHYDRATE AND SODIUM CHLORIDE 100 MILLILITER(S): 150; 5; 900 INJECTION, SOLUTION INTRAVENOUS at 09:51

## 2025-04-09 RX ADMIN — Medication 400 MILLIGRAM(S): at 09:51

## 2025-04-09 RX ADMIN — Medication 1000 MILLIGRAM(S): at 21:00

## 2025-04-09 RX ADMIN — Medication 400 MILLIGRAM(S): at 03:54

## 2025-04-09 RX ADMIN — Medication 1000 MILLIGRAM(S): at 10:21

## 2025-04-09 RX ADMIN — Medication 40 MILLIGRAM(S): at 09:51

## 2025-04-09 RX ADMIN — Medication 4 MILLIGRAM(S): at 20:05

## 2025-04-09 RX ADMIN — Medication 400 MILLIGRAM(S): at 15:55

## 2025-04-09 RX ADMIN — Medication 400 MILLIGRAM(S): at 20:04

## 2025-04-09 RX ADMIN — POTASSIUM CHLORIDE, DEXTROSE MONOHYDRATE AND SODIUM CHLORIDE 100 MILLILITER(S): 150; 5; 900 INJECTION, SOLUTION INTRAVENOUS at 02:59

## 2025-04-09 NOTE — PROGRESS NOTE ADULT - SUBJECTIVE AND OBJECTIVE BOX
__________________________________________________________________   ===================>> SURGERY PROGRESS NOTE <<===================  -----------------------------------------------------------------------------------------------------------  Interval/Subjective:  Patient seen and examined. No acute events overnight. Vitals stable. Afebrile. Pain controlled with medications.   - she reports feeling extra tired this morning.   __________________________________________________________________   ===================>> VITAL SIGNS / EXAM <<=========================  -----------------------------------------------------------------------------------------------------------  T(C): 36.4 (05:12), Max: 37.7 (17:30)  HR: 88 (05:12) (78 - 91)  BP: 132/57 (05:12) (119/57 - 144/81)  RR: 18 (05:12) (17 - 18)  SpO2: 100% (05:12) (100% - 100%)    I & O's:  IN:    dextrose 5% + sodium chloride 0.45% w/ Additives: 2400 mL    IV PiggyBack: 1000 mL    Oral Fluid: 320 mL  Total IN: 3720 mL    OUT:    Nasogastric/Oral tube (mL): 100 mL    Voided (mL): 2400 mL  Total OUT: 2500 mL    Physical Exam:  General: NAD, resting comfortably in bed  HEENT: Normocephalic atraumatic  Respiratory: Nonlabored respirations  Cardio: regular rate, normotensive  Abdomen: soft, less distended, NT. Midline incision c/d/i, dressing changed.  __________________________________________________________________   ===================>> LAB AND IMAGING <<==========================  -----------------------------------------------------------------------------------------------------------  CBC: 25 @ 03:50          13.0   6.11 >----< 286          38.6    Chemistry: 25 @ 03:50  137|103|7    ------------< 106  3.8|22|0.58    Ca: 8.6 25 @ 03:50  M.00 25 @ 03:50  Phos: 3.8 25 @ 03:50    LFT's: 25 @ 03:50  AST: --  ALT: --  ALP: --  T.Bili: --  DSlavaBili: --  CBC: 25 @ 04:21          11.8   4.35 >----< 266          35.5    Chemistry: 25 @ 04:21  135|103|8    ------------< 110  3.7|21|0.56    Ca: 8.4 25 @ 04:21  M.90 25 @ 04:21  Phos: 2.1 25 @ 04:21    LFT's: 25 @ 04:21  AST: --  ALT: --  ALP: --  T.Bili: --  CAITLINBili: --    __________________________________________________________________   ===================>> ASSESSMENT AND PLAN <<======================  -----------------------------------------------------------------------------------------------------------  A:  64F w/ PMHx DCIS s/p bilateral mastectomy/reconstruction (2017), hyperparathyroidism s/p parathyroidectomy (2014), GERD; p/w abdominal pain, distention, 4 months constipation, 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 Post Op course c/b ileus requiring NGT, since removed. Advancing diet.   P:  - Pain control: APAP 1000mg IV q6h, HYD 0.2mg IV q4h PRN mod pain (4-6), HYD 0.5mg IV q4h PRN severe pain (7-10)  - Diet: Clear Liquid  - IVF: D5.45NS w/ 20mEq KCl @ 100mL/hr  - Schafer: In place  - Activity: Ambulate ad dane  - DVT ppx: ENOX 5000 units SC q8h, SCDs    A Team  [id number]

## 2025-04-10 LAB
ANION GAP SERPL CALC-SCNC: 13 MMOL/L — SIGNIFICANT CHANGE UP (ref 7–14)
BUN SERPL-MCNC: 8 MG/DL — SIGNIFICANT CHANGE UP (ref 7–23)
CALCIUM SERPL-MCNC: 9.1 MG/DL — SIGNIFICANT CHANGE UP (ref 8.4–10.5)
CHLORIDE SERPL-SCNC: 102 MMOL/L — SIGNIFICANT CHANGE UP (ref 98–107)
CO2 SERPL-SCNC: 22 MMOL/L — SIGNIFICANT CHANGE UP (ref 22–31)
CREAT SERPL-MCNC: 0.59 MG/DL — SIGNIFICANT CHANGE UP (ref 0.5–1.3)
EGFR: 101 ML/MIN/1.73M2 — SIGNIFICANT CHANGE UP
EGFR: 101 ML/MIN/1.73M2 — SIGNIFICANT CHANGE UP
GLUCOSE SERPL-MCNC: 110 MG/DL — HIGH (ref 70–99)
HCT VFR BLD CALC: 40.6 % — SIGNIFICANT CHANGE UP (ref 34.5–45)
HGB BLD-MCNC: 13.9 G/DL — SIGNIFICANT CHANGE UP (ref 11.5–15.5)
MAGNESIUM SERPL-MCNC: 1.9 MG/DL — SIGNIFICANT CHANGE UP (ref 1.6–2.6)
MCHC RBC-ENTMCNC: 28.7 PG — SIGNIFICANT CHANGE UP (ref 27–34)
MCHC RBC-ENTMCNC: 34.2 G/DL — SIGNIFICANT CHANGE UP (ref 32–36)
MCV RBC AUTO: 83.7 FL — SIGNIFICANT CHANGE UP (ref 80–100)
NRBC # BLD AUTO: 0 K/UL — SIGNIFICANT CHANGE UP (ref 0–0)
NRBC # FLD: 0 K/UL — SIGNIFICANT CHANGE UP (ref 0–0)
NRBC BLD AUTO-RTO: 0 /100 WBCS — SIGNIFICANT CHANGE UP (ref 0–0)
PHOSPHATE SERPL-MCNC: 3.8 MG/DL — SIGNIFICANT CHANGE UP (ref 2.5–4.5)
PLATELET # BLD AUTO: 304 K/UL — SIGNIFICANT CHANGE UP (ref 150–400)
POTASSIUM SERPL-MCNC: 3.8 MMOL/L — SIGNIFICANT CHANGE UP (ref 3.5–5.3)
POTASSIUM SERPL-SCNC: 3.8 MMOL/L — SIGNIFICANT CHANGE UP (ref 3.5–5.3)
RBC # BLD: 4.85 M/UL — SIGNIFICANT CHANGE UP (ref 3.8–5.2)
RBC # FLD: 12.7 % — SIGNIFICANT CHANGE UP (ref 10.3–14.5)
SODIUM SERPL-SCNC: 137 MMOL/L — SIGNIFICANT CHANGE UP (ref 135–145)
WBC # BLD: 9.85 K/UL — SIGNIFICANT CHANGE UP (ref 3.8–10.5)
WBC # FLD AUTO: 9.85 K/UL — SIGNIFICANT CHANGE UP (ref 3.8–10.5)

## 2025-04-10 PROCEDURE — 74018 RADEX ABDOMEN 1 VIEW: CPT | Mod: 26

## 2025-04-10 RX ORDER — MAGNESIUM SULFATE 500 MG/ML
1 SYRINGE (ML) INJECTION ONCE
Refills: 0 | Status: COMPLETED | OUTPATIENT
Start: 2025-04-10 | End: 2025-04-10

## 2025-04-10 RX ORDER — ACETAMINOPHEN 500 MG/5ML
1000 LIQUID (ML) ORAL EVERY 6 HOURS
Refills: 0 | Status: COMPLETED | OUTPATIENT
Start: 2025-04-10 | End: 2025-04-11

## 2025-04-10 RX ORDER — POTASSIUM CHLORIDE, DEXTROSE MONOHYDRATE AND SODIUM CHLORIDE 150; 5; 900 MG/100ML; G/100ML; MG/100ML
1000 INJECTION, SOLUTION INTRAVENOUS
Refills: 0 | Status: DISCONTINUED | OUTPATIENT
Start: 2025-04-10 | End: 2025-04-11

## 2025-04-10 RX ADMIN — POTASSIUM CHLORIDE, DEXTROSE MONOHYDRATE AND SODIUM CHLORIDE 50 MILLILITER(S): 150; 5; 900 INJECTION, SOLUTION INTRAVENOUS at 07:20

## 2025-04-10 RX ADMIN — Medication 1000 MILLIGRAM(S): at 13:20

## 2025-04-10 RX ADMIN — Medication 400 MILLIGRAM(S): at 18:31

## 2025-04-10 RX ADMIN — Medication 1000 MILLIGRAM(S): at 23:29

## 2025-04-10 RX ADMIN — CALCIUM CARBONATE 1 TABLET(S): 750 TABLET ORAL at 13:39

## 2025-04-10 RX ADMIN — CALCIUM CARBONATE 1 TABLET(S): 750 TABLET ORAL at 20:49

## 2025-04-10 RX ADMIN — HEPARIN SODIUM 5000 UNIT(S): 1000 INJECTION INTRAVENOUS; SUBCUTANEOUS at 08:19

## 2025-04-10 RX ADMIN — Medication 400 MILLIGRAM(S): at 22:29

## 2025-04-10 RX ADMIN — HEPARIN SODIUM 5000 UNIT(S): 1000 INJECTION INTRAVENOUS; SUBCUTANEOUS at 15:45

## 2025-04-10 RX ADMIN — Medication 400 MILLIGRAM(S): at 12:40

## 2025-04-10 RX ADMIN — Medication 40 MILLIGRAM(S): at 08:37

## 2025-04-10 RX ADMIN — Medication 1000 MILLIGRAM(S): at 18:53

## 2025-04-10 RX ADMIN — POTASSIUM CHLORIDE, DEXTROSE MONOHYDRATE AND SODIUM CHLORIDE 50 MILLILITER(S): 150; 5; 900 INJECTION, SOLUTION INTRAVENOUS at 20:49

## 2025-04-10 RX ADMIN — Medication 1000 MILLIGRAM(S): at 06:00

## 2025-04-10 RX ADMIN — Medication 100 GRAM(S): at 12:40

## 2025-04-10 RX ADMIN — POTASSIUM CHLORIDE, DEXTROSE MONOHYDRATE AND SODIUM CHLORIDE 100 MILLILITER(S): 150; 5; 900 INJECTION, SOLUTION INTRAVENOUS at 01:05

## 2025-04-10 RX ADMIN — Medication 20 MILLIEQUIVALENT(S): at 12:40

## 2025-04-10 RX ADMIN — HEPARIN SODIUM 5000 UNIT(S): 1000 INJECTION INTRAVENOUS; SUBCUTANEOUS at 01:05

## 2025-04-10 RX ADMIN — Medication 400 MILLIGRAM(S): at 05:00

## 2025-04-10 RX ADMIN — Medication 4 MILLIGRAM(S): at 09:27

## 2025-04-10 RX ADMIN — Medication 4 MILLIGRAM(S): at 15:44

## 2025-04-10 RX ADMIN — POTASSIUM CHLORIDE, DEXTROSE MONOHYDRATE AND SODIUM CHLORIDE 50 MILLILITER(S): 150; 5; 900 INJECTION, SOLUTION INTRAVENOUS at 08:38

## 2025-04-10 RX ADMIN — CALCIUM CARBONATE 1 TABLET(S): 750 TABLET ORAL at 08:37

## 2025-04-10 NOTE — DIETITIAN INITIAL EVALUATION ADULT - PERTINENT MEDS FT
MEDICATIONS  (STANDING):  acetaminophen   IVPB .. 1000 milliGRAM(s) IV Intermittent every 6 hours  acetaminophen   IVPB .. 1000 milliGRAM(s) IV Intermittent every 6 hours  dextrose 5% + sodium chloride 0.45% with potassium chloride 20 mEq/L 1000 milliLiter(s) (50 mL/Hr) IV Continuous <Continuous>  heparin   Injectable 5000 Unit(s) SubCutaneous every 8 hours  pantoprazole  Injectable 40 milliGRAM(s) IV Push every 24 hours    MEDICATIONS  (PRN):  calcium carbonate    500 mG (Tums) Chewable 1 Tablet(s) Chew three times a day PRN Heartburn  HYDROmorphone  Injectable 0.2 milliGRAM(s) IV Push every 4 hours PRN Moderate Pain (4 - 6)  HYDROmorphone  Injectable 0.5 milliGRAM(s) IV Push every 4 hours PRN Severe Pain (7 - 10)  ondansetron Injectable 4 milliGRAM(s) IV Push every 6 hours PRN Nausea and/or Vomiting

## 2025-04-10 NOTE — DIETITIAN INITIAL EVALUATION ADULT - NS FNS DIET ORDER
Diet, Low Fiber:   No Carbonated Beverages  Supplement Feeding Modality:  Oral  Ensure Max Cans or Servings Per Day:  1       Frequency:  Three Times a day (04-10-25 @ 08:11)

## 2025-04-10 NOTE — DIETITIAN INITIAL EVALUATION ADULT - PERTINENT LABORATORY DATA
04-10    137  |  102  |  8   ----------------------------<  110[H]  3.8   |  22  |  0.59    Ca    9.1      10 Apr 2025 05:43  Phos  3.8     04-10  Mg     1.90     04-10

## 2025-04-10 NOTE — DIETITIAN INITIAL EVALUATION ADULT - REASON FOR ADMISSION
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 Now s/p exploratory laparotomy, right hemicolectomy, ileotransverse colon anastomosis (4/4/25) for cecal volvulus. Post Op course c/b ileus requiring NGT, since removed. Diet advanced to Low fiber today from [facility].

## 2025-04-10 NOTE — DIETITIAN INITIAL EVALUATION ADULT - OTHER INFO
Met with patient and RN to obtain collateral. Patient started on Low fiber diet + Ensure Max per A team. Patient reports she had about 50% Ensure Max and did not have any solid (despite encouragement from RN). Patient c/o nausea at time of visit, given Zofran per RN. Patient denies any nausea/vomiting or difficulty chewing and swallowing. +Diarrhea this am. RD provided verbal and written instruction on low fiber diet. Answered all nutrition related questions inquired at time of interview. Importance of having small frequent po intake as tolerated of nutrient and protein dense foods encouraged. Reviewed High Biological Value Protein sources (i.e. chicken, turkey, beef, fish, eggs, etc.). Taking sips of PO supplement between meals encouraged.

## 2025-04-10 NOTE — PROGRESS NOTE ADULT - SUBJECTIVE AND OBJECTIVE BOX
Surgery Daily Progress Note  =====================================================    INTERVAL EVENTS: NAEO    SUBJECTIVE: Patient seen at bedside during AM rounds. Tolerating CLD but feeling weak. Had BM overnight, x3 in 24h. VSS and voiding appropriately. Abd remains soft, less distended than previous, pt feels is at her baseline. Minimally TTP. Dressing changed on rounds.     --------------------------------------------------------------------------------------  VITAL SIGNS:  T(C): 36.6 (04-10-25 @ 05:25), Max: 37.1 (04-09-25 @ 09:13)  HR: 88 (04-10-25 @ 05:25) (81 - 90)  BP: 135/59 (04-10-25 @ 05:25) (121/58 - 135/59)  RR: 18 (04-10-25 @ 05:25) (16 - 18)  SpO2: 99% (04-10-25 @ 05:25) (99% - 100%)  --------------------------------------------------------------------------------------  MEDICATIONS:   acetaminophen   IVPB .. 1000 milliGRAM(s) IV Intermittent every 6 hours  calcium carbonate    500 mG (Tums) Chewable 1 Tablet(s) Chew three times a day PRN  dextrose 5% + sodium chloride 0.45% with potassium chloride 20 mEq/L 1000 milliLiter(s) IV Continuous <Continuous>  heparin   Injectable 5000 Unit(s) SubCutaneous every 8 hours  HYDROmorphone  Injectable 0.2 milliGRAM(s) IV Push every 4 hours PRN  HYDROmorphone  Injectable 0.5 milliGRAM(s) IV Push every 4 hours PRN  ondansetron Injectable 4 milliGRAM(s) IV Push every 6 hours PRN  pantoprazole  Injectable 40 milliGRAM(s) IV Push every 24 hours    --------------------------------------------------------------------------------------  INTAKE/OUTPUT:     04-09-25 @ 07:01  -  04-10-25 @ 07:00  --------------------------------------------------------  IN: 1020 mL / OUT: 400 mL / NET: 620 mL      --------------------------------------------------------------------------------------  Physical Exam:  General: NAD, resting comfortably in bed  HEENT: Normocephalic atraumatic  Respiratory: Nonlabored respirations  Cardio: regular rate, normotensive  Abdomen: soft, less distended, NT. Midline incision c/d/i, dressing changed.  --------------------------------------------------------------------------------------

## 2025-04-11 LAB
ANION GAP SERPL CALC-SCNC: 13 MMOL/L — SIGNIFICANT CHANGE UP (ref 7–14)
BUN SERPL-MCNC: 12 MG/DL — SIGNIFICANT CHANGE UP (ref 7–23)
CALCIUM SERPL-MCNC: 8.7 MG/DL — SIGNIFICANT CHANGE UP (ref 8.4–10.5)
CHLORIDE SERPL-SCNC: 103 MMOL/L — SIGNIFICANT CHANGE UP (ref 98–107)
CO2 SERPL-SCNC: 19 MMOL/L — LOW (ref 22–31)
CREAT SERPL-MCNC: 0.68 MG/DL — SIGNIFICANT CHANGE UP (ref 0.5–1.3)
EGFR: 97 ML/MIN/1.73M2 — SIGNIFICANT CHANGE UP
EGFR: 97 ML/MIN/1.73M2 — SIGNIFICANT CHANGE UP
GLUCOSE SERPL-MCNC: 107 MG/DL — HIGH (ref 70–99)
HCT VFR BLD CALC: 36.8 % — SIGNIFICANT CHANGE UP (ref 34.5–45)
HGB BLD-MCNC: 12.8 G/DL — SIGNIFICANT CHANGE UP (ref 11.5–15.5)
MAGNESIUM SERPL-MCNC: 2 MG/DL — SIGNIFICANT CHANGE UP (ref 1.6–2.6)
MCHC RBC-ENTMCNC: 29.2 PG — SIGNIFICANT CHANGE UP (ref 27–34)
MCHC RBC-ENTMCNC: 34.8 G/DL — SIGNIFICANT CHANGE UP (ref 32–36)
MCV RBC AUTO: 84 FL — SIGNIFICANT CHANGE UP (ref 80–100)
NRBC # BLD AUTO: 0 K/UL — SIGNIFICANT CHANGE UP (ref 0–0)
NRBC # FLD: 0 K/UL — SIGNIFICANT CHANGE UP (ref 0–0)
NRBC BLD AUTO-RTO: 0 /100 WBCS — SIGNIFICANT CHANGE UP (ref 0–0)
PHOSPHATE SERPL-MCNC: 3.5 MG/DL — SIGNIFICANT CHANGE UP (ref 2.5–4.5)
PLATELET # BLD AUTO: 329 K/UL — SIGNIFICANT CHANGE UP (ref 150–400)
POTASSIUM SERPL-MCNC: 4.1 MMOL/L — SIGNIFICANT CHANGE UP (ref 3.5–5.3)
POTASSIUM SERPL-SCNC: 4.1 MMOL/L — SIGNIFICANT CHANGE UP (ref 3.5–5.3)
RBC # BLD: 4.38 M/UL — SIGNIFICANT CHANGE UP (ref 3.8–5.2)
RBC # FLD: 12.9 % — SIGNIFICANT CHANGE UP (ref 10.3–14.5)
SODIUM SERPL-SCNC: 135 MMOL/L — SIGNIFICANT CHANGE UP (ref 135–145)
WBC # BLD: 9.41 K/UL — SIGNIFICANT CHANGE UP (ref 3.8–10.5)
WBC # FLD AUTO: 9.41 K/UL — SIGNIFICANT CHANGE UP (ref 3.8–10.5)

## 2025-04-11 RX ORDER — SUCRALFATE 1 G
1 TABLET ORAL EVERY 6 HOURS
Refills: 0 | Status: DISCONTINUED | OUTPATIENT
Start: 2025-04-11 | End: 2025-04-13

## 2025-04-11 RX ORDER — ACETAMINOPHEN 500 MG/5ML
1000 LIQUID (ML) ORAL EVERY 6 HOURS
Refills: 0 | Status: DISCONTINUED | OUTPATIENT
Start: 2025-04-11 | End: 2025-04-13

## 2025-04-11 RX ADMIN — Medication 1 GRAM(S): at 20:42

## 2025-04-11 RX ADMIN — Medication 1 GRAM(S): at 09:13

## 2025-04-11 RX ADMIN — CALCIUM CARBONATE 1 TABLET(S): 750 TABLET ORAL at 20:41

## 2025-04-11 RX ADMIN — Medication 1000 MILLIGRAM(S): at 23:21

## 2025-04-11 RX ADMIN — Medication 400 MILLIGRAM(S): at 06:03

## 2025-04-11 RX ADMIN — HEPARIN SODIUM 5000 UNIT(S): 1000 INJECTION INTRAVENOUS; SUBCUTANEOUS at 16:34

## 2025-04-11 RX ADMIN — Medication 20 MILLIGRAM(S): at 15:47

## 2025-04-11 RX ADMIN — Medication 400 MILLIGRAM(S): at 17:48

## 2025-04-11 RX ADMIN — HEPARIN SODIUM 5000 UNIT(S): 1000 INJECTION INTRAVENOUS; SUBCUTANEOUS at 09:12

## 2025-04-11 RX ADMIN — POTASSIUM CHLORIDE, DEXTROSE MONOHYDRATE AND SODIUM CHLORIDE 50 MILLILITER(S): 150; 5; 900 INJECTION, SOLUTION INTRAVENOUS at 09:15

## 2025-04-11 RX ADMIN — CALCIUM CARBONATE 1 TABLET(S): 750 TABLET ORAL at 10:52

## 2025-04-11 RX ADMIN — Medication 40 MILLIGRAM(S): at 10:38

## 2025-04-11 RX ADMIN — POTASSIUM CHLORIDE, DEXTROSE MONOHYDRATE AND SODIUM CHLORIDE 50 MILLILITER(S): 150; 5; 900 INJECTION, SOLUTION INTRAVENOUS at 06:04

## 2025-04-11 RX ADMIN — Medication 400 MILLIGRAM(S): at 22:21

## 2025-04-11 RX ADMIN — HEPARIN SODIUM 5000 UNIT(S): 1000 INJECTION INTRAVENOUS; SUBCUTANEOUS at 00:51

## 2025-04-11 RX ADMIN — Medication 4 MILLIGRAM(S): at 20:41

## 2025-04-11 RX ADMIN — Medication 1 GRAM(S): at 16:34

## 2025-04-11 NOTE — PROGRESS NOTE ADULT - SUBJECTIVE AND OBJECTIVE BOX
A Team Colorectal Surgery Progress Note    S: Pt seen and examined with team on morning rounds. Patient feels slightly better than yesterday. Admits to some nausea. No emesis since episode yesterday during day. Complains of reflux (prior to admission and now) which causes the nausea. +Flatus, +BM x3 yesterday (loose). +Voiding and +Ambulating. No CP/Palpitations/SOB/HA/Dizziness.      Vital Signs Last 24 Hrs  T(C): 37.4 (11 Apr 2025 05:08), Max: 37.4 (11 Apr 2025 05:08)  T(F): 99.3 (11 Apr 2025 05:08), Max: 99.3 (11 Apr 2025 05:08)  HR: 95 (11 Apr 2025 05:08) (92 - 96)  BP: 122/54 (11 Apr 2025 05:08) (116/76 - 149/58)  BP(mean): --  RR: 18 (11 Apr 2025 05:08) (18 - 18)  SpO2: 97% (11 Apr 2025 05:08) (96% - 100%)    Parameters below as of 11 Apr 2025 05:08  Patient On (Oxygen Delivery Method): room air        I&O's Summary    10 Apr 2025 07:01  -  11 Apr 2025 07:00  --------------------------------------------------------  IN: 1910 mL / OUT: 650 mL / NET: 1260 mL      I&O's Detail    10 Apr 2025 07:01  -  11 Apr 2025 07:00  --------------------------------------------------------  IN:    dextrose 5% + sodium chloride 0.45% w/ Additives: 1050 mL    Oral Fluid: 860 mL  Total IN: 1910 mL    OUT:    Voided (mL): 650 mL  Total OUT: 650 mL    Total NET: 1260 mL      General Appearance: Appears well, NAD  Chest: Breathing comfortably on RA.    CV: S1, S2 @ 95  Abdomen: Softly distended, appropriate incisional tenderness, dressings clean/dry/intact.   Extremities: Moves all extremities.    LABS:                        12.8   9.41  )-----------( 329      ( 11 Apr 2025 05:39 )             36.8     04-11    135  |  103  |  12  ----------------------------<  107[H]  4.1   |  19[L]  |  0.68    Ca    8.7      11 Apr 2025 05:39  Phos  3.5     04-11  Mg     2.00     04-11        Urinalysis Basic - ( 11 Apr 2025 05:39 )    Color: x / Appearance: x / SG: x / pH: x  Gluc: 107 mg/dL / Ketone: x  / Bili: x / Urobili: x   Blood: x / Protein: x / Nitrite: x   Leuk Esterase: x / RBC: x / WBC x   Sq Epi: x / Non Sq Epi: x / Bacteria: x

## 2025-04-12 LAB
ANION GAP SERPL CALC-SCNC: 13 MMOL/L — SIGNIFICANT CHANGE UP (ref 7–14)
BUN SERPL-MCNC: 15 MG/DL — SIGNIFICANT CHANGE UP (ref 7–23)
CALCIUM SERPL-MCNC: 8.6 MG/DL — SIGNIFICANT CHANGE UP (ref 8.4–10.5)
CHLORIDE SERPL-SCNC: 100 MMOL/L — SIGNIFICANT CHANGE UP (ref 98–107)
CO2 SERPL-SCNC: 21 MMOL/L — LOW (ref 22–31)
CREAT SERPL-MCNC: 0.85 MG/DL — SIGNIFICANT CHANGE UP (ref 0.5–1.3)
EGFR: 76 ML/MIN/1.73M2 — SIGNIFICANT CHANGE UP
EGFR: 76 ML/MIN/1.73M2 — SIGNIFICANT CHANGE UP
GLUCOSE SERPL-MCNC: 118 MG/DL — HIGH (ref 70–99)
HCT VFR BLD CALC: 38.5 % — SIGNIFICANT CHANGE UP (ref 34.5–45)
HGB BLD-MCNC: 13.6 G/DL — SIGNIFICANT CHANGE UP (ref 11.5–15.5)
MAGNESIUM SERPL-MCNC: 1.7 MG/DL — SIGNIFICANT CHANGE UP (ref 1.6–2.6)
MCHC RBC-ENTMCNC: 29.2 PG — SIGNIFICANT CHANGE UP (ref 27–34)
MCHC RBC-ENTMCNC: 35.3 G/DL — SIGNIFICANT CHANGE UP (ref 32–36)
MCV RBC AUTO: 82.8 FL — SIGNIFICANT CHANGE UP (ref 80–100)
NRBC # BLD AUTO: 0 K/UL — SIGNIFICANT CHANGE UP (ref 0–0)
NRBC # FLD: 0 K/UL — SIGNIFICANT CHANGE UP (ref 0–0)
NRBC BLD AUTO-RTO: 0 /100 WBCS — SIGNIFICANT CHANGE UP (ref 0–0)
PHOSPHATE SERPL-MCNC: 3.1 MG/DL — SIGNIFICANT CHANGE UP (ref 2.5–4.5)
PLATELET # BLD AUTO: 367 K/UL — SIGNIFICANT CHANGE UP (ref 150–400)
POTASSIUM SERPL-MCNC: 3.6 MMOL/L — SIGNIFICANT CHANGE UP (ref 3.5–5.3)
POTASSIUM SERPL-SCNC: 3.6 MMOL/L — SIGNIFICANT CHANGE UP (ref 3.5–5.3)
RBC # BLD: 4.65 M/UL — SIGNIFICANT CHANGE UP (ref 3.8–5.2)
RBC # FLD: 12.8 % — SIGNIFICANT CHANGE UP (ref 10.3–14.5)
SODIUM SERPL-SCNC: 134 MMOL/L — LOW (ref 135–145)
WBC # BLD: 10.21 K/UL — SIGNIFICANT CHANGE UP (ref 3.8–10.5)
WBC # FLD AUTO: 10.21 K/UL — SIGNIFICANT CHANGE UP (ref 3.8–10.5)

## 2025-04-12 RX ORDER — HYDROCORTISONE 10 MG/G
1 CREAM TOPICAL DAILY
Refills: 0 | Status: DISCONTINUED | OUTPATIENT
Start: 2025-04-12 | End: 2025-04-13

## 2025-04-12 RX ORDER — SODIUM CHLORIDE 9 G/1000ML
1000 INJECTION, SOLUTION INTRAVENOUS
Refills: 0 | Status: DISCONTINUED | OUTPATIENT
Start: 2025-04-12 | End: 2025-04-13

## 2025-04-12 RX ORDER — HYDROCORTISONE 10 MG/G
1 CREAM TOPICAL DAILY
Refills: 0 | Status: DISCONTINUED | OUTPATIENT
Start: 2025-04-12 | End: 2025-04-12

## 2025-04-12 RX ADMIN — Medication 100 MILLIEQUIVALENT(S): at 22:51

## 2025-04-12 RX ADMIN — Medication 40 MILLIGRAM(S): at 09:07

## 2025-04-12 RX ADMIN — Medication 100 MILLIEQUIVALENT(S): at 20:49

## 2025-04-12 RX ADMIN — SODIUM CHLORIDE 30 MILLILITER(S): 9 INJECTION, SOLUTION INTRAVENOUS at 20:50

## 2025-04-12 RX ADMIN — Medication 1 GRAM(S): at 05:38

## 2025-04-12 RX ADMIN — Medication 1 GRAM(S): at 00:41

## 2025-04-12 RX ADMIN — CALCIUM CARBONATE 1 TABLET(S): 750 TABLET ORAL at 12:57

## 2025-04-12 RX ADMIN — Medication 1000 MILLIGRAM(S): at 11:50

## 2025-04-12 RX ADMIN — HEPARIN SODIUM 5000 UNIT(S): 1000 INJECTION INTRAVENOUS; SUBCUTANEOUS at 16:23

## 2025-04-12 RX ADMIN — HEPARIN SODIUM 5000 UNIT(S): 1000 INJECTION INTRAVENOUS; SUBCUTANEOUS at 08:33

## 2025-04-12 RX ADMIN — CALCIUM CARBONATE 1 TABLET(S): 750 TABLET ORAL at 17:56

## 2025-04-12 RX ADMIN — Medication 400 MILLIGRAM(S): at 16:20

## 2025-04-12 RX ADMIN — HEPARIN SODIUM 5000 UNIT(S): 1000 INJECTION INTRAVENOUS; SUBCUTANEOUS at 00:41

## 2025-04-12 RX ADMIN — Medication 20 MILLIGRAM(S): at 05:38

## 2025-04-12 RX ADMIN — Medication 1000 MILLIGRAM(S): at 16:40

## 2025-04-12 RX ADMIN — Medication 400 MILLIGRAM(S): at 11:35

## 2025-04-12 RX ADMIN — Medication 100 MILLIEQUIVALENT(S): at 16:20

## 2025-04-12 RX ADMIN — Medication 20 MILLIGRAM(S): at 16:23

## 2025-04-12 RX ADMIN — HEPARIN SODIUM 5000 UNIT(S): 1000 INJECTION INTRAVENOUS; SUBCUTANEOUS at 22:54

## 2025-04-12 RX ADMIN — SODIUM CHLORIDE 30 MILLILITER(S): 9 INJECTION, SOLUTION INTRAVENOUS at 17:09

## 2025-04-12 NOTE — PROGRESS NOTE ADULT - ATTENDING COMMENTS
+ flatus and liquid BM, lita diet, still c/o reflux symptoms    aaox3  abd soft, ND, incision c/d/i    LRD  OOB  D/C planning

## 2025-04-12 NOTE — PROGRESS NOTE ADULT - SUBJECTIVE AND OBJECTIVE BOX
__________________________________________________________________   ===================>> SURGERY PROGRESS NOTE <<===================  -----------------------------------------------------------------------------------------------------------  Interval/Subjective:  Patient seen and examined. No acute events overnight. Vitals stable. Afebrile. Pain controlled with medications.   __________________________________________________________________   ===================>> VITAL SIGNS / EXAM <<=========================  -----------------------------------------------------------------------------------------------------------  T(C): 36.7 (05:50), Max: 37.1 (09:48)  HR: 100 (05:50) (83 - 100)  BP: 137/65 (05:50) (122/73 - 139/58)  RR: 17 (05:50) (17 - 18)  SpO2: 99% (05:50) (99% - 100%)    I & O's:  IN:    dextrose 5% + sodium chloride 0.45% w/ Additives: 1050 mL    Oral Fluid: 860 mL  Total IN: 1910 mL    OUT:    Voided (mL): 650 mL  Total OUT: 650 mL    Physical Exam:  General: NAD, resting comfortably in bed  HEENT: Normocephalic atraumatic  Respiratory: Nonlabored respirations  Cardio: regular rate, normotensive  Abdomen: Softly distended, appropriate incisional tenderness, dressings clean/dry/intact.   __________________________________________________________________   ===================>> LAB AND IMAGING <<==========================  -----------------------------------------------------------------------------------------------------------  CBC: 25 @ 05:39          12.8   9.41 >----< 329          36.8    Chemistry: 25 @ 05:39  135|103|12    ------------< 107  4.1|19|0.68    Ca: 8.7 25 @ 05:39  M.00 25 @ 05:39  Phos: 3.5 25 @ 05:39    LFT's: 25 @ 05:39  AST: --  ALT: --  ALP: --  T.Bili: --  Ulises: --    __________________________________________________________________   ===================>> ASSESSMENT AND PLAN <<======================  -----------------------------------------------------------------------------------------------------------  A:  64F w/ PMHx DCIS s/p bilateral mastectomy/reconstruction (), hyperparathyroidism s/p parathyroidectomy (), GERD; p/w abdominal pain, distention, 4 months constipation, 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 Post Op course c/b ileus requiring NGT, since removed. Advancing diet. D/c planning pending diet tolerance.   P:  - Add Carafate and pepcid for reflux  - Diet: LRD with ensures  - IVF: D5 1/2NS w/ 20mEq KCl @ 50/hr  - Pain control: APAP and PO oxy  - Activity: Ambulate as tolerated  - DVT ppx: SQH 5000 units SC q8h    A Team  [id number]  __________________________________________________________________   ===================>> SURGERY PROGRESS NOTE <<===================  -----------------------------------------------------------------------------------------------------------  Interval/Subjective:  Patient seen and examined. Reports feeling weak. Tolerated breakfast and lunch yesterday, but did not have much for dinner. States that she is worried about being off IVF, because she doesn't feel fully ready to eat normally yet  __________________________________________________________________   ===================>> VITAL SIGNS / EXAM <<=========================  -----------------------------------------------------------------------------------------------------------  T(C): 36.7 (05:50), Max: 37.1 (09:48)  HR: 100 (05:50) (83 - 100)  BP: 137/65 (05:50) (122/73 - 139/58)  RR: 17 (05:50) (17 - 18)  SpO2: 99% (05:50) (99% - 100%)    I & O's:  IN:    dextrose 5% + sodium chloride 0.45% w/ Additives: 1050 mL    Oral Fluid: 860 mL  Total IN: 1910 mL    OUT:    Voided (mL): 650 mL  Total OUT: 650 mL    Physical Exam:  General: NAD, resting comfortably in bed  HEENT: Normocephalic atraumatic  Respiratory: Nonlabored respirations  Cardio: regular rate, normotensive  Abdomen: soft, not distended, appropriate incisional tenderness, dressings clean/dry/intact.   __________________________________________________________________   ===================>> LAB AND IMAGING <<==========================  -----------------------------------------------------------------------------------------------------------  CBC: 25 @ 05:39          12.8   9.41 >----< 329          36.8    Chemistry: 25 @ 05:39  135|103|12    ------------< 107  4.1|19|0.68    Ca: 8.7 25 @ 05:39  M.00 25 @ 05:39  Phos: 3.5 25 @ 05:39    LFT's: 25 @ 05:39  AST: --  ALT: --  ALP: --  Desiree: --  Ulises: --    __________________________________________________________________   ===================>> ASSESSMENT AND PLAN <<======================  -----------------------------------------------------------------------------------------------------------  A:  64F w/ PMHx DCIS s/p bilateral mastectomy/reconstruction (), hyperparathyroidism s/p parathyroidectomy (), GERD; p/w abdominal pain, distention, 4 months constipation, 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 Post Op course c/b ileus requiring NGT, since removed. Advancing diet. D/c planning pending diet tolerance.   P:  - Add Carafate and pepcid for reflux  - Diet: LRD with ensures  - IVF: D5 1/2NS w/ 20mEq KCl @ 50/hr  - Pain control: APAP and PO oxy  - Activity: Ambulate as tolerated  - DVT ppx: SQH 5000 units SC q8h    A Team  [id number]

## 2025-04-13 VITALS
HEART RATE: 89 BPM | DIASTOLIC BLOOD PRESSURE: 54 MMHG | RESPIRATION RATE: 17 BRPM | TEMPERATURE: 98 F | SYSTOLIC BLOOD PRESSURE: 125 MMHG | OXYGEN SATURATION: 98 %

## 2025-04-13 LAB
ANION GAP SERPL CALC-SCNC: 10 MMOL/L — SIGNIFICANT CHANGE UP (ref 7–14)
BUN SERPL-MCNC: 14 MG/DL — SIGNIFICANT CHANGE UP (ref 7–23)
CALCIUM SERPL-MCNC: 8.8 MG/DL — SIGNIFICANT CHANGE UP (ref 8.4–10.5)
CHLORIDE SERPL-SCNC: 101 MMOL/L — SIGNIFICANT CHANGE UP (ref 98–107)
CO2 SERPL-SCNC: 22 MMOL/L — SIGNIFICANT CHANGE UP (ref 22–31)
CREAT SERPL-MCNC: 0.76 MG/DL — SIGNIFICANT CHANGE UP (ref 0.5–1.3)
EGFR: 87 ML/MIN/1.73M2 — SIGNIFICANT CHANGE UP
EGFR: 87 ML/MIN/1.73M2 — SIGNIFICANT CHANGE UP
GLUCOSE SERPL-MCNC: 95 MG/DL — SIGNIFICANT CHANGE UP (ref 70–99)
HCT VFR BLD CALC: 35.8 % — SIGNIFICANT CHANGE UP (ref 34.5–45)
HGB BLD-MCNC: 12.7 G/DL — SIGNIFICANT CHANGE UP (ref 11.5–15.5)
MAGNESIUM SERPL-MCNC: 1.7 MG/DL — SIGNIFICANT CHANGE UP (ref 1.6–2.6)
MCHC RBC-ENTMCNC: 29.5 PG — SIGNIFICANT CHANGE UP (ref 27–34)
MCHC RBC-ENTMCNC: 35.5 G/DL — SIGNIFICANT CHANGE UP (ref 32–36)
MCV RBC AUTO: 83.3 FL — SIGNIFICANT CHANGE UP (ref 80–100)
NRBC # BLD AUTO: 0 K/UL — SIGNIFICANT CHANGE UP (ref 0–0)
NRBC # FLD: 0 K/UL — SIGNIFICANT CHANGE UP (ref 0–0)
NRBC BLD AUTO-RTO: 0 /100 WBCS — SIGNIFICANT CHANGE UP (ref 0–0)
PHOSPHATE SERPL-MCNC: 3.2 MG/DL — SIGNIFICANT CHANGE UP (ref 2.5–4.5)
PLATELET # BLD AUTO: 346 K/UL — SIGNIFICANT CHANGE UP (ref 150–400)
POTASSIUM SERPL-MCNC: 3.8 MMOL/L — SIGNIFICANT CHANGE UP (ref 3.5–5.3)
POTASSIUM SERPL-SCNC: 3.8 MMOL/L — SIGNIFICANT CHANGE UP (ref 3.5–5.3)
RBC # BLD: 4.3 M/UL — SIGNIFICANT CHANGE UP (ref 3.8–5.2)
RBC # FLD: 13 % — SIGNIFICANT CHANGE UP (ref 10.3–14.5)
SODIUM SERPL-SCNC: 133 MMOL/L — LOW (ref 135–145)
WBC # BLD: 9.42 K/UL — SIGNIFICANT CHANGE UP (ref 3.8–10.5)
WBC # FLD AUTO: 9.42 K/UL — SIGNIFICANT CHANGE UP (ref 3.8–10.5)

## 2025-04-13 RX ORDER — OXYCODONE HYDROCHLORIDE 30 MG/1
1 TABLET ORAL
Qty: 8 | Refills: 0
Start: 2025-04-13

## 2025-04-13 RX ORDER — SUCRALFATE 1 G
10 TABLET ORAL
Qty: 1200 | Refills: 0
Start: 2025-04-13 | End: 2025-05-12

## 2025-04-13 RX ORDER — ACETAMINOPHEN 500 MG/5ML
2 LIQUID (ML) ORAL
Qty: 0 | Refills: 0 | DISCHARGE
Start: 2025-04-13

## 2025-04-13 RX ORDER — ACETAMINOPHEN 500 MG/5ML
1000 LIQUID (ML) ORAL EVERY 6 HOURS
Refills: 0 | Status: DISCONTINUED | OUTPATIENT
Start: 2025-04-13 | End: 2025-04-13

## 2025-04-13 RX ADMIN — CALCIUM CARBONATE 1 TABLET(S): 750 TABLET ORAL at 12:39

## 2025-04-13 RX ADMIN — HEPARIN SODIUM 5000 UNIT(S): 1000 INJECTION INTRAVENOUS; SUBCUTANEOUS at 08:20

## 2025-04-13 RX ADMIN — CALCIUM CARBONATE 1 TABLET(S): 750 TABLET ORAL at 08:20

## 2025-04-13 RX ADMIN — Medication 1000 MILLIGRAM(S): at 09:20

## 2025-04-13 RX ADMIN — Medication 1000 MILLIGRAM(S): at 08:20

## 2025-04-13 NOTE — PROGRESS NOTE ADULT - PROVIDER SPECIALTY LIST ADULT
Colorectal Surgery
Surgery

## 2025-04-13 NOTE — DISCHARGE NOTE PROVIDER - NSDCMRMEDTOKEN_GEN_ALL_CORE_FT
acetaminophen 500 mg oral tablet: 2 tab(s) orally every 6 hours  Tums 500 mg oral tablet, chewable: 1 tab(s) chewed 3 times a day as needed for GERD   acetaminophen 500 mg oral tablet: 2 tab(s) orally every 6 hours  oxyCODONE 5 mg oral tablet: 1 tab(s) orally every 6 hours as needed for  severe pain MDD: 4  pantoprazole 40 mg oral delayed release tablet: 1 tab(s) orally once a day (before a meal) MDD: 1  sucralfate 1 g/10 mL oral suspension: 10 milliliter(s) orally every 6 hours as needed for reflux MDD: 40mL  Tums 500 mg oral tablet, chewable: 1 tab(s) chewed 3 times a day as needed for GERD

## 2025-04-13 NOTE — DISCHARGE NOTE PROVIDER - NSDCFUADDINST_GEN_ALL_CORE_FT
WOUND CARE:  Please keep incisions clean and dry. Please do not Scrub or rub incisions. Do not use lotion or powder on incisions.   BATHING: Please do not submerge wound underwater. You may shower and/or sponge bathe.  ACTIVITY: No heavy lifting or straining. Otherwise, you may return to your usual level of physical activity. If you are taking narcotic pain medication (such as Percocet) DO NOT drive a car, operate machinery or make important decisions.  DIET: Return to your usual diet.  NOTIFY YOUR SURGEON IF: You have any bleeding that does not stop, any pus draining from your wound(s), any fever (over 100.4 F) or chills, persistent nausea/vomiting, persistent diarrhea, or if your pain is not controlled on your discharge pain medications.  FOLLOW-UP: Please follow up with your primary care physician in one week regarding your hospitalization. Please follow-up with your surgeon, within 14 days following discharge- please call to schedule an appointment.

## 2025-04-13 NOTE — DISCHARGE NOTE PROVIDER - CARE PROVIDER_API CALL
Kev Carlson  Colon/Rectal Surgery  [address], [address]  [place] [address]  Phone: [phone]  Fax: [phone]  Established Patient  Follow Up Time: 2 weeks

## 2025-04-13 NOTE — DISCHARGE NOTE NURSING/CASE MANAGEMENT/SOCIAL WORK - PATIENT PORTAL LINK FT
You can access the FollowMyHealth Patient Portal offered by [facility] by registering at the following website: [email or web address]. By joining [employer]’s FollowMyHealth portal, you will also be able to view your health information using other applications (apps) compatible with our system.

## 2025-04-13 NOTE — DISCHARGE NOTE NURSING/CASE MANAGEMENT/SOCIAL WORK - NSDCPNINST_GEN_ALL_CORE
Pt provided with discharge paperwork. Pt educated on incisional care, home meds, and F/U appointments. Pt stable for discharge. IV removed.

## 2025-04-13 NOTE — DISCHARGE NOTE PROVIDER - HOSPITAL COURSE
MALISSA DONOHUE is a 64y Female who was admitted to [facility] for abdominal pain and distention for 1 day with imaging revealing cecal volvulus. Patient was admitted to colorectal surgery service and underwent a right hemicolectomy with ileotransverse side-to-side colonic anastomosis. Postoperatively, patient passed her trial of void with removal of the Schafer catheter, but course was complicated by ileus for which she required replacement of nasogastric tube on POD2. Patient then regained bowel function with appropriate decrease in NGT output and tolerance of clamp trials. Patient was then able to tolerate diet advancement, passing gas and bowel movements without nausea and vomiting.     At time of discharge, pt was tolerating a regular diet, voiding/stooling independently, ambulating, and pain was well-controlled. Patient and family felt ready for discharge. MALISSA DONOHUE is a 64y Female who was admitted to [facility] for abdominal pain and distention for 1 day with imaging revealing cecal volvulus. Patient was admitted to colorectal surgery service and underwent a right hemicolectomy with ileotransverse side-to-side colonic anastomosis. Postoperatively, patient passed her trial of void with removal of the Schafer catheter, but course was complicated by ileus for which she required replacement of nasogastric tube on POD2. Patient then regained bowel function with appropriate decrease in NGT output and tolerance of clamp trials. Patient was then able to tolerate diet advancement, passing gas and bowel movements without nausea and vomiting. Patient was also evaluated by physical therapy who determined patient had no skilled needs per PT.     At time of discharge, pt was tolerating a regular diet, voiding/stooling independently, ambulating, and pain was well-controlled. Patient and family felt ready for discharge.

## 2025-04-13 NOTE — DISCHARGE NOTE PROVIDER - NSDCFUSCHEDAPPT_GEN_ALL_CORE_FT
Nilay Talley  [facility]  SURGONC [address]  Scheduled Appointment: 04/29/2025    Cristina Diaz  [facility]  UROGYN [address]  Scheduled Appointment: 06/05/2025

## 2025-04-13 NOTE — PROGRESS NOTE ADULT - ASSESSMENT
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 Postoperatively developed N/V likely 2/2 ileus. NGT placed with 3L output in 24hr.    P:  - VTE PPx: SQH, SCDs  - Diet: NPO/NGT  - IVFs at 100  - Continue NGT, possible clamp trial today depending on output volume  - Pain: IV APAP, Dilaudid PRN  - No home meds   - ARBF, continuing to encourage OOB  - shower per routine    A team  [id number].
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 Post Op course c/b ileus requiring NGT, since removed. Advancing diet.   P:  - Add Carafate for reflux  - Diet: LRD with ensures  - IVF: D5 1/2NS w/ 20mEq KCl @ 50/hr  - Pain control: APAP 1000mg IV q6h, HYD 0.2mg IV q4h PRN mod pain (4-6), HYD 0.5mg IV q4h PRN severe pain (7-10)  - Activity: Ambulate as tolerated  - DVT ppx: SQH 5000 units SC q8h    A Team  [id number] 
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 She is recovering very well postoperatively.    P:  - Schafer out, f/u TOV  - VTE PPx: SQH, SCDs  - Diet: advance to CLD  - IVFs at 100    - Pain: IV APAP, Dilaudid PRN  - No home meds   - ARBF    A team  [id number].
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 Post Op course c/b ileus requiring NGT, since removed. Advancing diet. D/c planning pending diet tolerance.     P:  - anticipating dc home today   - Carafate, pepcid, tums for reflux  - Diet: LRD with ensures  - IVL  - Pain control: PO tylenol and PO oxy  - PT: no skilled needs   - Activity: Ambulate as tolerated  - DVT ppx: SQH 5000 units SC q8h    A Team  [id number]   
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 She is recovering well postoperatively.    P:  - VTE PPx: SQH, SCDs  - Diet: keeping CLD  - IVFs at 75  - Pain: IV APAP, Dilaudid PRN  - No home meds   - ARBF, continuing to encourage OOB  - shower per routine    A team  [id number].  
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 Post Op course c/b ileus requiring NGT, since removed. Advancing diet.   P:  - Pain control: APAP 1000mg IV q6h, HYD 0.2mg IV q4h PRN mod pain (4-6), HYD 0.5mg IV q4h PRN severe pain (7-10)  - Diet: adv LRD with ensures  - IVF: D5.45NS w/ 20mEq KCl @ 50/hr  - Activity: Ambulate ad dane  - DVT ppx: SQH 5000 units SC q8h,    A Team  [id number]

## 2025-04-13 NOTE — DISCHARGE NOTE NURSING/CASE MANAGEMENT/SOCIAL WORK - FINANCIAL ASSISTANCE
[facility] provides services at a reduced cost to those who are determined to be eligible through [facility]’s financial assistance program. Information regarding [facility]’s financial assistance program can be found by going to [email or web address] or by calling [phone].

## 2025-04-13 NOTE — PROGRESS NOTE ADULT - SUBJECTIVE AND OBJECTIVE BOX
Surgery Daily Progress Note  =====================================================    INTERVAL EVENTS: NAEO    SUBJECTIVE: Patient seen at bedside during AM rounds. Resting comfortably, pain appropriately controlled. Denies fever, chills, N/V. Tolerating LRD, +/+, urinating appropriately. Interested in going home. Questions re: activity postop addressed by team on AM rounds. Dressing changed on AM rounds, dressing has remained c/d/i.    --------------------------------------------------------------------------------------  VITAL SIGNS:  T(C): 36.9 (04-13-25 @ 05:06), Max: 37.1 (04-13-25 @ 00:54)  HR: 87 (04-13-25 @ 05:06) (79 - 98)  BP: 113/66 (04-13-25 @ 05:06) (110/62 - 128/63)  RR: 18 (04-13-25 @ 05:06) (17 - 18)  SpO2: 100% (04-13-25 @ 05:06) (99% - 100%)  --------------------------------------------------------------------------------------  MEDICATIONS:   acetaminophen     Tablet .. 1000 milliGRAM(s) Oral every 6 hours  calcium carbonate    500 mG (Tums) Chewable 1 Tablet(s) Chew three times a day PRN  famotidine    Tablet 20 milliGRAM(s) Oral two times a day  heparin   Injectable 5000 Unit(s) SubCutaneous every 8 hours  ondansetron Injectable 4 milliGRAM(s) IV Push every 6 hours PRN  pantoprazole    Tablet 40 milliGRAM(s) Oral before breakfast  sucralfate suspension 1 Gram(s) Oral every 6 hours    --------------------------------------------------------------------------------------  INTAKE/OUTPUT:     04-12-25 @ 07:01  -  04-13-25 @ 07:00  --------------------------------------------------------  IN: 1770 mL / OUT: 250 mL / NET: 1520 mL      --------------------------------------------------------------------------------------    Physical Exam:  General: NAD, resting comfortably in bed  HEENT: Normocephalic atraumatic  Respiratory: Nonlabored respirations  Cardio: regular rate, normotensive  Abdomen: soft, not distended, appropriate incisional tenderness, dressings clean/dry/intact.     --------------------------------------------------------------------------------------

## 2025-04-15 LAB — H PYLORI AG STL QL: NEGATIVE — SIGNIFICANT CHANGE UP

## 2025-04-21 ENCOUNTER — APPOINTMENT (OUTPATIENT)
Dept: COLORECTAL SURGERY | Facility: CLINIC | Age: 65
End: 2025-04-21
Payer: COMMERCIAL

## 2025-04-21 DIAGNOSIS — K56.2 VOLVULUS: ICD-10-CM

## 2025-04-21 PROCEDURE — 99024 POSTOP FOLLOW-UP VISIT: CPT

## 2025-05-07 ENCOUNTER — APPOINTMENT (OUTPATIENT)
Dept: COLORECTAL SURGERY | Facility: CLINIC | Age: 65
End: 2025-05-07
Payer: COMMERCIAL

## 2025-05-07 DIAGNOSIS — K56.2 VOLVULUS: ICD-10-CM

## 2025-05-07 PROCEDURE — 99024 POSTOP FOLLOW-UP VISIT: CPT

## 2025-05-20 ENCOUNTER — APPOINTMENT (OUTPATIENT)
Dept: SURGICAL ONCOLOGY | Facility: CLINIC | Age: 65
End: 2025-05-20

## 2025-05-20 VITALS
HEART RATE: 81 BPM | HEIGHT: 65 IN | RESPIRATION RATE: 17 BRPM | BODY MASS INDEX: 22.16 KG/M2 | OXYGEN SATURATION: 97 % | WEIGHT: 133 LBS | SYSTOLIC BLOOD PRESSURE: 139 MMHG | DIASTOLIC BLOOD PRESSURE: 74 MMHG

## 2025-05-20 DIAGNOSIS — D05.12 INTRADUCTAL CARCINOMA IN SITU OF LEFT BREAST: ICD-10-CM

## 2025-05-20 DIAGNOSIS — Z08 ENCOUNTER FOR FOLLOW-UP EXAMINATION AFTER COMPLETED TREATMENT FOR MALIGNANT NEOPLASM: ICD-10-CM

## 2025-05-20 DIAGNOSIS — Z85.3 ENCOUNTER FOR FOLLOW-UP EXAMINATION AFTER COMPLETED TREATMENT FOR MALIGNANT NEOPLASM: ICD-10-CM

## 2025-05-20 PROCEDURE — 99212 OFFICE O/P EST SF 10 MIN: CPT

## 2025-05-21 ENCOUNTER — APPOINTMENT (OUTPATIENT)
Dept: COLORECTAL SURGERY | Facility: CLINIC | Age: 65
End: 2025-05-21

## 2025-06-04 ENCOUNTER — APPOINTMENT (OUTPATIENT)
Dept: COLORECTAL SURGERY | Facility: CLINIC | Age: 65
End: 2025-06-04

## 2025-06-04 DIAGNOSIS — K56.2 VOLVULUS: ICD-10-CM

## 2025-06-04 PROCEDURE — 99024 POSTOP FOLLOW-UP VISIT: CPT

## 2025-08-21 ENCOUNTER — APPOINTMENT (OUTPATIENT)
Dept: UROGYNECOLOGY | Facility: CLINIC | Age: 65
End: 2025-08-21

## 2025-09-09 ENCOUNTER — APPOINTMENT (OUTPATIENT)
Dept: INTERNAL MEDICINE | Facility: CLINIC | Age: 65
End: 2025-09-09
Payer: MEDICARE

## 2025-09-09 DIAGNOSIS — K56.2 VOLVULUS: ICD-10-CM

## 2025-09-09 PROCEDURE — G2211 COMPLEX E/M VISIT ADD ON: CPT | Mod: 2W

## 2025-09-09 PROCEDURE — 99204 OFFICE O/P NEW MOD 45 MIN: CPT | Mod: 2W

## (undated) DEVICE — DRAPE WARMING SOLUTION 44 X 44"

## (undated) DEVICE — POSITIONER STRAP ARMBOARD VELCRO TS-30

## (undated) DEVICE — DRSG TEGADERM 2.5 X 3"

## (undated) DEVICE — ELCTR BOVIE TIP BLADE INSULATED 6.5" EDGE

## (undated) DEVICE — VENODYNE/SCD SLEEVE CALF MEDIUM

## (undated) DEVICE — DRAPE TOWEL BLUE 17" X 24"

## (undated) DEVICE — POSITIONER PINK PAD PIGAZZI SYSTEM

## (undated) DEVICE — Device

## (undated) DEVICE — ELCTR BOVIE PENCIL SMOKE EVACUATION

## (undated) DEVICE — PACK ABDOMINAL CLOSURE

## (undated) DEVICE — BASIN SET SINGLE

## (undated) DEVICE — SUT CHROMIC 3-0 27" SH

## (undated) DEVICE — DRSG STERISTRIPS 0.5 X 4"

## (undated) DEVICE — LIGASURE IMPACT

## (undated) DEVICE — SOL IRR POUR NS 0.9% 1500ML

## (undated) DEVICE — BLADE SURGICAL #10 STAINLESS

## (undated) DEVICE — SUT SILK 3-0 18" SH (POP-OFF)

## (undated) DEVICE — PACK MAJOR ABDOMINAL W ENDO DRAPE

## (undated) DEVICE — POSITIONER FOAM EGG CRATE ULNAR 2PCS (PINK)

## (undated) DEVICE — BLADE SURGICAL #15 CARBON

## (undated) DEVICE — GELPORT LAPAROSCOPIC SYSTEM

## (undated) DEVICE — POOLE SUCTION TIP

## (undated) DEVICE — ELCTR BOVIE TIP BLADE INSULATED 2.75" EDGE

## (undated) DEVICE — SUT VICRYL 1 36" CT-1 UNDYED

## (undated) DEVICE — SUCTION YANKAUER OPEN TIP NO VENT CURVE

## (undated) DEVICE — SUT VICRYL 2-0 27" SH UNDYED

## (undated) DEVICE — PROTECTOR HEEL / ELBOW FLUFFY

## (undated) DEVICE — LABELS BLANK W PEN

## (undated) DEVICE — DRAPE 3/4 SHEET 52X76"

## (undated) DEVICE — STAPLER SKIN MULTI DIRECTION W35

## (undated) DEVICE — STAPLER COVIDIEN ENDO GIA STANDARD HANDLE

## (undated) DEVICE — SHEARS HARMONIC 700 5MM X 36MM CURVED TIP

## (undated) DEVICE — WARMING BLANKET FULL ADULT

## (undated) DEVICE — SCOPE WARMER SEAL DISP

## (undated) DEVICE — SOL IRR POUR H2O 1500ML

## (undated) DEVICE — DRSG TELFA 3 X 8

## (undated) DEVICE — BIOSEL SIGMOIDOSCOPE KIT DISP

## (undated) DEVICE — ELCTR GROUNDING PAD ADULT COVIDIEN

## (undated) DEVICE — SUT PROLENE 1 30" CT-1